# Patient Record
Sex: FEMALE | Race: WHITE | URBAN - METROPOLITAN AREA
[De-identification: names, ages, dates, MRNs, and addresses within clinical notes are randomized per-mention and may not be internally consistent; named-entity substitution may affect disease eponyms.]

---

## 2017-06-12 ENCOUNTER — EMERGENCY (EMERGENCY)
Facility: HOSPITAL | Age: 22
LOS: 0 days | Discharge: HOME | End: 2017-06-13
Attending: EMERGENCY MEDICINE | Admitting: EMERGENCY MEDICINE
Payer: COMMERCIAL

## 2017-06-12 VITALS
RESPIRATION RATE: 19 BRPM | DIASTOLIC BLOOD PRESSURE: 80 MMHG | WEIGHT: 126.1 LBS | HEIGHT: 64 IN | OXYGEN SATURATION: 100 % | HEART RATE: 98 BPM | SYSTOLIC BLOOD PRESSURE: 124 MMHG | TEMPERATURE: 98 F

## 2017-06-12 DIAGNOSIS — R10.9 UNSPECIFIED ABDOMINAL PAIN: ICD-10-CM

## 2017-06-12 DIAGNOSIS — K58.9 IRRITABLE BOWEL SYNDROME WITHOUT DIARRHEA: ICD-10-CM

## 2017-06-12 PROCEDURE — 99285 EMERGENCY DEPT VISIT HI MDM: CPT

## 2017-06-13 VITALS
RESPIRATION RATE: 18 BRPM | TEMPERATURE: 98 F | SYSTOLIC BLOOD PRESSURE: 112 MMHG | HEART RATE: 77 BPM | OXYGEN SATURATION: 100 % | DIASTOLIC BLOOD PRESSURE: 72 MMHG

## 2017-06-13 LAB
ABO RH CONFIRMATION: SIGNIFICANT CHANGE UP
ALBUMIN SERPL ELPH-MCNC: 3.8 G/DL — SIGNIFICANT CHANGE UP (ref 3.3–5)
ALP SERPL-CCNC: 57 U/L — SIGNIFICANT CHANGE UP (ref 40–120)
ALT FLD-CCNC: 20 U/L — SIGNIFICANT CHANGE UP (ref 12–78)
ANION GAP SERPL CALC-SCNC: 6 MMOL/L — SIGNIFICANT CHANGE UP (ref 5–17)
APPEARANCE UR: CLEAR — SIGNIFICANT CHANGE UP
APTT BLD: 27.9 SEC — SIGNIFICANT CHANGE UP (ref 27.5–37.4)
AST SERPL-CCNC: 17 U/L — SIGNIFICANT CHANGE UP (ref 15–37)
BACTERIA # UR AUTO: (no result)
BASOPHILS # BLD AUTO: 0 K/UL — SIGNIFICANT CHANGE UP (ref 0–0.2)
BASOPHILS NFR BLD AUTO: 0.5 % — SIGNIFICANT CHANGE UP (ref 0–2)
BILIRUB SERPL-MCNC: 0.4 MG/DL — SIGNIFICANT CHANGE UP (ref 0.2–1.2)
BILIRUB UR-MCNC: NEGATIVE — SIGNIFICANT CHANGE UP
BLD GP AB SCN SERPL QL: SIGNIFICANT CHANGE UP
BUN SERPL-MCNC: 14 MG/DL — SIGNIFICANT CHANGE UP (ref 7–23)
CALCIUM SERPL-MCNC: 9.1 MG/DL — SIGNIFICANT CHANGE UP (ref 8.5–10.1)
CHLORIDE SERPL-SCNC: 108 MMOL/L — SIGNIFICANT CHANGE UP (ref 96–108)
CO2 SERPL-SCNC: 28 MMOL/L — SIGNIFICANT CHANGE UP (ref 22–31)
COLOR SPEC: YELLOW — SIGNIFICANT CHANGE UP
CREAT SERPL-MCNC: 0.73 MG/DL — SIGNIFICANT CHANGE UP (ref 0.5–1.3)
DIFF PNL FLD: NEGATIVE — SIGNIFICANT CHANGE UP
EOSINOPHIL # BLD AUTO: 0.5 K/UL — SIGNIFICANT CHANGE UP (ref 0–0.5)
EOSINOPHIL NFR BLD AUTO: 6.5 % — HIGH (ref 0–6)
EPI CELLS # UR: (no result)
GLUCOSE SERPL-MCNC: 93 MG/DL — SIGNIFICANT CHANGE UP (ref 70–99)
GLUCOSE UR QL: NEGATIVE MG/DL — SIGNIFICANT CHANGE UP
HCG SERPL-ACNC: <1 MIU/ML — SIGNIFICANT CHANGE UP
HCG UR QL: NEGATIVE — SIGNIFICANT CHANGE UP
HCT VFR BLD CALC: 38 % — SIGNIFICANT CHANGE UP (ref 34.5–45)
HGB BLD-MCNC: 13 G/DL — SIGNIFICANT CHANGE UP (ref 11.5–15.5)
INR BLD: 0.95 RATIO — SIGNIFICANT CHANGE UP (ref 0.88–1.16)
KETONES UR-MCNC: (no result)
LEUKOCYTE ESTERASE UR-ACNC: (no result)
LYMPHOCYTES # BLD AUTO: 2.7 K/UL — SIGNIFICANT CHANGE UP (ref 1–3.3)
LYMPHOCYTES # BLD AUTO: 35 % — SIGNIFICANT CHANGE UP (ref 13–44)
MCHC RBC-ENTMCNC: 30.2 PG — SIGNIFICANT CHANGE UP (ref 27–34)
MCHC RBC-ENTMCNC: 34.3 GM/DL — SIGNIFICANT CHANGE UP (ref 32–36)
MCV RBC AUTO: 88.2 FL — SIGNIFICANT CHANGE UP (ref 80–100)
MONOCYTES # BLD AUTO: 0.6 K/UL — SIGNIFICANT CHANGE UP (ref 0–0.9)
MONOCYTES NFR BLD AUTO: 7.9 % — SIGNIFICANT CHANGE UP (ref 2–14)
NEUTROPHILS # BLD AUTO: 3.9 K/UL — SIGNIFICANT CHANGE UP (ref 1.8–7.4)
NEUTROPHILS NFR BLD AUTO: 50.1 % — SIGNIFICANT CHANGE UP (ref 43–77)
NITRITE UR-MCNC: NEGATIVE — SIGNIFICANT CHANGE UP
PH UR: 6 — SIGNIFICANT CHANGE UP (ref 5–8)
PLATELET # BLD AUTO: 257 K/UL — SIGNIFICANT CHANGE UP (ref 150–400)
POTASSIUM SERPL-MCNC: 4 MMOL/L — SIGNIFICANT CHANGE UP (ref 3.5–5.3)
POTASSIUM SERPL-SCNC: 4 MMOL/L — SIGNIFICANT CHANGE UP (ref 3.5–5.3)
PROT SERPL-MCNC: 7.8 GM/DL — SIGNIFICANT CHANGE UP (ref 6–8.3)
PROT UR-MCNC: 30 MG/DL
PROTHROM AB SERPL-ACNC: 10.2 SEC — SIGNIFICANT CHANGE UP (ref 9.8–12.7)
RBC # BLD: 4.3 M/UL — SIGNIFICANT CHANGE UP (ref 3.8–5.2)
RBC # FLD: 11 % — SIGNIFICANT CHANGE UP (ref 10.3–14.5)
RBC CASTS # UR COMP ASSIST: (no result) /HPF (ref 0–4)
SODIUM SERPL-SCNC: 142 MMOL/L — SIGNIFICANT CHANGE UP (ref 135–145)
SP GR SPEC: 1.02 — SIGNIFICANT CHANGE UP (ref 1.01–1.02)
TYPE + AB SCN PNL BLD: SIGNIFICANT CHANGE UP
UROBILINOGEN FLD QL: NEGATIVE MG/DL — SIGNIFICANT CHANGE UP
WBC # BLD: 7.7 K/UL — SIGNIFICANT CHANGE UP (ref 3.8–10.5)
WBC # FLD AUTO: 7.7 K/UL — SIGNIFICANT CHANGE UP (ref 3.8–10.5)
WBC UR QL: (no result)

## 2017-06-13 PROCEDURE — 74177 CT ABD & PELVIS W/CONTRAST: CPT | Mod: 26

## 2017-06-13 RX ORDER — FAMOTIDINE 10 MG/ML
20 INJECTION INTRAVENOUS ONCE
Qty: 0 | Refills: 0 | Status: COMPLETED | OUTPATIENT
Start: 2017-06-13 | End: 2017-06-13

## 2017-06-13 RX ORDER — NITROFURANTOIN MACROCRYSTAL 50 MG
100 CAPSULE ORAL ONCE
Qty: 0 | Refills: 0 | Status: COMPLETED | OUTPATIENT
Start: 2017-06-13 | End: 2017-06-13

## 2017-06-13 RX ORDER — MORPHINE SULFATE 50 MG/1
4 CAPSULE, EXTENDED RELEASE ORAL ONCE
Qty: 0 | Refills: 0 | Status: DISCONTINUED | OUTPATIENT
Start: 2017-06-13 | End: 2017-06-13

## 2017-06-13 RX ORDER — NITROFURANTOIN MACROCRYSTAL 50 MG
1 CAPSULE ORAL
Qty: 14 | Refills: 0
Start: 2017-06-13 | End: 2017-06-20

## 2017-06-13 RX ORDER — SODIUM CHLORIDE 9 MG/ML
1000 INJECTION INTRAMUSCULAR; INTRAVENOUS; SUBCUTANEOUS ONCE
Qty: 0 | Refills: 0 | Status: COMPLETED | OUTPATIENT
Start: 2017-06-13 | End: 2017-06-13

## 2017-06-13 RX ORDER — ONDANSETRON 8 MG/1
4 TABLET, FILM COATED ORAL ONCE
Qty: 0 | Refills: 0 | Status: COMPLETED | OUTPATIENT
Start: 2017-06-13 | End: 2017-06-13

## 2017-06-13 RX ORDER — SIMETHICONE 80 MG/1
160 TABLET, CHEWABLE ORAL ONCE
Qty: 0 | Refills: 0 | Status: COMPLETED | OUTPATIENT
Start: 2017-06-13 | End: 2017-06-13

## 2017-06-13 RX ORDER — SODIUM CHLORIDE 9 MG/ML
3 INJECTION INTRAMUSCULAR; INTRAVENOUS; SUBCUTANEOUS ONCE
Qty: 0 | Refills: 0 | Status: COMPLETED | OUTPATIENT
Start: 2017-06-13 | End: 2017-06-13

## 2017-06-13 RX ADMIN — MORPHINE SULFATE 4 MILLIGRAM(S): 50 CAPSULE, EXTENDED RELEASE ORAL at 02:58

## 2017-06-13 RX ADMIN — MORPHINE SULFATE 4 MILLIGRAM(S): 50 CAPSULE, EXTENDED RELEASE ORAL at 01:25

## 2017-06-13 RX ADMIN — SODIUM CHLORIDE 3 MILLILITER(S): 9 INJECTION INTRAMUSCULAR; INTRAVENOUS; SUBCUTANEOUS at 00:32

## 2017-06-13 RX ADMIN — Medication 100 MILLIGRAM(S): at 08:02

## 2017-06-13 RX ADMIN — SIMETHICONE 160 MILLIGRAM(S): 80 TABLET, CHEWABLE ORAL at 08:02

## 2017-06-13 RX ADMIN — ONDANSETRON 4 MILLIGRAM(S): 8 TABLET, FILM COATED ORAL at 01:10

## 2017-06-13 RX ADMIN — SODIUM CHLORIDE 1000 MILLILITER(S): 9 INJECTION INTRAMUSCULAR; INTRAVENOUS; SUBCUTANEOUS at 00:31

## 2017-06-13 RX ADMIN — FAMOTIDINE 20 MILLIGRAM(S): 10 INJECTION INTRAVENOUS at 01:12

## 2017-06-13 RX ADMIN — Medication 20 MILLIGRAM(S): at 08:02

## 2017-06-13 NOTE — ED PROVIDER NOTE - OBJECTIVE STATEMENT
23 y/o female with PMhx of IBS presents to the ED c/o 3 days of worsening RLQ and suprapubic pain. Currently pt has no other complaints at this time and denies chest pain, SOB, n/v/d and fever.

## 2017-06-13 NOTE — ED PROVIDER NOTE - NS ED MD SCRIBE ATTENDING SCRIBE SECTIONS
RESULTS/HISTORY OF PRESENT ILLNESS/PAST MEDICAL/SURGICAL/SOCIAL HISTORY/PROGRESS NOTE/REVIEW OF SYSTEMS/DISPOSITION/PHYSICAL EXAM

## 2017-06-13 NOTE — ED ADULT NURSE REASSESSMENT NOTE - NS ED NURSE REASSESS COMMENT FT1
Pt A&O x4, received pt from ROLANDO Stringer resting comfortably in bed with rails up, pt states pain relief with medication. ambulatory to restroom with assist. Pending CT. safety maintained, will monitor.

## 2017-06-13 NOTE — ED PROVIDER NOTE - MEDICAL DECISION MAKING DETAILS
Pt with suprapubic and RLQ abdominal pain plan blood work, pain control and antiemetic. Will re-asses.

## 2018-11-03 ENCOUNTER — TRANSCRIPTION ENCOUNTER (OUTPATIENT)
Age: 23
End: 2018-11-03

## 2020-05-13 ENCOUNTER — EMERGENCY (EMERGENCY)
Facility: HOSPITAL | Age: 25
LOS: 0 days | Discharge: HOME | End: 2020-05-13
Attending: STUDENT IN AN ORGANIZED HEALTH CARE EDUCATION/TRAINING PROGRAM
Payer: COMMERCIAL

## 2020-05-13 VITALS
DIASTOLIC BLOOD PRESSURE: 118 MMHG | HEART RATE: 133 BPM | RESPIRATION RATE: 18 BRPM | OXYGEN SATURATION: 99 % | TEMPERATURE: 99 F | WEIGHT: 119.93 LBS | SYSTOLIC BLOOD PRESSURE: 171 MMHG

## 2020-05-13 VITALS
HEART RATE: 95 BPM | TEMPERATURE: 98 F | SYSTOLIC BLOOD PRESSURE: 124 MMHG | RESPIRATION RATE: 16 BRPM | OXYGEN SATURATION: 100 % | DIASTOLIC BLOOD PRESSURE: 84 MMHG

## 2020-05-13 DIAGNOSIS — F41.9 ANXIETY DISORDER, UNSPECIFIED: ICD-10-CM

## 2020-05-13 DIAGNOSIS — R56.9 UNSPECIFIED CONVULSIONS: ICD-10-CM

## 2020-05-13 DIAGNOSIS — F32.9 MAJOR DEPRESSIVE DISORDER, SINGLE EPISODE, UNSPECIFIED: ICD-10-CM

## 2020-05-13 DIAGNOSIS — R00.0 TACHYCARDIA, UNSPECIFIED: ICD-10-CM

## 2020-05-13 DIAGNOSIS — K58.9 IRRITABLE BOWEL SYNDROME WITHOUT DIARRHEA: ICD-10-CM

## 2020-05-13 LAB
ALBUMIN SERPL ELPH-MCNC: 3.8 G/DL — SIGNIFICANT CHANGE UP (ref 3.3–5)
ALP SERPL-CCNC: 70 U/L — SIGNIFICANT CHANGE UP (ref 40–120)
ALT FLD-CCNC: 24 U/L — SIGNIFICANT CHANGE UP (ref 12–78)
ANION GAP SERPL CALC-SCNC: 9 MMOL/L — SIGNIFICANT CHANGE UP (ref 5–17)
APAP SERPL-MCNC: < 2 UG/ML (ref 10–30)
APPEARANCE UR: CLEAR — SIGNIFICANT CHANGE UP
AST SERPL-CCNC: 12 U/L — LOW (ref 15–37)
BASOPHILS # BLD AUTO: 0.03 K/UL — SIGNIFICANT CHANGE UP (ref 0–0.2)
BASOPHILS NFR BLD AUTO: 0.4 % — SIGNIFICANT CHANGE UP (ref 0–2)
BILIRUB SERPL-MCNC: 0.6 MG/DL — SIGNIFICANT CHANGE UP (ref 0.2–1.2)
BILIRUB UR-MCNC: NEGATIVE — SIGNIFICANT CHANGE UP
BUN SERPL-MCNC: 12 MG/DL — SIGNIFICANT CHANGE UP (ref 7–23)
CALCIUM SERPL-MCNC: 9 MG/DL — SIGNIFICANT CHANGE UP (ref 8.5–10.1)
CHLORIDE SERPL-SCNC: 105 MMOL/L — SIGNIFICANT CHANGE UP (ref 96–108)
CO2 SERPL-SCNC: 26 MMOL/L — SIGNIFICANT CHANGE UP (ref 22–31)
COLOR SPEC: YELLOW — SIGNIFICANT CHANGE UP
CREAT SERPL-MCNC: 0.83 MG/DL — SIGNIFICANT CHANGE UP (ref 0.5–1.3)
DIFF PNL FLD: NEGATIVE — SIGNIFICANT CHANGE UP
EOSINOPHIL # BLD AUTO: 0.13 K/UL — SIGNIFICANT CHANGE UP (ref 0–0.5)
EOSINOPHIL NFR BLD AUTO: 1.6 % — SIGNIFICANT CHANGE UP (ref 0–6)
ETHANOL SERPL-MCNC: <10 MG/DL — SIGNIFICANT CHANGE UP (ref 0–10)
GLUCOSE SERPL-MCNC: 90 MG/DL — SIGNIFICANT CHANGE UP (ref 70–99)
GLUCOSE UR QL: NEGATIVE MG/DL — SIGNIFICANT CHANGE UP
HCT VFR BLD CALC: 39.7 % — SIGNIFICANT CHANGE UP (ref 34.5–45)
HGB BLD-MCNC: 13.2 G/DL — SIGNIFICANT CHANGE UP (ref 11.5–15.5)
IMM GRANULOCYTES NFR BLD AUTO: 0.2 % — SIGNIFICANT CHANGE UP (ref 0–1.5)
KETONES UR-MCNC: ABNORMAL
LEUKOCYTE ESTERASE UR-ACNC: ABNORMAL
LYMPHOCYTES # BLD AUTO: 3 K/UL — SIGNIFICANT CHANGE UP (ref 1–3.3)
LYMPHOCYTES # BLD AUTO: 37.3 % — SIGNIFICANT CHANGE UP (ref 13–44)
MCHC RBC-ENTMCNC: 30.1 PG — SIGNIFICANT CHANGE UP (ref 27–34)
MCHC RBC-ENTMCNC: 33.2 GM/DL — SIGNIFICANT CHANGE UP (ref 32–36)
MCV RBC AUTO: 90.6 FL — SIGNIFICANT CHANGE UP (ref 80–100)
MONOCYTES # BLD AUTO: 0.53 K/UL — SIGNIFICANT CHANGE UP (ref 0–0.9)
MONOCYTES NFR BLD AUTO: 6.6 % — SIGNIFICANT CHANGE UP (ref 2–14)
NEUTROPHILS # BLD AUTO: 4.33 K/UL — SIGNIFICANT CHANGE UP (ref 1.8–7.4)
NEUTROPHILS NFR BLD AUTO: 53.9 % — SIGNIFICANT CHANGE UP (ref 43–77)
NITRITE UR-MCNC: NEGATIVE — SIGNIFICANT CHANGE UP
PH UR: 5 — SIGNIFICANT CHANGE UP (ref 5–8)
PLATELET # BLD AUTO: 295 K/UL — SIGNIFICANT CHANGE UP (ref 150–400)
POTASSIUM SERPL-MCNC: 3.6 MMOL/L — SIGNIFICANT CHANGE UP (ref 3.5–5.3)
POTASSIUM SERPL-SCNC: 3.6 MMOL/L — SIGNIFICANT CHANGE UP (ref 3.5–5.3)
PROT SERPL-MCNC: 8.1 GM/DL — SIGNIFICANT CHANGE UP (ref 6–8.3)
PROT UR-MCNC: 30 MG/DL
RBC # BLD: 4.38 M/UL — SIGNIFICANT CHANGE UP (ref 3.8–5.2)
RBC # FLD: 12 % — SIGNIFICANT CHANGE UP (ref 10.3–14.5)
SALICYLATES SERPL-MCNC: <1.7 MG/DL — LOW (ref 2.8–20)
SODIUM SERPL-SCNC: 140 MMOL/L — SIGNIFICANT CHANGE UP (ref 135–145)
SP GR SPEC: 1.02 — SIGNIFICANT CHANGE UP (ref 1.01–1.02)
UROBILINOGEN FLD QL: NEGATIVE MG/DL — SIGNIFICANT CHANGE UP
WBC # BLD: 8.04 K/UL — SIGNIFICANT CHANGE UP (ref 3.8–10.5)
WBC # FLD AUTO: 8.04 K/UL — SIGNIFICANT CHANGE UP (ref 3.8–10.5)

## 2020-05-13 PROCEDURE — 80053 COMPREHEN METABOLIC PANEL: CPT

## 2020-05-13 PROCEDURE — 99284 EMERGENCY DEPT VISIT MOD MDM: CPT | Mod: 25

## 2020-05-13 PROCEDURE — 84702 CHORIONIC GONADOTROPIN TEST: CPT

## 2020-05-13 PROCEDURE — 93005 ELECTROCARDIOGRAM TRACING: CPT

## 2020-05-13 PROCEDURE — 81025 URINE PREGNANCY TEST: CPT

## 2020-05-13 PROCEDURE — 82962 GLUCOSE BLOOD TEST: CPT

## 2020-05-13 PROCEDURE — 84443 ASSAY THYROID STIM HORMONE: CPT

## 2020-05-13 PROCEDURE — 93010 ELECTROCARDIOGRAM REPORT: CPT

## 2020-05-13 PROCEDURE — 85025 COMPLETE CBC W/AUTO DIFF WBC: CPT

## 2020-05-13 PROCEDURE — 80307 DRUG TEST PRSMV CHEM ANLYZR: CPT

## 2020-05-13 PROCEDURE — 81001 URINALYSIS AUTO W/SCOPE: CPT

## 2020-05-13 PROCEDURE — 70450 CT HEAD/BRAIN W/O DYE: CPT

## 2020-05-13 PROCEDURE — 87086 URINE CULTURE/COLONY COUNT: CPT

## 2020-05-13 PROCEDURE — 96360 HYDRATION IV INFUSION INIT: CPT

## 2020-05-13 PROCEDURE — 70450 CT HEAD/BRAIN W/O DYE: CPT | Mod: 26

## 2020-05-13 PROCEDURE — 36415 COLL VENOUS BLD VENIPUNCTURE: CPT

## 2020-05-13 PROCEDURE — 99284 EMERGENCY DEPT VISIT MOD MDM: CPT

## 2020-05-13 RX ORDER — SODIUM CHLORIDE 9 MG/ML
1000 INJECTION INTRAMUSCULAR; INTRAVENOUS; SUBCUTANEOUS ONCE
Refills: 0 | Status: COMPLETED | OUTPATIENT
Start: 2020-05-13 | End: 2020-05-13

## 2020-05-13 RX ADMIN — SODIUM CHLORIDE 1000 MILLILITER(S): 9 INJECTION INTRAMUSCULAR; INTRAVENOUS; SUBCUTANEOUS at 10:30

## 2020-05-13 RX ADMIN — SODIUM CHLORIDE 1000 MILLILITER(S): 9 INJECTION INTRAMUSCULAR; INTRAVENOUS; SUBCUTANEOUS at 09:20

## 2020-05-13 NOTE — ED PROVIDER NOTE - OBJECTIVE STATEMENT
26 y/o female with a PMHx of anxiety, depression, IBS presents to the ED BIBEMS from home for evaluation. Pt's  boyfriend woke up this morning and noticed pt shaking. Upon EMS arrival, pt was confused, not shaking and not really talking. Upon ED arrival, pt talking and was able to recall event. Pt reports 5 to 10 years ago she had similar symptoms and was diagnosed with a TIA. Denies tongue biting, bladder/bowel incontinence, head trauma. On Zoloft, Lamictal and Gabapentin. No other complaints at this time.

## 2020-05-13 NOTE — ED PROVIDER NOTE - NSFOLLOWUPINSTRUCTIONS_ED_ALL_ED_FT
Seizure, Adult  When you have a seizure:    Parts of your body may move.  How aware or awake (conscious) you are may change.  You may shake (convulse).    Some people have symptoms right before a seizure happens. These symptoms may include:    Fear.  Worry (anxiety).  Feeling like you are going to throw up (nausea).  Feeling like the room is spinning (vertigo).  Feeling like you saw or heard something before (hola vu).  Odd tastes or smells.  Changes in vision, such as seeing flashing lights or spots.    ImageSeizures usually last from 30 seconds to 2 minutes. Usually, they are not harmful unless they last a long time.    Follow these instructions at home:  Medicines     Take over-the-counter and prescription medicines only as told by your doctor.  Avoid anything that may keep your medicine from working, such as alcohol.  Activity     Do not do any activities that would be dangerous if you had another seizure, like driving or swimming. Wait until your doctor approves.  If you live in the [place] ask your local DMV (department of [place]) when you can drive.  Rest.  Teaching others     Teach friends and family what to do when you have a seizure. They should:    Lay you on the ground.  Protect your head and body.  Loosen any tight clothing around your neck.  Turn you on your side.  Stay with you until you are better.  Not hold you down.  Not put anything in your mouth.  Know whether or not you need emergency care.    General instructions     Contact your doctor each time you have a seizure.  Avoid anything that gives you seizures.  Keep a seizure diary. Write down:    What you think caused each seizure.  What you remember about each seizure.    Keep all follow-up visits as told by your doctor. This is important.  Contact a doctor if:  You have another seizure.  You have seizures more often.  There is any change in what happens during your seizures.  You continue to have seizures with treatment.  You have symptoms of being sick or having an infection.  Get help right away if:  You have a seizure:    That lasts longer than 5 minutes.  That is different than seizures you had before.  That makes it harder to breathe.  After you hurt your head.    After a seizure, you cannot speak or use a part of your body.  After a seizure, you are confused or have a bad headache.  You have two or more seizures in a row.  You are having seizures more often.  You do not wake up right after a seizure.  You get hurt during a seizure.  In an emergency:     These symptoms may be an emergency. Do not wait to see if the symptoms will go away. Get medical help right away. Call your local emergency services (911 in the [place] Do not drive yourself to the hospital.   This information is not intended to replace advice given to you by your health care provider. Make sure you discuss any questions you have with your health care provider.

## 2020-05-13 NOTE — ED PROVIDER NOTE - CARE PROVIDER_API CALL
Claudia Barnes  NEUROLOGY  [address] [place] [address], [address] [place] [address]  Phone: [phone]  Fax: [phone]  Follow Up Time:

## 2020-05-13 NOTE — ED PROVIDER NOTE - PROGRESS NOTE DETAILS
Charisma Day for attending Dr. Parra: Repeat HR 92, /93. Pt feeling better. Instructed to follow up with outpatient neurology in 1-2 days. Charisma Day for attending Dr. Parra: Repeat HR 92, /93. Pt feeling better. Instructed to follow up with outpatient neurology in 1-2 days. No driving until seen/cleared by neurology

## 2020-05-13 NOTE — ED ADULT TRIAGE NOTE - ACCOMPANIED BY
CLINICAL DATA:



Check for accessory placental lobe



TECHNICAL DATA:





Ultrasound, pelvic (nonobstetric), real-time with image documentation; transabdominal and transvagina
l imaging with Doppler was performed.



Doppler imaging demonstrates normal arterial and venous flow.



FINDINGS:



Within the uterus there is a broad area of heterogeneous echogenicity measuring 9.5 cm in length 3.2 
cm in AP dimensions and 4.2 cm in width. This fails to demonstrate significant vascularity.



There is no significant quantity of free fluid dependently within the pelvis.



IMPRESSION:



Abnormal appearance of the uterus with a ill-defined soft tissue area within the endometrial canal, d
ifferential diagnosis would include retained products of conception. In most cases of retained breast
 conception there is increased vascularity but the fact that there is not increased vascularity in th
is case does not completely exclude the diagnosis. Recommend clinical correlation. Repeat ultrasound 
may be of benefit for further evaluation



WOMEN OF REPRODUCTIVE AGE:



1. Cysts 3 cm: Normal physiologic findings; at the discretion of the interpreting physician whether o
r not to describe them in the imaging report; do not need follow-up.



2. Cysts >3 and 5 cm: Should be described in the imaging report with a statement that they are almost
 certainly benign; do not need follow-up.



3. Cysts >5 and 7 cm: Should be described in the imaging report with a statement that they are almost
 certainly benign; yearly follow-up with US recommended.



4. Cysts >7 cm: Since these may be difficult to assess completely with US, further imaging with magne
tic resonance (MR) or surgical evaluation should be considered.





 POSTMENOPAUSAL WOMEN:



1. Cysts 1 cm: Are clinically inconsequential; at the discretion of the interpreting physician whethe
r or not to describe them in the imaging report; do not need follow-up.



2. Cysts >1 and 7 cm: Should be described in the imaging report with statement that they are almost c
ertainly benign; yearly follow-up, at least initially, with US recommended. Some practices may opt to
 increase the lower size threshold for follow-up from 1 cm to as high as 3 cm. One may opt to continu
e follow-up annually or to decrease the frequency of follow-up once stability or decrease in size has
 been confirmed. Cysts in the larger end of this range should still generally be followed on a regula
r basis.



3. Cysts >7 cm: Since these may be difficult to assess completely with US, further imaging with MR or
 surgical evaluation should be considered..



Signer Name: Jamaal Garcia MD 

Signed: 8/17/2019 9:00 PM

 Workstation Name: [id number]
CLINICAL DATA:



Repeat examination for possible retained products of conception



TECHNICAL DATA:





Ultrasound, pelvic (nonobstetric), real-time with image documentation; transabdominal and transvagina
l imaging with Doppler was performed.



Doppler imaging demonstrates normal arterial and venous flow.



FINDINGS:



Within the uterus there is a broad area of heterogeneous echogenicity not dissimilar from the exam 24
 hours earlier. However there does appear to be increased vascularity.



There is no significant quantity of free fluid dependently within the pelvis.



IMPRESSION:



Abnormal appearance of the uterus with a ill-defined soft tissue area within the endometrial canal, d
ifferential diagnosis would include retained products of conception. Endometrial blood clot could giv
e a similar appearance. Recommend clinical correlation



WOMEN OF REPRODUCTIVE AGE:



1. Cysts 3 cm: Normal physiologic findings; at the discretion of the interpreting physician whether o
r not to describe them in the imaging report; do not need follow-up.



2. Cysts >3 and 5 cm: Should be described in the imaging report with a statement that they are almost
 certainly benign; do not need follow-up.



3. Cysts >5 and 7 cm: Should be described in the imaging report with a statement that they are almost
 certainly benign; yearly follow-up with US recommended.



4. Cysts >7 cm: Since these may be difficult to assess completely with US, further imaging with magne
tic resonance (MR) or surgical evaluation should be considered.





 POSTMENOPAUSAL WOMEN:



1. Cysts 1 cm: Are clinically inconsequential; at the discretion of the interpreting physician whethe
r or not to describe them in the imaging report; do not need follow-up.



2. Cysts >1 and 7 cm: Should be described in the imaging report with statement that they are almost c
ertainly benign; yearly follow-up, at least initially, with US recommended. Some practices may opt to
 increase the lower size threshold for follow-up from 1 cm to as high as 3 cm. One may opt to continu
e follow-up annually or to decrease the frequency of follow-up once stability or decrease in size has
 been confirmed. Cysts in the larger end of this range should still generally be followed on a regula
r basis.



3. Cysts >7 cm: Since these may be difficult to assess completely with US, further imaging with MR or
 surgical evaluation should be considered



Signer Name: Jamaal Garcia MD 

Signed: 8/18/2019 6:49 PM

 Workstation Name: [id number]
EMT/paramedic

## 2020-05-13 NOTE — ED PROVIDER NOTE - PATIENT PORTAL LINK FT
You can access the FollowMyHealth Patient Portal offered by [facility] by registering at the following website: [email or web address]. By joining [employer]’s FollowMyHealth portal, you will also be able to view your health information using other applications (apps) compatible with our system.

## 2020-05-13 NOTE — ED ADULT TRIAGE NOTE - CHIEF COMPLAINT QUOTE
Pt. to the ED BIBA from Home. As per EMS, boyfriend called ambulance because patient woke up shaking, pale and possible having a seizure at 8am while laying in bed- Pt. awake and alert at this time. Pt. appears anxious- Hx. of TIA and Bipolar- Denies SI/HI- Last seen normal 2am - LAMS 0- Code Stroke not indicated as per Dr. Parra

## 2020-05-14 PROBLEM — F41.9 ANXIETY DISORDER, UNSPECIFIED: Chronic | Status: ACTIVE | Noted: 2020-05-13

## 2020-05-14 PROBLEM — F32.9 MAJOR DEPRESSIVE DISORDER, SINGLE EPISODE, UNSPECIFIED: Chronic | Status: ACTIVE | Noted: 2020-05-13

## 2020-05-14 LAB
CULTURE RESULTS: SIGNIFICANT CHANGE UP
SPECIMEN SOURCE: SIGNIFICANT CHANGE UP

## 2020-05-19 ENCOUNTER — APPOINTMENT (OUTPATIENT)
Dept: NEUROLOGY | Facility: CLINIC | Age: 25
End: 2020-05-19
Payer: COMMERCIAL

## 2020-05-19 ENCOUNTER — TRANSCRIPTION ENCOUNTER (OUTPATIENT)
Age: 25
End: 2020-05-19

## 2020-05-19 DIAGNOSIS — R55 SYNCOPE AND COLLAPSE: ICD-10-CM

## 2020-05-19 DIAGNOSIS — F32.9 ANXIETY DISORDER, UNSPECIFIED: ICD-10-CM

## 2020-05-19 DIAGNOSIS — F41.9 ANXIETY DISORDER, UNSPECIFIED: ICD-10-CM

## 2020-05-19 DIAGNOSIS — Z87.19 PERSONAL HISTORY OF OTHER DISEASES OF THE DIGESTIVE SYSTEM: ICD-10-CM

## 2020-05-19 DIAGNOSIS — Z78.9 OTHER SPECIFIED HEALTH STATUS: ICD-10-CM

## 2020-05-19 PROCEDURE — 99205 OFFICE O/P NEW HI 60 MIN: CPT | Mod: 95

## 2020-05-19 RX ORDER — NORETHINDRONE ACETATE AND ETHINYL ESTRADIOL AND FERROUS FUMARATE 1MG-20(24)
1-20 KIT ORAL
Qty: 84 | Refills: 0 | Status: ACTIVE | COMMUNITY
Start: 2020-04-24

## 2020-05-19 RX ORDER — SERTRALINE HYDROCHLORIDE 50 MG/1
50 TABLET, FILM COATED ORAL
Qty: 30 | Refills: 0 | Status: ACTIVE | COMMUNITY
Start: 2020-05-08

## 2020-05-19 RX ORDER — LAMOTRIGINE 200 MG/1
200 TABLET ORAL
Qty: 30 | Refills: 0 | Status: ACTIVE | COMMUNITY
Start: 2019-12-27

## 2020-05-19 RX ORDER — CLONAZEPAM 0.5 MG/1
0.5 TABLET ORAL
Qty: 30 | Refills: 0 | Status: ACTIVE | COMMUNITY
Start: 2020-05-12

## 2020-05-19 RX ORDER — GABAPENTIN 300 MG/1
300 CAPSULE ORAL
Qty: 30 | Refills: 0 | Status: ACTIVE | COMMUNITY
Start: 2019-12-04

## 2020-05-19 NOTE — DISCUSSION/SUMMARY
[FreeTextEntry1] : 25 year old RH female with PMH of Anxiety/depression, possible syncope / ? seizure (pain-induced) \par 7 years ago, experienced a possible complex partial seizure on 5/13/20, witnessed by marcus, he reports, pts right leg was involuntary moving/-like kicking, she seemed to be dazed like having a bad dream, not responding appropriately, soon after, had flinging movements of right arm, this entire episode lasted about 2 minutes, her mental sensorium cleared fully in ED, no urinary incontinence.\par \par # New onset possible complex partial seizure; history of possible seizure versus syncope 7 years ago\par \par - I have recommended increasing lamotrigine by 25 mg per week x 2 weeks, to maximum of 250 mg daily.\par - Obtain MRI of the brain\par - 24 hr EEG monitoring, after that further recommendations\par - I have advised the patient not to drive, per [place] law, not to drive for 1 year, unless cleared by a neurologist\par - Seizure precautions

## 2020-05-19 NOTE — HISTORY OF PRESENT ILLNESS
[Home] : at home, [unfilled] , at the time of the visit. [Medical Office: ([facility])___] : at the medical office located in  [Patient] : the patient [FreeTextEntry1] : Verbal consent obtained for Telehealth visit obtained, during the visit patient's fiancé is by her side.\par \par 25 year old RH female with PMH of Anxiety/depression, reports that on 5/13/20 she had a possible seizure, she was seen in [facility] ED, she does not recall the details as she was in and out of consciousness, she recalls being fully aware/awake in ED.\par As per patient's fiancé, he reports, at around 8 PM on that morning, as he rolled over in bed, he noted patient right leg was involuntary moving/-like kicking, he tried to wake her up, she seemed to be dazed like having a bad dream, she was not responding appropriately, soon after, he noted she had flinging movements of right arm, this entire episode lasted about 2 minutes, she continued to be disoriented, EMS was called, upon arrival in ED, patient's mental sensorium cleared, she did not have any urinary incontinence, but reports no headache and heaviness. In ED, CT scan of the head that was unremarkable, routine labs, EKG were done as well and she was discharged home with advice to follow up with neurology.\par \par Upon obtaining further history, patient recalls about 7 years ago she had severe abdominal/stomach pains, she apparently collapsed at home and was in and out of consciousness, she does not recall much detail to this event, she followed up with a neurologist Dr. Spangler.\par \par Patient has been following up with a psychiatrist for years, she is on lamotrigine 200 mgs, gabapentin 300 mg daily and clonazepam; 3 weeks ago Zoloft 50 mg was added as patient was under lot of stress, her stepfather had committed suicide.\par \par Patient's fiancé reports that patient continues to have occasional twitching movements of right arm and leg, has been present since past 3 weeks. In addition, he reports that she has been sleep talking and on one occasion she pretended to be watching TV, when she was not aware of it.

## 2020-05-19 NOTE — REASON FOR VISIT
[Post ER] : a post ER visit [Other: _____] : [unfilled] [FreeTextEntry1] : Referred by ED physician for evaluation of possible seizure

## 2020-05-19 NOTE — REVIEW OF SYSTEMS
[As Noted in HPI] : as noted in HPI [Seizures] : convulsions [Anxiety] : anxiety [Depression] : depression [Sleep Disturbances] : sleep disturbances [Negative] : Heme/Lymph [de-identified] : jittery / twitching sensation

## 2020-05-19 NOTE — PHYSICAL EXAM
[General Appearance - Alert] : alert [General Appearance - Well-Appearing] : healthy appearing [General Appearance - In No Acute Distress] : in no acute distress [Impaired Insight] : insight and judgment were intact [Mood] : the mood was normal [Person] : oriented to person [Place] : oriented to place [Registration Intact] : recent registration memory intact [Time] : oriented to time [Concentration Intact] : normal concentrating ability [Span Intact] : the attention span was normal [Naming Objects] : no difficulty naming common objects [Repeating Phrases] : no difficulty repeating a phrase [Comprehension] : comprehension intact [Fluency] : fluency intact [Current Events] : adequate knowledge of current events [Cranial Nerves Optic (II)] : visual acuity intact bilaterally,  visual fields full to confrontation, pupils equal round and reactive to light [Cranial Nerves Oculomotor (III)] : extraocular motion intact [Cranial Nerves Vestibulocochlear (VIII)] : hearing was intact bilaterally [Cranial Nerves Facial (VII)] : face symmetrical [Cranial Nerves Accessory (XI - Cranial And Spinal)] : head turning and shoulder shrug symmetric [Cranial Nerves Hypoglossal (XII)] : there was no tongue deviation with protrusion [FreeTextEntry6] : Virtual limited neuro exam grossly nonfocal, no finger nose finger ataxia.

## 2020-05-27 ENCOUNTER — APPOINTMENT (OUTPATIENT)
Dept: NEUROLOGY | Facility: CLINIC | Age: 25
End: 2020-05-27
Payer: COMMERCIAL

## 2020-05-27 PROCEDURE — 95816 EEG AWAKE AND DROWSY: CPT

## 2020-05-28 ENCOUNTER — APPOINTMENT (OUTPATIENT)
Dept: NEUROLOGY | Facility: CLINIC | Age: 25
End: 2020-05-28
Payer: COMMERCIAL

## 2020-05-28 PROCEDURE — 95700 EEG CONT REC W/VID EEG TECH: CPT

## 2020-05-28 PROCEDURE — 95708 EEG WO VID EA 12-26HR UNMNTR: CPT

## 2020-05-28 PROCEDURE — 95719 EEG PHYS/QHP EA INCR W/O VID: CPT

## 2020-06-02 ENCOUNTER — APPOINTMENT (OUTPATIENT)
Dept: MRI IMAGING | Facility: CLINIC | Age: 25
End: 2020-06-02
Payer: COMMERCIAL

## 2020-06-02 ENCOUNTER — OUTPATIENT (OUTPATIENT)
Dept: OUTPATIENT SERVICES | Facility: HOSPITAL | Age: 25
LOS: 1 days | End: 2020-06-02
Payer: COMMERCIAL

## 2020-06-02 DIAGNOSIS — Z00.8 ENCOUNTER FOR OTHER GENERAL EXAMINATION: ICD-10-CM

## 2020-06-02 DIAGNOSIS — R56.9 UNSPECIFIED CONVULSIONS: ICD-10-CM

## 2020-06-02 PROCEDURE — 70551 MRI BRAIN STEM W/O DYE: CPT

## 2020-06-02 PROCEDURE — 70551 MRI BRAIN STEM W/O DYE: CPT | Mod: 26

## 2020-06-16 ENCOUNTER — RX RENEWAL (OUTPATIENT)
Age: 25
End: 2020-06-16

## 2020-06-16 RX ORDER — LAMOTRIGINE 25 MG/1
25 TABLET ORAL
Qty: 60 | Refills: 0 | Status: ACTIVE | COMMUNITY
Start: 2020-05-19 | End: 1900-01-01

## 2020-06-29 ENCOUNTER — APPOINTMENT (OUTPATIENT)
Dept: NEUROLOGY | Facility: CLINIC | Age: 25
End: 2020-06-29
Payer: COMMERCIAL

## 2020-06-29 PROCEDURE — 99214 OFFICE O/P EST MOD 30 MIN: CPT | Mod: 95

## 2020-06-29 NOTE — REASON FOR VISIT
[FreeTextEntry1] : for seizure / discuss EEG / MRI results [Follow-Up: _____] : a [unfilled] follow-up visit

## 2020-06-29 NOTE — HISTORY OF PRESENT ILLNESS
[Medical Office: ([facility])___] : at the medical office located in  [Home] : at home, [unfilled] , at the time of the visit. [Verbal consent obtained from patient] : the patient, [unfilled] [FreeTextEntry1] : Pt is for Telehealth followup visit, initial consult on 5/19/20. She reports she has not had any more episodes of LOC, change in mental status, jazzy, syncope or shaking episodes; she does admit that occasionally she has slight jerky / twitching movements in the mornings, she also reports that her Fiance her that she moves/talks her sleep.\par \par Patient was advised to increase lamotrigine by 25 mg weekly, in addition to her prior dose of lamotrigine 200 mg daily, currently she is on total 250 mg daily.\par \par MRI of the brain revealed no mass lesion no MTS.\par 24-hour ambulatory EEG monitoring revealed no seizures/events or epileptiform activity

## 2020-06-29 NOTE — DISCUSSION/SUMMARY
[FreeTextEntry1] : 25 year old RH female with PMH of Anxiety/depression, possible syncope / ? seizure (pain-induced) \par 7 years ago, experienced a possible complex partial seizure on 5/13/20, witnessed by marcus, he reports, pts right leg was involuntary moving/-like kicking, she seemed to be dazed like having a bad dream, not responding appropriately, soon after, had flinging movements of right arm, this entire episode lasted about 2 minutes, her mental sensorium cleared fully in ED, no urinary incontinence.\par \par # New onset possible complex partial seizure vs myoclonic seizures; history of possible seizure versus syncope 7 years ago, 24 hr EEG monitoring and MRI brain no significant abnormality.\par \par - I have recommended continuing lamotrigine 250 mg daily for now.\par - Consult with Dr. Rodriguez, epileptologist  in 2-3 weeks, Re: ongoing treatment in consideration of childbearing age\par - I have advised the patient not to drive, per [place] law, not to drive for 1 year, unless cleared by a neurologist\par

## 2020-06-29 NOTE — REVIEW OF SYSTEMS
[As Noted in HPI] : as noted in HPI [Anxiety] : anxiety [Sleep Disturbances] : sleep disturbances [Depression] : depression [Negative] : Heme/Lymph [de-identified] : jittery / twitching sensation

## 2020-06-29 NOTE — DATA REVIEWED
[de-identified] : 66/3/20: No MR evidence of mesial temporal sclerosis, no acute intracranial hemorrhage or infarct or mass [de-identified] : 5/27/20 - 5/28/20: 24-hour ambulatory EEG monitoring revealed no seizures/events or epileptiform activity.

## 2020-06-29 NOTE — PHYSICAL EXAM
[General Appearance - In No Acute Distress] : in no acute distress [General Appearance - Well-Appearing] : healthy appearing [General Appearance - Alert] : alert [Impaired Insight] : insight and judgment were intact [Mood] : the mood was normal [Person] : oriented to person [Time] : oriented to time [Place] : oriented to place [Registration Intact] : recent registration memory intact [Concentration Intact] : normal concentrating ability [Naming Objects] : no difficulty naming common objects [Span Intact] : the attention span was normal [Fluency] : fluency intact [Comprehension] : comprehension intact [Repeating Phrases] : no difficulty repeating a phrase [Cranial Nerves Optic (II)] : visual acuity intact bilaterally,  visual fields full to confrontation, pupils equal round and reactive to light [Cranial Nerves Oculomotor (III)] : extraocular motion intact [Cranial Nerves Accessory (XI - Cranial And Spinal)] : head turning and shoulder shrug symmetric [Cranial Nerves Vestibulocochlear (VIII)] : hearing was intact bilaterally [Cranial Nerves Facial (VII)] : face symmetrical [Cranial Nerves Hypoglossal (XII)] : there was no tongue deviation with protrusion [FreeTextEntry6] : Virtual limited neuro exam grossly nonfocal

## 2020-07-07 NOTE — ED ADULT TRIAGE NOTE - PAIN RATING/NUMBER SCALE (0-10): REST
Patient needs an appointment. If possible we can push this up to after hours tomorrow. Otherwise can fill it on the eighth.   Historically when I have filled prescriptions early, increased incidence of no-shows for appointments
Received fax from pharmacy requesting refill on pts medication(s). Pt was last seen in office on 12/31/2019  and has a follow up scheduled for 7/8/2020. Will send request to  Dr. Charlet Koyanagi  for authorization. Requested Prescriptions     Pending Prescriptions Disp Refills    methylphenidate (CONCERTA) 36 MG extended release tablet 60 tablet 0     Sig: Take 2 tablets by mouth every morning for 30 days.
This is Luda's son. She will not no show you, or alteast shouldn't. If so, you can speak to her personally.
0

## 2020-07-21 ENCOUNTER — APPOINTMENT (OUTPATIENT)
Dept: NEUROLOGY | Facility: CLINIC | Age: 25
End: 2020-07-21
Payer: COMMERCIAL

## 2020-07-21 VITALS
HEART RATE: 85 BPM | BODY MASS INDEX: 21 KG/M2 | SYSTOLIC BLOOD PRESSURE: 120 MMHG | TEMPERATURE: 97.8 F | DIASTOLIC BLOOD PRESSURE: 70 MMHG | HEIGHT: 64 IN | WEIGHT: 123 LBS

## 2020-07-21 DIAGNOSIS — M54.2 CERVICALGIA: ICD-10-CM

## 2020-07-21 DIAGNOSIS — R56.9 UNSPECIFIED CONVULSIONS: ICD-10-CM

## 2020-07-21 PROCEDURE — 99215 OFFICE O/P EST HI 40 MIN: CPT

## 2020-07-21 NOTE — CONSULT LETTER
[Consult Letter:] : I had the pleasure of evaluating your patient, [unfilled]. [Dear  ___] : Dear  [unfilled], [FreeTextEntry2] : Stephanie Leonard [Consult Closing:] : Thank you very much for allowing me to participate in the care of this patient.  If you have any questions, please do not hesitate to contact me. [Please see my note below.] : Please see my note below. [FreeTextEntry3] : Sincerely,\par \par \par Rolanda Rodriguez MD\par Diplomate, American Academy of Psychiatry and Neurology\par Board Certified in the Subspecialty of Clinical Neurophysiology\par Board Certified in the Subspecialty of Sleep Medicine\par Board Certified in the Subspecialty of Epilepsy\par

## 2020-07-21 NOTE — DATA REVIEWED
[de-identified] : MRi brain 6/3/20: No evidence of MTS, no acute intracranial hemorrhage or infarct or mass. [de-identified] : 24 hour EEG 5/27/20-5/28/20: normal

## 2020-07-21 NOTE — DISCUSSION/SUMMARY
[FreeTextEntry1] : Ms. Hawkins is a 25 year old woman who had an event in May suspicious for a focal seizure.\par She has since had a normal MRI brain and normal 24 hour EEG.\par \par Her examination is significant for brisk reflexes, particularly on the right.\par \par Seizure\par -It is unclear if she had a true focal onset seizure or if there was another etiology such as non-epileptic seizures (she is under a lot of stress).\par -Agree with increasing Lamotrigine for now.\par -I  will check the Lamictal level and if low will increase the dose. Now that she is also taking this for seizure we will need to divide doses or switch to Lamictal XR. I advised her to take 50 mg in the AM and 200 mg at night for now.\par -Advised to avoid driving at this time.\par -Will add folate 1 mg/day although she is not planning on conceiving in the near future. \par \par Neck pain/possible Myelopathy\par -Will get MRI cervical spine for chronic neck pain and increased right sided reflexes w/ positive Singh's bilaterally\par \par follow-up in about one month, sooner if needed.
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
## 2020-08-02 ENCOUNTER — EMERGENCY (EMERGENCY)
Facility: HOSPITAL | Age: 25
LOS: 0 days | Discharge: HOME | End: 2020-08-02
Attending: EMERGENCY MEDICINE
Payer: COMMERCIAL

## 2020-08-02 VITALS
DIASTOLIC BLOOD PRESSURE: 70 MMHG | OXYGEN SATURATION: 100 % | HEART RATE: 80 BPM | TEMPERATURE: 99 F | SYSTOLIC BLOOD PRESSURE: 104 MMHG | RESPIRATION RATE: 18 BRPM

## 2020-08-02 VITALS
TEMPERATURE: 99 F | HEART RATE: 114 BPM | RESPIRATION RATE: 18 BRPM | SYSTOLIC BLOOD PRESSURE: 137 MMHG | OXYGEN SATURATION: 100 % | DIASTOLIC BLOOD PRESSURE: 80 MMHG | WEIGHT: 110.01 LBS | HEIGHT: 66 IN

## 2020-08-02 DIAGNOSIS — F32.9 MAJOR DEPRESSIVE DISORDER, SINGLE EPISODE, UNSPECIFIED: ICD-10-CM

## 2020-08-02 DIAGNOSIS — G40.909 EPILEPSY, UNSPECIFIED, NOT INTRACTABLE, WITHOUT STATUS EPILEPTICUS: ICD-10-CM

## 2020-08-02 DIAGNOSIS — F41.9 ANXIETY DISORDER, UNSPECIFIED: ICD-10-CM

## 2020-08-02 DIAGNOSIS — K58.9 IRRITABLE BOWEL SYNDROME WITHOUT DIARRHEA: ICD-10-CM

## 2020-08-02 LAB
ALBUMIN SERPL ELPH-MCNC: 4 G/DL — SIGNIFICANT CHANGE UP (ref 3.3–5)
ALP SERPL-CCNC: 57 U/L — SIGNIFICANT CHANGE UP (ref 40–120)
ALT FLD-CCNC: 21 U/L — SIGNIFICANT CHANGE UP (ref 12–78)
ANION GAP SERPL CALC-SCNC: 6 MMOL/L — SIGNIFICANT CHANGE UP (ref 5–17)
APPEARANCE UR: CLEAR — SIGNIFICANT CHANGE UP
AST SERPL-CCNC: 8 U/L — LOW (ref 15–37)
BASOPHILS # BLD AUTO: 0.02 K/UL — SIGNIFICANT CHANGE UP (ref 0–0.2)
BASOPHILS NFR BLD AUTO: 0.3 % — SIGNIFICANT CHANGE UP (ref 0–2)
BILIRUB SERPL-MCNC: 0.6 MG/DL — SIGNIFICANT CHANGE UP (ref 0.2–1.2)
BILIRUB UR-MCNC: NEGATIVE — SIGNIFICANT CHANGE UP
BUN SERPL-MCNC: 14 MG/DL — SIGNIFICANT CHANGE UP (ref 7–23)
CALCIUM SERPL-MCNC: 9.4 MG/DL — SIGNIFICANT CHANGE UP (ref 8.5–10.1)
CHLORIDE SERPL-SCNC: 107 MMOL/L — SIGNIFICANT CHANGE UP (ref 96–108)
CO2 SERPL-SCNC: 26 MMOL/L — SIGNIFICANT CHANGE UP (ref 22–31)
COLOR SPEC: YELLOW — SIGNIFICANT CHANGE UP
CREAT SERPL-MCNC: 0.9 MG/DL — SIGNIFICANT CHANGE UP (ref 0.5–1.3)
DIFF PNL FLD: ABNORMAL
EOSINOPHIL # BLD AUTO: 0.35 K/UL — SIGNIFICANT CHANGE UP (ref 0–0.5)
EOSINOPHIL NFR BLD AUTO: 4.9 % — SIGNIFICANT CHANGE UP (ref 0–6)
GLUCOSE SERPL-MCNC: 99 MG/DL — SIGNIFICANT CHANGE UP (ref 70–99)
GLUCOSE UR QL: NEGATIVE MG/DL — SIGNIFICANT CHANGE UP
HCT VFR BLD CALC: 41.1 % — SIGNIFICANT CHANGE UP (ref 34.5–45)
HGB BLD-MCNC: 13.9 G/DL — SIGNIFICANT CHANGE UP (ref 11.5–15.5)
IMM GRANULOCYTES NFR BLD AUTO: 0.1 % — SIGNIFICANT CHANGE UP (ref 0–1.5)
KETONES UR-MCNC: ABNORMAL
LEUKOCYTE ESTERASE UR-ACNC: ABNORMAL
LYMPHOCYTES # BLD AUTO: 1.97 K/UL — SIGNIFICANT CHANGE UP (ref 1–3.3)
LYMPHOCYTES # BLD AUTO: 27.7 % — SIGNIFICANT CHANGE UP (ref 13–44)
MCHC RBC-ENTMCNC: 30.3 PG — SIGNIFICANT CHANGE UP (ref 27–34)
MCHC RBC-ENTMCNC: 33.8 GM/DL — SIGNIFICANT CHANGE UP (ref 32–36)
MCV RBC AUTO: 89.7 FL — SIGNIFICANT CHANGE UP (ref 80–100)
MONOCYTES # BLD AUTO: 0.47 K/UL — SIGNIFICANT CHANGE UP (ref 0–0.9)
MONOCYTES NFR BLD AUTO: 6.6 % — SIGNIFICANT CHANGE UP (ref 2–14)
NEUTROPHILS # BLD AUTO: 4.3 K/UL — SIGNIFICANT CHANGE UP (ref 1.8–7.4)
NEUTROPHILS NFR BLD AUTO: 60.4 % — SIGNIFICANT CHANGE UP (ref 43–77)
NITRITE UR-MCNC: NEGATIVE — SIGNIFICANT CHANGE UP
PH UR: 5 — SIGNIFICANT CHANGE UP (ref 5–8)
PLATELET # BLD AUTO: 278 K/UL — SIGNIFICANT CHANGE UP (ref 150–400)
POTASSIUM SERPL-MCNC: 4.1 MMOL/L — SIGNIFICANT CHANGE UP (ref 3.5–5.3)
POTASSIUM SERPL-SCNC: 4.1 MMOL/L — SIGNIFICANT CHANGE UP (ref 3.5–5.3)
PROT SERPL-MCNC: 8.3 GM/DL — SIGNIFICANT CHANGE UP (ref 6–8.3)
PROT UR-MCNC: 30 MG/DL
RBC # BLD: 4.58 M/UL — SIGNIFICANT CHANGE UP (ref 3.8–5.2)
RBC # FLD: 11.9 % — SIGNIFICANT CHANGE UP (ref 10.3–14.5)
SODIUM SERPL-SCNC: 139 MMOL/L — SIGNIFICANT CHANGE UP (ref 135–145)
SP GR SPEC: 1.02 — SIGNIFICANT CHANGE UP (ref 1.01–1.02)
UROBILINOGEN FLD QL: 1 MG/DL
WBC # BLD: 7.12 K/UL — SIGNIFICANT CHANGE UP (ref 3.8–10.5)
WBC # FLD AUTO: 7.12 K/UL — SIGNIFICANT CHANGE UP (ref 3.8–10.5)

## 2020-08-02 PROCEDURE — 85025 COMPLETE CBC W/AUTO DIFF WBC: CPT

## 2020-08-02 PROCEDURE — 80175 DRUG SCREEN QUAN LAMOTRIGINE: CPT

## 2020-08-02 PROCEDURE — 80053 COMPREHEN METABOLIC PANEL: CPT

## 2020-08-02 PROCEDURE — 81001 URINALYSIS AUTO W/SCOPE: CPT

## 2020-08-02 PROCEDURE — 36415 COLL VENOUS BLD VENIPUNCTURE: CPT

## 2020-08-02 PROCEDURE — 99283 EMERGENCY DEPT VISIT LOW MDM: CPT

## 2020-08-02 RX ORDER — LAMOTRIGINE 25 MG/1
100 TABLET, ORALLY DISINTEGRATING ORAL ONCE
Refills: 0 | Status: COMPLETED | OUTPATIENT
Start: 2020-08-02 | End: 2020-08-02

## 2020-08-02 RX ORDER — LAMOTRIGINE 25 MG/1
3 TABLET, ORALLY DISINTEGRATING ORAL
Qty: 90 | Refills: 0
Start: 2020-08-02 | End: 2020-08-31

## 2020-08-02 RX ORDER — LAMOTRIGINE 25 MG/1
50 TABLET, ORALLY DISINTEGRATING ORAL ONCE
Refills: 0 | Status: DISCONTINUED | OUTPATIENT
Start: 2020-08-02 | End: 2020-08-02

## 2020-08-02 RX ORDER — LAMOTRIGINE 25 MG/1
1 TABLET, ORALLY DISINTEGRATING ORAL
Qty: 60 | Refills: 0
Start: 2020-08-02 | End: 2020-08-31

## 2020-08-02 RX ADMIN — LAMOTRIGINE 100 MILLIGRAM(S): 25 TABLET, ORALLY DISINTEGRATING ORAL at 13:46

## 2020-08-02 NOTE — ED ADULT TRIAGE NOTE - CHIEF COMPLAINT QUOTE
pt bibems for witnessed seizure at home, hx of seizures, pt is not post-ictal, pt is alert, oriented. pt bibems for witnessed seizure at home, hx of seizures, pt is not post-ictal, pt is alert, oriented, currently taking lamictal.

## 2020-08-02 NOTE — ED PROVIDER NOTE - CLINICAL SUMMARY MEDICAL DECISION MAKING FREE TEXT BOX
Pt with hx of seizure, with seizure this morning. Will check basic labs, ua, preg, touch base with neurology. Anticipate discharge.

## 2020-08-02 NOTE — ED ADULT NURSE NOTE - NSIMPLEMENTINTERV_GEN_ALL_ED
Implemented All Universal Safety Interventions:  [place] to call system. Call bell, personal items and telephone within reach. Instruct patient to call for assistance. Room bathroom lighting operational. Non-slip footwear when patient is off stretcher. Physically safe environment: no spills, clutter or unnecessary equipment. Stretcher in lowest position, wheels locked, appropriate side rails in place.

## 2020-08-02 NOTE — ED PROVIDER NOTE - PROGRESS NOTE DETAILS
Charisma WADE for ED attending Dr. Stewart: Dr. Amos wants to increase 100mg of Lamictal in morning and 200mg at night and f/u with Dr. Tyson.

## 2020-08-02 NOTE — ED PROVIDER NOTE - OBJECTIVE STATEMENT
24 y/o F presents to ED s/p witnessed seizure. Pt states her last seizure was in May, follows up with neurology since seizure. Pt states she was told by her fiance that she was unconscious for first 2 minutes and had shaking of the extremities succeeding that. Reports headache currently. States she had increased jerking of extremities over the past few weeks. On Lamictal 250 mg. Reports recent stress. Denies head trauma. Neurologist: Dr. Tyson and Dr. Rodriguez.

## 2020-08-02 NOTE — ED ADULT NURSE NOTE - CHIEF COMPLAINT QUOTE
pt bibems for witnessed seizure at home, hx of seizures, pt is not post-ictal, pt is alert, oriented, currently taking lamictal.

## 2020-08-02 NOTE — ED PROVIDER NOTE - NEUROLOGICAL, MLM
Alert and oriented, no focal deficits, no motor or sensory deficits. A&Ox3, PERRLA, EOMI, no nystagmus, CN2-12 grossly intact, no pronator drift, normal finger to nose and rapid alternating finger movements, normal sensation and 5/5 strength in all extremities, normal gait.

## 2020-08-02 NOTE — ED PROVIDER NOTE - PATIENT PORTAL LINK FT
You can access the FollowMyHealth Patient Portal offered by [facility] by registering at the following website: [email or web address]. By joining [facility]’s FollowMyHealth portal, you will also be able to view your health information using other applications (apps) compatible with our system.

## 2020-08-05 ENCOUNTER — TRANSCRIPTION ENCOUNTER (OUTPATIENT)
Age: 25
End: 2020-08-05

## 2020-08-05 LAB
LAMOTRIGINE SERPL-MCNC: 3.1 MCG/ML — LOW (ref 4–18)
LAMOTRIGINE SERPL-MCNC: 3.9 MCG/ML

## 2020-08-06 NOTE — ED POST DISCHARGE NOTE - RESULT SUMMARY
patient had 3 more seizures the day after being here, went to [facility].  Spoke with Dr. Rodriguez today, medications adjusted, has follow up, return precautions reviewed.  aware that her lamictal levels were low during her visit here -Brisa Mays PA-C

## 2020-08-17 ENCOUNTER — APPOINTMENT (OUTPATIENT)
Dept: NEUROLOGY | Facility: CLINIC | Age: 25
End: 2020-08-17
Payer: COMMERCIAL

## 2020-08-17 VITALS
HEART RATE: 84 BPM | BODY MASS INDEX: 21 KG/M2 | SYSTOLIC BLOOD PRESSURE: 100 MMHG | DIASTOLIC BLOOD PRESSURE: 62 MMHG | TEMPERATURE: 97.9 F | HEIGHT: 64 IN | WEIGHT: 123 LBS

## 2020-08-17 DIAGNOSIS — R56.9 UNSPECIFIED CONVULSIONS: ICD-10-CM

## 2020-08-17 PROCEDURE — 99213 OFFICE O/P EST LOW 20 MIN: CPT

## 2020-08-17 NOTE — PHYSICAL EXAM
[FreeTextEntry1] : Examination:\par Constitutional: normal, no apparent distress\par Eyes: normal conjunctiva b/l, no ptosis, visual fields full\par Respiratory: no respiratory distress, normal effort, normal auscultation\par Cardiovascular: normal rate, rhythm, no murmurs\par Neck: supple, no masses\par Vascular: carotids normal\par Skin: normal color, no rashes\par Psych: normal mood, affect\par \par Neurological:\par Memory: normal memory, oriented to person, place, time\par Language intact/no aphasia\par Cranial Nerves: II-XII intact, Pupils equally round and reactive to light, ocular muscles/movements intact, no ptosis, no facial weakness, tongue protrudes normally in the midline, \par Motor: normal tone, no pronator drift, full strength in all four extremities in the proximal and distal muscle groups\par Coordination: Fine motor movements intact, rapid alternating movements intact, finger to nose intact bilaterally\par Sensory: intact to light touch\par DTRs: symmetric, 2+ in b/l biceps, brachioradialis, 2+ left patellar, 3+ on right\par Gait: narrow based, steady\par
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
## 2020-08-17 NOTE — DATA REVIEWED
[de-identified] : MRi brain 6/3/20: No evidence of MTS, no acute intracranial hemorrhage or infarct or mass. [de-identified] : EEG 5/27/20: normal\par 24 hour EEG 5/27/20-5/28/20: normal\par  [de-identified] : Lamictal level 8/5/20: 3.9 (at dose of 200 mg/day)

## 2020-08-17 NOTE — CONSULT LETTER
[Dear  ___] : Dear  [unfilled], [Consult Letter:] : I had the pleasure of evaluating your patient, [unfilled]. [Please see my note below.] : Please see my note below. [Consult Closing:] : Thank you very much for allowing me to participate in the care of this patient.  If you have any questions, please do not hesitate to contact me. [FreeTextEntry2] : Stephanie Leonard [FreeTextEntry3] : Sincerely,\par \par \par Rolanda Rodriguez MD\par Diplomate, American Academy of Psychiatry and Neurology\par Board Certified in the Subspecialty of Clinical Neurophysiology\par Board Certified in the Subspecialty of Sleep Medicine\par Board Certified in the Subspecialty of Epilepsy\par

## 2020-08-17 NOTE — DISCUSSION/SUMMARY
[FreeTextEntry1] : Ms. 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 Advised to utilize relaxation exercises during this period of time.\par -Continue to follow up with psychiatry and psychology\par \par Neck pain/possible Myelopathy\par -MRI cervical spine not yet performed\par \par \par follow-up in about one month, sooner if needed. \par

## 2020-09-01 ENCOUNTER — APPOINTMENT (OUTPATIENT)
Dept: MRI IMAGING | Facility: CLINIC | Age: 25
End: 2020-09-01

## 2020-09-11 ENCOUNTER — APPOINTMENT (OUTPATIENT)
Dept: DISASTER EMERGENCY | Facility: CLINIC | Age: 25
End: 2020-09-11

## 2020-09-11 DIAGNOSIS — Z01.818 ENCOUNTER FOR OTHER PREPROCEDURAL EXAMINATION: ICD-10-CM

## 2020-09-12 LAB — SARS-COV-2 N GENE NPH QL NAA+PROBE: NOT DETECTED

## 2020-09-14 ENCOUNTER — INPATIENT (INPATIENT)
Facility: HOSPITAL | Age: 25
LOS: 1 days | Discharge: HOME | DRG: 101 | End: 2020-09-16
Attending: STUDENT IN AN ORGANIZED HEALTH CARE EDUCATION/TRAINING PROGRAM | Admitting: PSYCHIATRY & NEUROLOGY
Payer: COMMERCIAL

## 2020-09-14 VITALS
SYSTOLIC BLOOD PRESSURE: 118 MMHG | DIASTOLIC BLOOD PRESSURE: 79 MMHG | HEART RATE: 89 BPM | RESPIRATION RATE: 12 BRPM | TEMPERATURE: 98 F

## 2020-09-14 DIAGNOSIS — F44.5 CONVERSION DISORDER WITH SEIZURES OR CONVULSIONS: ICD-10-CM

## 2020-09-14 LAB
ALBUMIN SERPL ELPH-MCNC: 3.4 G/DL — SIGNIFICANT CHANGE UP (ref 3.3–5)
ALP SERPL-CCNC: 52 U/L — SIGNIFICANT CHANGE UP (ref 40–120)
ALT FLD-CCNC: 15 U/L — SIGNIFICANT CHANGE UP (ref 12–78)
ANION GAP SERPL CALC-SCNC: 2 MMOL/L — LOW (ref 5–17)
AST SERPL-CCNC: 12 U/L — LOW (ref 15–37)
BILIRUB SERPL-MCNC: 0.7 MG/DL — SIGNIFICANT CHANGE UP (ref 0.2–1.2)
BUN SERPL-MCNC: 10 MG/DL — SIGNIFICANT CHANGE UP (ref 7–23)
CALCIUM SERPL-MCNC: 8.9 MG/DL — SIGNIFICANT CHANGE UP (ref 8.5–10.1)
CHLORIDE SERPL-SCNC: 109 MMOL/L — HIGH (ref 96–108)
CO2 SERPL-SCNC: 29 MMOL/L — SIGNIFICANT CHANGE UP (ref 22–31)
CREAT SERPL-MCNC: 0.71 MG/DL — SIGNIFICANT CHANGE UP (ref 0.5–1.3)
GLUCOSE SERPL-MCNC: 84 MG/DL — SIGNIFICANT CHANGE UP (ref 70–99)
HCT VFR BLD CALC: 35.5 % — SIGNIFICANT CHANGE UP (ref 34.5–45)
HGB BLD-MCNC: 11.6 G/DL — SIGNIFICANT CHANGE UP (ref 11.5–15.5)
MCHC RBC-ENTMCNC: 30.4 PG — SIGNIFICANT CHANGE UP (ref 27–34)
MCHC RBC-ENTMCNC: 32.7 GM/DL — SIGNIFICANT CHANGE UP (ref 32–36)
MCV RBC AUTO: 92.9 FL — SIGNIFICANT CHANGE UP (ref 80–100)
PLATELET # BLD AUTO: 241 K/UL — SIGNIFICANT CHANGE UP (ref 150–400)
POTASSIUM SERPL-MCNC: 4 MMOL/L — SIGNIFICANT CHANGE UP (ref 3.5–5.3)
POTASSIUM SERPL-SCNC: 4 MMOL/L — SIGNIFICANT CHANGE UP (ref 3.5–5.3)
PROT SERPL-MCNC: 7.4 GM/DL — SIGNIFICANT CHANGE UP (ref 6–8.3)
RBC # BLD: 3.82 M/UL — SIGNIFICANT CHANGE UP (ref 3.8–5.2)
RBC # FLD: 12 % — SIGNIFICANT CHANGE UP (ref 10.3–14.5)
SODIUM SERPL-SCNC: 140 MMOL/L — SIGNIFICANT CHANGE UP (ref 135–145)
WBC # BLD: 7.31 K/UL — SIGNIFICANT CHANGE UP (ref 3.8–10.5)
WBC # FLD AUTO: 7.31 K/UL — SIGNIFICANT CHANGE UP (ref 3.8–10.5)

## 2020-09-14 PROCEDURE — 95700 EEG CONT REC W/VID EEG TECH: CPT

## 2020-09-14 PROCEDURE — 82607 VITAMIN B-12: CPT

## 2020-09-14 PROCEDURE — 95816 EEG AWAKE AND DROWSY: CPT

## 2020-09-14 PROCEDURE — 86769 SARS-COV-2 COVID-19 ANTIBODY: CPT

## 2020-09-14 PROCEDURE — 81001 URINALYSIS AUTO W/SCOPE: CPT

## 2020-09-14 PROCEDURE — 99223 1ST HOSP IP/OBS HIGH 75: CPT

## 2020-09-14 PROCEDURE — 36415 COLL VENOUS BLD VENIPUNCTURE: CPT

## 2020-09-14 PROCEDURE — 95714 VEEG EA 12-26 HR UNMNTR: CPT

## 2020-09-14 PROCEDURE — 85027 COMPLETE CBC AUTOMATED: CPT

## 2020-09-14 PROCEDURE — 80048 BASIC METABOLIC PNL TOTAL CA: CPT

## 2020-09-14 PROCEDURE — 82728 ASSAY OF FERRITIN: CPT

## 2020-09-14 PROCEDURE — 80053 COMPREHEN METABOLIC PANEL: CPT

## 2020-09-14 PROCEDURE — 95816 EEG AWAKE AND DROWSY: CPT | Mod: 26

## 2020-09-14 PROCEDURE — 93005 ELECTROCARDIOGRAM TRACING: CPT

## 2020-09-14 PROCEDURE — 93010 ELECTROCARDIOGRAM REPORT: CPT

## 2020-09-14 RX ORDER — LAMOTRIGINE 25 MG/1
200 TABLET, ORALLY DISINTEGRATING ORAL AT BEDTIME
Refills: 0 | Status: DISCONTINUED | OUTPATIENT
Start: 2020-09-14 | End: 2020-09-16

## 2020-09-14 RX ORDER — GABAPENTIN 400 MG/1
300 CAPSULE ORAL AT BEDTIME
Refills: 0 | Status: DISCONTINUED | OUTPATIENT
Start: 2020-09-14 | End: 2020-09-16

## 2020-09-14 RX ORDER — SERTRALINE 25 MG/1
25 TABLET, FILM COATED ORAL AT BEDTIME
Refills: 0 | Status: DISCONTINUED | OUTPATIENT
Start: 2020-09-14 | End: 2020-09-16

## 2020-09-14 RX ORDER — ACETAMINOPHEN 500 MG
650 TABLET ORAL EVERY 6 HOURS
Refills: 0 | Status: DISCONTINUED | OUTPATIENT
Start: 2020-09-14 | End: 2020-09-16

## 2020-09-14 RX ADMIN — GABAPENTIN 300 MILLIGRAM(S): 400 CAPSULE ORAL at 22:02

## 2020-09-14 RX ADMIN — SERTRALINE 25 MILLIGRAM(S): 25 TABLET, FILM COATED ORAL at 22:03

## 2020-09-14 RX ADMIN — LAMOTRIGINE 200 MILLIGRAM(S): 25 TABLET, ORALLY DISINTEGRATING ORAL at 22:02

## 2020-09-14 NOTE — H&P ADULT - ASSESSMENT
26 y/o female with PMH of anxiety, depression, IBS, p/w for video EEG to evaluate for epilepsy vs non-epileptic seizures.    # Seizures   - Video EEG for 48-72 hours depending on findings  - Neurology consult - Dr. Delvalle - appreciated     # Anxiety/Depression   -Continue Lamictal 200 mg/day  -Continue Zoloft 25 mg/day  -Continue Gabapentin 300 mg/day    DVT prophylaxis: ambulation    Code status: full code    Dispo: 48-72 hrs video EEG

## 2020-09-14 NOTE — PATIENT PROFILE ADULT - NSPROPTRIGHTCAREGIVER_GEN_A_NUR
RN phoned patient.  Reviewed below  Patient in agreement  No other concerns  Encounter closed     declines

## 2020-09-14 NOTE — H&P ADULT - NSHPPHYSICALEXAM_GEN_ALL_CORE
PHYSICAL EXAM:  GENERAL: NAD, well-groomed, well-developed  HEAD:  Atraumatic, Normocephalic  EYES: EOMI, PERRLA, conjunctiva and sclera clear  ENMT: Moist mucous membranes, Good dentition  NECK: Supple, No JVD  NERVOUS SYSTEM:  A/O x3, Good concentration; CN 2-12 intact, No focal deficits  CHEST/LUNG: Clear to auscultation bilaterally; No rales, rhonchi, wheezing, or rubs  HEART: Regular rate and rhythm; S1/S2, No murmurs, rubs, or gallops  ABDOMEN: Soft, Nontender, Nondistended; Bowel sounds present  VASCULAR: Normal pulses, Normal capillary refill  EXTREMITIES:  2+ Peripheral Pulses, No clubbing, cyanosis, or edema  SKIN: Warm, Intact  PSYCH: Normal mood and affect
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
## 2020-09-14 NOTE — DIETITIAN INITIAL EVALUATION ADULT. - PERTINENT LABORATORY DATA
09-14 Na140 mmol/L Glu 84 mg/dL K+ 4.0 mmol/L Cr  0.71 mg/dL BUN 10 mg/dL Phos n/a   Alb 3.4 g/dL PAB n/a
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
## 2020-09-14 NOTE — DIETITIAN INITIAL EVALUATION ADULT. - MALNUTRITION
Meets criteria for moderate protein-calorie malnutrition in the context of social and environmental circumstance.

## 2020-09-14 NOTE — H&P ADULT - NSHPREVIEWOFSYSTEMS_GEN_ALL_CORE
REVIEW OF SYSTEMS:  CONSTITUTIONAL: No fever, weight loss, or fatigue  EYES: No eye pain, visual disturbances, or discharge  ENMT:  No difficulty hearing, tinnitus, vertigo; No sinus or throat pain  NECK: No neck pain or neck stiffness  BREASTS: No pain, masses, or nipple discharge  RESPIRATORY: No cough, wheezing, chills or hemoptysis; No shortness of breath  CARDIOVASCULAR: No chest pain, palpitations, dizziness, or leg swelling  GASTROINTESTINAL: No abdominal pain, No nausea, vomiting, or hematemesis; No diarrhea or constipation  GENITOURINARY: No dysuria, frequency, hematuria, or incontinence  NEUROLOGICAL: No headaches, memory loss, loss of strength, + numbness, + tremors  SKIN: No itching, burning, rashes, or lesions   LYMPH NODES: No enlarged glands  ENDOCRINE: No heat or cold intolerance; No hair loss  MUSCULOSKELETAL: No joint pain or swelling; No muscle, back, or extremity pain  PSYCHIATRIC: No depression, anxiety, mood swings, or difficulty sleeping  HEME/LYMPH: No easy bruising or bleeding  ALLERY AND IMMUNOLOGIC: No hives or eczema    All other ROS reviewed and negative except as otherwise stated
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
## 2020-09-14 NOTE — DIETITIAN INITIAL EVALUATION ADULT. - PERTINENT MEDS FT
MEDICATIONS  (STANDING):  gabapentin 300 milliGRAM(s) Oral at bedtime  lamoTRIgine 200 milliGRAM(s) Oral at bedtime  sertraline 25 milliGRAM(s) Oral at bedtime    MEDICATIONS  (PRN):  acetaminophen   Tablet .. 650 milliGRAM(s) Oral every 6 hours PRN Temp greater or equal to 38C (100.4F), Mild Pain (1 - 3)

## 2020-09-14 NOTE — PHARMACOTHERAPY INTERVENTION NOTE - COMMENTS
Confirmed home medications with patient and Dr. First Med History. Updated patient allergies and preferred outpatient pharmacy and documented in Outpatient Medication Review.
Addressed patient's medication-related questions.

## 2020-09-14 NOTE — CONSULT NOTE ADULT - ASSESSMENT
Ms. Hawkins is a 25 year old woman who is here for video EEG to evaluate for epilepsy vs non-epileptic seizures.  -Video EEG for 48-72 hours depending on findings  -Continue Lamictal 200 mg/day  -Continue Zoloft 25 mg/day  -Continue Gabapentin 300 mg/day    Will follow

## 2020-09-15 LAB
ALBUMIN SERPL ELPH-MCNC: 3.3 G/DL — SIGNIFICANT CHANGE UP (ref 3.3–5)
ALP SERPL-CCNC: 51 U/L — SIGNIFICANT CHANGE UP (ref 40–120)
ALT FLD-CCNC: 14 U/L — SIGNIFICANT CHANGE UP (ref 12–78)
ANION GAP SERPL CALC-SCNC: 4 MMOL/L — LOW (ref 5–17)
APPEARANCE UR: ABNORMAL
AST SERPL-CCNC: 10 U/L — LOW (ref 15–37)
BACTERIA # UR AUTO: ABNORMAL
BILIRUB SERPL-MCNC: 1 MG/DL — SIGNIFICANT CHANGE UP (ref 0.2–1.2)
BILIRUB UR-MCNC: NEGATIVE — SIGNIFICANT CHANGE UP
BUN SERPL-MCNC: 11 MG/DL — SIGNIFICANT CHANGE UP (ref 7–23)
CALCIUM SERPL-MCNC: 9 MG/DL — SIGNIFICANT CHANGE UP (ref 8.5–10.1)
CHLORIDE SERPL-SCNC: 107 MMOL/L — SIGNIFICANT CHANGE UP (ref 96–108)
CO2 SERPL-SCNC: 28 MMOL/L — SIGNIFICANT CHANGE UP (ref 22–31)
COLOR SPEC: YELLOW — SIGNIFICANT CHANGE UP
COMMENT - URINE: SIGNIFICANT CHANGE UP
CREAT SERPL-MCNC: 0.78 MG/DL — SIGNIFICANT CHANGE UP (ref 0.5–1.3)
DIFF PNL FLD: ABNORMAL
EPI CELLS # UR: ABNORMAL
GLUCOSE SERPL-MCNC: 82 MG/DL — SIGNIFICANT CHANGE UP (ref 70–99)
GLUCOSE UR QL: NEGATIVE MG/DL — SIGNIFICANT CHANGE UP
HCT VFR BLD CALC: 36.6 % — SIGNIFICANT CHANGE UP (ref 34.5–45)
HGB BLD-MCNC: 12.1 G/DL — SIGNIFICANT CHANGE UP (ref 11.5–15.5)
KETONES UR-MCNC: NEGATIVE — SIGNIFICANT CHANGE UP
LEUKOCYTE ESTERASE UR-ACNC: NEGATIVE — SIGNIFICANT CHANGE UP
MCHC RBC-ENTMCNC: 30.3 PG — SIGNIFICANT CHANGE UP (ref 27–34)
MCHC RBC-ENTMCNC: 33.1 GM/DL — SIGNIFICANT CHANGE UP (ref 32–36)
MCV RBC AUTO: 91.7 FL — SIGNIFICANT CHANGE UP (ref 80–100)
NITRITE UR-MCNC: NEGATIVE — SIGNIFICANT CHANGE UP
PH UR: 6 — SIGNIFICANT CHANGE UP (ref 5–8)
PLATELET # BLD AUTO: 248 K/UL — SIGNIFICANT CHANGE UP (ref 150–400)
POTASSIUM SERPL-MCNC: 4.1 MMOL/L — SIGNIFICANT CHANGE UP (ref 3.5–5.3)
POTASSIUM SERPL-SCNC: 4.1 MMOL/L — SIGNIFICANT CHANGE UP (ref 3.5–5.3)
PROT SERPL-MCNC: 7.3 GM/DL — SIGNIFICANT CHANGE UP (ref 6–8.3)
PROT UR-MCNC: 15 MG/DL
RBC # BLD: 3.99 M/UL — SIGNIFICANT CHANGE UP (ref 3.8–5.2)
RBC # FLD: 12.1 % — SIGNIFICANT CHANGE UP (ref 10.3–14.5)
RBC CASTS # UR COMP ASSIST: SIGNIFICANT CHANGE UP /HPF (ref 0–4)
SARS-COV-2 IGG SERPL QL IA: NEGATIVE — SIGNIFICANT CHANGE UP
SARS-COV-2 IGM SERPL IA-ACNC: 0.25 INDEX — SIGNIFICANT CHANGE UP
SODIUM SERPL-SCNC: 139 MMOL/L — SIGNIFICANT CHANGE UP (ref 135–145)
SP GR SPEC: 1.02 — SIGNIFICANT CHANGE UP (ref 1.01–1.02)
UROBILINOGEN FLD QL: NEGATIVE MG/DL — SIGNIFICANT CHANGE UP
WBC # BLD: 6.13 K/UL — SIGNIFICANT CHANGE UP (ref 3.8–10.5)
WBC # FLD AUTO: 6.13 K/UL — SIGNIFICANT CHANGE UP (ref 3.8–10.5)
WBC UR QL: NEGATIVE — SIGNIFICANT CHANGE UP

## 2020-09-15 PROCEDURE — 95720 EEG PHY/QHP EA INCR W/VEEG: CPT

## 2020-09-15 PROCEDURE — 99232 SBSQ HOSP IP/OBS MODERATE 35: CPT

## 2020-09-15 RX ADMIN — GABAPENTIN 300 MILLIGRAM(S): 400 CAPSULE ORAL at 21:27

## 2020-09-15 RX ADMIN — LAMOTRIGINE 200 MILLIGRAM(S): 25 TABLET, ORALLY DISINTEGRATING ORAL at 21:28

## 2020-09-15 RX ADMIN — SERTRALINE 25 MILLIGRAM(S): 25 TABLET, FILM COATED ORAL at 21:28

## 2020-09-15 NOTE — PROGRESS NOTE ADULT - SUBJECTIVE AND OBJECTIVE BOX
Interval History:  9/15/20: Reports having had difficulty sleeping last night.  Reported an episode of feeling shaky at about 12 AM.    MEDICATIONS  (STANDING):  gabapentin 300 milliGRAM(s) Oral at bedtime  lamoTRIgine 200 milliGRAM(s) Oral at bedtime  sertraline 25 milliGRAM(s) Oral at bedtime    MEDICATIONS  (PRN):  acetaminophen   Tablet .. 650 milliGRAM(s) Oral every 6 hours PRN Temp greater or equal to 38C (100.4F), Mild Pain (1 - 3)      Allergies    No Known Allergies    Intolerances        PHYSICAL EXAM:  Vital Signs Last 24 Hrs  T(F): 98.4 (09-15-20 @ 09:14)  HR: 75 (09-15-20 @ 09:14)  BP: 99/63 (09-15-20 @ 09:14)  RR: 15 (09-15-20 @ 09:14)    GENERAL: NAD, well-groomed, well-developed  HEAD:  Atraumatic, Normocephalic  Neuro:  Awake, alert, no aphasia  CN: PERRL, EOMI, no nystagmus, no facial weakness, tongue protrudes in the midline  motor: normal tone, no pronator drift, full strength in all four extremities  sensory: intact to light touch  coordination: finger to nose intact bilaterally      LABS:                        12.1   6.13  )-----------( 248      ( 15 Sep 2020 07:00 )             36.6     09-15    139  |  107  |  11  ----------------------------<  82  4.1   |  28  |  0.78    Ca    9.0      15 Sep 2020 07:00    TPro  7.3  /  Alb  3.3  /  TBili  1.0  /  DBili  x   /  AST  10<L>  /  ALT  14  /  AlkPhos  51  09-15          RADIOLOGY & ADDITIONAL STUDIES:  Routine EEG 9/14: normal

## 2020-09-15 NOTE — PROGRESS NOTE ADULT - ASSESSMENT
Ms. Hawkins is a 25 year old woman who is here for video EEG to evaluate for epilepsy vs non-epileptic seizures.  -Video EEG for 48-72 hours depending on findings  -So far Video EEG is normal  -Continue Lamictal 200 mg/day  -Continue Zoloft 25 mg/day (last dose scheduled for tomorrow as her psychiatrist is tapering)  -Continue Gabapentin 300 mg/day  -Will check B12 and Ferritin as patient's  has noticed frequent movements of legs in sleep.  Will follow

## 2020-09-15 NOTE — PROGRESS NOTE ADULT - ASSESSMENT
24 y/o female with PMH of anxiety, depression, IBS, p/w for video EEG to evaluate for epilepsy vs non-epileptic seizures.    # Seizures   - Video EEG for 48-72 hours depending on findings  - Neurology consult - Dr. Delvalle - appreciated     # Anxiety/Depression   -Continue Lamictal 200 mg/day  -Continue Zoloft 25 mg/day  -Continue Gabapentin 300 mg/day

## 2020-09-15 NOTE — PROGRESS NOTE ADULT - SUBJECTIVE AND OBJECTIVE BOX
chief complaint: seizures    Subjective and objective     24 y/o female with PMH of anxiety, depression, IBS, p/w seizures. Pt. first started having episodes suspicious for seizures in May 2020 when her  was woken up by kicking of her right leg.  She was admitted at [facility]. She was already on Lamotrigine for mood stabilization.  Lamotrigine dose was increased slightly.  She continued to have events suspicious for seizures and was eventually hospitalized at [facility] where an event was captured on video EEG and felt to be non-epileptic.  She has a history of significant psychological trauma. Previous EEGs have been unremarkable as has a non-contrast MRI brain.  The patient and her  want to confirm the diagnosis. She says that her last seizure was about four days ago. She had a day with three seizures in one day   after her dose of Zoloft was increased. The dose has since been decreased. Most of the events occur at night.    REVIEW OF SYSTEMS:  All other review of systems is negative unless indicated above    Vital Signs Last 24 Hrs  T(C): 36.9 (15 Sep 2020 09:14), Max: 36.9 (15 Sep 2020 05:21)  T(F): 98.4 (15 Sep 2020 09:14), Max: 98.5 (15 Sep 2020 05:21)  HR: 75 (15 Sep 2020 09:14) (71 - 83)  BP: 99/63 (15 Sep 2020 09:14) (99/63 - 110/71)  BP(mean): --  RR: 15 (15 Sep 2020 09:14) (15 - 16)  SpO2: 99% (15 Sep 2020 09:14) (99% - 99%)    PHYSICAL EXAM:    Constitutional: NAD, awake and alert, well-developed  HEENT: PERR, EOMI, Normal Hearing, MMM  Neck: Soft and supple, No LAD, No JVD  Respiratory: Breath sounds are clear bilaterally, No wheezing, rales or rhonchi  Cardiovascular: S1 and S2, regular rate and rhythm, no Murmurs, gallops or rubs  Gastrointestinal: Bowel Sounds present, soft, nontender, nondistended, no guarding, no rebound  Extremities: No peripheral edema  Vascular: 2+ peripheral pulses  Neurological: A/O x 3, no focal deficits  Musculoskeletal: 5/5 strength b/l upper and lower extremities  Skin: No rashes    Medications:  MEDICATIONS  (STANDING):  gabapentin 300 milliGRAM(s) Oral at bedtime  lamoTRIgine 200 milliGRAM(s) Oral at bedtime  sertraline 25 milliGRAM(s) Oral at bedtime      Labs: All Labs Reviewed:                        12.1   6.13  )-----------( 248      ( 15 Sep 2020 07:00 )             36.6     09-15    139  |  107  |  11  ----------------------------<  82  4.1   |  28  |  0.78    Ca    9.0      15 Sep 2020 07:00    TPro  7.3  /  Alb  3.3  /  TBili  1.0  /  DBili  x   /  AST  10<L>  /  ALT  14  /  AlkPhos  51  09-15    RADIOLOGY/EKG:    EXAM:  CT BRAIN                            PROCEDURE DATE:  05/13/2020          INTERPRETATION:  CLINICAL STATEMENT: Seizure    TECHNIQUE: CT of the head was performed without IV contrast.    COMPARISON: CT head 5/27/2013    FINDINGS:  There is no acute intracranial hemorrhage, parenchymal mass, mass effect or midline shift. There is no acute territorial infarct. There is no hydrocephalus.    The cranium is intact. The visualized paranasal sinuses are well-aerated.    IMPRESSION:  No acute intracranial hemorrhage or acute territorial infarct.  If symptoms persist, follow-up MRI exam recommended.    DVT PPX: ambulation     Advance Directive: full code     Disposition: cont. EEG monitoring

## 2020-09-16 ENCOUNTER — TRANSCRIPTION ENCOUNTER (OUTPATIENT)
Age: 25
End: 2020-09-16

## 2020-09-16 VITALS
TEMPERATURE: 98 F | RESPIRATION RATE: 16 BRPM | DIASTOLIC BLOOD PRESSURE: 59 MMHG | OXYGEN SATURATION: 100 % | SYSTOLIC BLOOD PRESSURE: 102 MMHG | HEART RATE: 69 BPM

## 2020-09-16 LAB
ANION GAP SERPL CALC-SCNC: 3 MMOL/L — LOW (ref 5–17)
BUN SERPL-MCNC: 9 MG/DL — SIGNIFICANT CHANGE UP (ref 7–23)
CALCIUM SERPL-MCNC: 9 MG/DL — SIGNIFICANT CHANGE UP (ref 8.5–10.1)
CHLORIDE SERPL-SCNC: 110 MMOL/L — HIGH (ref 96–108)
CO2 SERPL-SCNC: 27 MMOL/L — SIGNIFICANT CHANGE UP (ref 22–31)
CREAT SERPL-MCNC: 0.79 MG/DL — SIGNIFICANT CHANGE UP (ref 0.5–1.3)
FERRITIN SERPL-MCNC: 78 NG/ML — SIGNIFICANT CHANGE UP (ref 15–150)
GLUCOSE SERPL-MCNC: 90 MG/DL — SIGNIFICANT CHANGE UP (ref 70–99)
HCT VFR BLD CALC: 38.2 % — SIGNIFICANT CHANGE UP (ref 34.5–45)
HGB BLD-MCNC: 12.5 G/DL — SIGNIFICANT CHANGE UP (ref 11.5–15.5)
MCHC RBC-ENTMCNC: 30.1 PG — SIGNIFICANT CHANGE UP (ref 27–34)
MCHC RBC-ENTMCNC: 32.7 GM/DL — SIGNIFICANT CHANGE UP (ref 32–36)
MCV RBC AUTO: 92 FL — SIGNIFICANT CHANGE UP (ref 80–100)
PLATELET # BLD AUTO: 242 K/UL — SIGNIFICANT CHANGE UP (ref 150–400)
POTASSIUM SERPL-MCNC: 4.3 MMOL/L — SIGNIFICANT CHANGE UP (ref 3.5–5.3)
POTASSIUM SERPL-SCNC: 4.3 MMOL/L — SIGNIFICANT CHANGE UP (ref 3.5–5.3)
RBC # BLD: 4.15 M/UL — SIGNIFICANT CHANGE UP (ref 3.8–5.2)
RBC # FLD: 12.1 % — SIGNIFICANT CHANGE UP (ref 10.3–14.5)
SODIUM SERPL-SCNC: 140 MMOL/L — SIGNIFICANT CHANGE UP (ref 135–145)
VIT B12 SERPL-MCNC: 216 PG/ML — LOW (ref 232–1245)
WBC # BLD: 7.49 K/UL — SIGNIFICANT CHANGE UP (ref 3.8–10.5)
WBC # FLD AUTO: 7.49 K/UL — SIGNIFICANT CHANGE UP (ref 3.8–10.5)

## 2020-09-16 PROCEDURE — 95720 EEG PHY/QHP EA INCR W/VEEG: CPT

## 2020-09-16 PROCEDURE — 99232 SBSQ HOSP IP/OBS MODERATE 35: CPT

## 2020-09-16 PROCEDURE — 99239 HOSP IP/OBS DSCHRG MGMT >30: CPT

## 2020-09-16 NOTE — DISCHARGE NOTE PROVIDER - NSDCMRMEDTOKEN_GEN_ALL_CORE_FT
clonazePAM 0.5 mg oral tablet: 1 tab(s) orally once a day (at bedtime), As Needed  gabapentin 300 mg oral capsule: 1 cap(s) orally once a day  lamoTRIgine 200 mg oral tablet: 1 tab(s) orally once a day (at bedtime)  Melodetta 24 Fe oral tablet, chewable: 1 tab(s) orally once a day  Zoloft 25 mg oral tablet: 1 tab(s) orally once a day

## 2020-09-16 NOTE — PROGRESS NOTE ADULT - SUBJECTIVE AND OBJECTIVE BOX
Interval History:  20: Had an episode of shaking of right arm overnight.    MEDICATIONS  (STANDING):  gabapentin 300 milliGRAM(s) Oral at bedtime  lamoTRIgine 200 milliGRAM(s) Oral at bedtime  sertraline 25 milliGRAM(s) Oral at bedtime    MEDICATIONS  (PRN):  acetaminophen   Tablet .. 650 milliGRAM(s) Oral every 6 hours PRN Temp greater or equal to 38C (100.4F), Mild Pain (1 - 3)      Allergies    No Known Allergies    Intolerances        PHYSICAL EXAM:  Vital Signs Last 24 Hrs  T(F): 98.2 (20 @ 08:06)  HR: 69 (20 @ 08:06)  BP: 102/59 (20 @ 08:06)  RR: 16 (20 @ 08:06)    GENERAL: NAD, well-groomed, well-developed  HEAD:  Atraumatic, Normocephalic  Neuro:  Awake, alert, no aphasia  CN: PERRL, EOMI, no nystagmus, no facial weakness, tongue protrudes in the midline  motor: normal tone, no pronator drift, full strength in all four extremities  sensory: intact to light touch  coordination: finger to nose intact bilaterally  DTRs: symmetric, plantar responses flexor bilaterally    LABS:                        12.5   7.49  )-----------( 242      ( 16 Sep 2020 07:29 )             38.2     09-    140  |  110<H>  |  9   ----------------------------<  90  4.3   |  27  |  0.79    Ca    9.0      16 Sep 2020 07:29    TPro  7.3  /  Alb  3.3  /  TBili  1.0  /  DBili  x   /  AST  10<L>  /  ALT  14  /  AlkPhos  51  09-15      Urinalysis Basic - ( 15 Sep 2020 18:40 )    Color: Yellow / Appearance: Slightly Turbid / S.020 / pH: x  Gluc: x / Ketone: Negative  / Bili: Negative / Urobili: Negative mg/dL   Blood: x / Protein: 15 mg/dL / Nitrite: Negative   Leuk Esterase: Negative / RBC: 0-2 /HPF / WBC Negative   Sq Epi: x / Non Sq Epi: Many / Bacteria: Moderate        RADIOLOGY & ADDITIONAL STUDIES:  EEG: normal

## 2020-09-16 NOTE — DISCHARGE NOTE PROVIDER - NSDCCPCAREPLAN_GEN_ALL_CORE_FT
PRINCIPAL DISCHARGE DIAGNOSIS  Diagnosis: Seizures  Assessment and Plan of Treatment: video EEG normal  continue home meds  follow up outapatient with neurology - Dr. Rodriguez - follow up results of B12 and ferritin levels outpatient with neurology

## 2020-09-16 NOTE — PROGRESS NOTE ADULT - ASSESSMENT
Ms. Hawkins is a 25 year old woman who is here for video EEG to evaluate for epilepsy vs non-epileptic seizures.  -Two events have been captured during stay which did not have any epileptiform correlate on EEG.   -EEG has been normal so far.   -Continue Lamictal 200 mg/day  -Zoloft being discontinued today  -Continue Gabapentin 300 mg/day  -Will check B12 and Ferritin as patient's  has noticed frequent movements of legs in sleep - labs drawn today and will f/u results as outpatient  -She will be d/c'd today and I will follow up with her as an outpatient.

## 2020-09-16 NOTE — DISCHARGE NOTE PROVIDER - NSDCCPTREATMENT_GEN_ALL_CORE_FT
PRINCIPAL PROCEDURE  Procedure: EEG awake and asleep  Findings and Treatment:   EEG Summary/Classification:  This is a normal video EEG in the awakeand asleep states.  The reported clinical event does not appear to be epileptic in nature.

## 2020-09-16 NOTE — DISCHARGE NOTE PROVIDER - HOSPITAL COURSE
25 year old woman admitted for video EEG to evaluate for epilepsy vs non-epileptic seizures.  Video EEG is normal.  Pt. is stable for discharge, will continue Lamictal 200 mg/day, Zoloft 25 mg/day   and Gabapentin 300 mg/day. B12 and ferritin level were tested, will follow up results   outpatient with neurology - Dr. Rodriguez.  Pt. is stable to be discharged, will follow up outpatient with Dr. Rodriguez in a week.    PHYSICAL EXAM:  Vital Signs Last 24 Hrs  T(C): 36.8 (16 Sep 2020 08:06), Max: 36.8 (16 Sep 2020 08:06)  T(F): 98.2 (16 Sep 2020 08:06), Max: 98.2 (16 Sep 2020 08:06)  HR: 69 (16 Sep 2020 08:06) (69 - 81)  BP: 102/59 (16 Sep 2020 08:06) (102/59 - 105/55)  BP(mean): --  RR: 16 (16 Sep 2020 08:06) (16 - 16)  SpO2: 100% (16 Sep 2020 08:06) (98% - 100%)  Constitutional: NAD, awake and alert, well-developed  HEENT: PERR, EOMI, Normal Hearing, MMM  Neck: Soft and supple, No LAD, No JVD  Respiratory: Breath sounds are clear bilaterally, No wheezing, rales or rhonchi  Cardiovascular: S1 and S2, regular rate and rhythm, no Murmurs, gallops or rubs  Gastrointestinal: Bowel Sounds present, soft, nontender, nondistended, no guarding, no rebound  Extremities: No peripheral edema  Vascular: 2+ peripheral pulses  Neurological: A/O x 3, no focal deficits  Musculoskeletal: 5/5 strength b/l upper and lower extremities  Skin: No rashes     25 year old woman admitted for video EEG to evaluate for epilepsy vs non-epileptic seizures.  Video EEG is normal.  Pt. is stable for discharge, will continue Lamictal 200 mg/day, Zoloft 25 mg/day   and Gabapentin 300 mg/day. B12 and ferritin level were tested, will follow up results   outpatient with neurology - Dr. Rodriguez.  Pt. is stable to be discharged, will follow up outpatient with Dr. Rodriguez in a week.    9/16: Seen and eval. Hemodynamicallys table. Denies any HA, CP. care discussed with Dr. Rodriguez - d/c planning.     PHYSICAL EXAM:  Vital Signs Last 24 Hrs  T(C): 36.8 (16 Sep 2020 08:06), Max: 36.8 (16 Sep 2020 08:06)  T(F): 98.2 (16 Sep 2020 08:06), Max: 98.2 (16 Sep 2020 08:06)  HR: 69 (16 Sep 2020 08:06) (69 - 81)  BP: 102/59 (16 Sep 2020 08:06) (102/59 - 105/55)  BP(mean): --  RR: 16 (16 Sep 2020 08:06) (16 - 16)  SpO2: 100% (16 Sep 2020 08:06) (98% - 100%)  Constitutional: NAD, awake and alert, well-developed  HEENT: PERR, EOMI, Normal Hearing, MMM  Neck: Soft and supple, No LAD, No JVD  Respiratory: Breath sounds are clear bilaterally, No wheezing, rales or rhonchi  Cardiovascular: S1 and S2, regular rate and rhythm, no Murmurs, gallops or rubs  Gastrointestinal: Bowel Sounds present, soft, nontender, nondistended, no guarding, no rebound  Extremities: No peripheral edema  Vascular: 2+ peripheral pulses  Neurological: A/O x 3, no focal deficits  Musculoskeletal: 5/5 strength b/l upper and lower extremities  Skin: No rashes

## 2020-09-16 NOTE — DISCHARGE NOTE NURSING/CASE MANAGEMENT/SOCIAL WORK - PATIENT PORTAL LINK FT
You can access the FollowMyHealth Patient Portal offered by [facility] by registering at the following website: [email or web address]. By joining [facility]’s FollowMyHealth portal, you will also be able to view your health information using other applications (apps) compatible with our system.

## 2020-09-16 NOTE — DISCHARGE NOTE PROVIDER - CARE PROVIDER_API CALL
Rolanda Rodriguez  CLINICAL NEUROPHYSIOLOGY  [address] [facility], [place] [address]  Phone: [phone]  Fax: [phone]  Follow Up Time:

## 2020-09-17 NOTE — CDI QUERY NOTE - NSCDIOTHERTXTBX_GEN_ALL_CORE_HH
The patient received a nutrition assessment by a Registered Dietician on 9/14/2020.    The documentation of this assessment states: Meets criteria for moderate protein-calorie malnutrition in the context of social and environmental circumstance.    Please clarify if you agree or disagree with the RD assessment of moderate protein calorie malnutrition     a) moderate protein-calorie malnutrition , poa  b) No clinical evidence of malnutrition  c) Other (please specify)  d) Clinically unable to be determined    Please document your response in your discharge summary

## 2020-09-18 DIAGNOSIS — F32.9 MAJOR DEPRESSIVE DISORDER, SINGLE EPISODE, UNSPECIFIED: ICD-10-CM

## 2020-09-18 DIAGNOSIS — R56.9 UNSPECIFIED CONVULSIONS: ICD-10-CM

## 2020-09-18 DIAGNOSIS — F41.9 ANXIETY DISORDER, UNSPECIFIED: ICD-10-CM

## 2020-09-18 DIAGNOSIS — K58.9 IRRITABLE BOWEL SYNDROME WITHOUT DIARRHEA: ICD-10-CM

## 2021-01-29 ENCOUNTER — RX RENEWAL (OUTPATIENT)
Age: 26
End: 2021-01-29

## 2021-01-29 RX ORDER — FOLIC ACID 1 MG/1
1 TABLET ORAL DAILY
Qty: 30 | Refills: 5 | Status: ACTIVE | COMMUNITY
Start: 2020-07-21 | End: 1900-01-01

## 2021-11-12 NOTE — DATA REVIEWED
The writer spoke with the patient and scheduled the patient for an ultrasound after her mammogram on 05/03/2022.   [de-identified] : 5/13/20: CT head unremarkable

## 2022-03-04 ENCOUNTER — NON-APPOINTMENT (OUTPATIENT)
Age: 27
End: 2022-03-04

## 2022-12-19 ENCOUNTER — EMERGENCY (EMERGENCY)
Facility: HOSPITAL | Age: 27
LOS: 0 days | Discharge: HOME | End: 2022-12-19
Attending: EMERGENCY MEDICINE
Payer: COMMERCIAL

## 2022-12-19 ENCOUNTER — INPATIENT (INPATIENT)
Facility: HOSPITAL | Age: 27
LOS: 0 days | Discharge: HOME | DRG: 694 | End: 2022-12-20
Attending: INTERNAL MEDICINE | Admitting: INTERNAL MEDICINE
Payer: COMMERCIAL

## 2022-12-19 VITALS
RESPIRATION RATE: 18 BRPM | OXYGEN SATURATION: 100 % | DIASTOLIC BLOOD PRESSURE: 85 MMHG | HEART RATE: 83 BPM | SYSTOLIC BLOOD PRESSURE: 129 MMHG

## 2022-12-19 VITALS
RESPIRATION RATE: 18 BRPM | HEIGHT: 64 IN | TEMPERATURE: 99 F | OXYGEN SATURATION: 98 % | SYSTOLIC BLOOD PRESSURE: 142 MMHG | WEIGHT: 134.92 LBS | HEART RATE: 98 BPM | DIASTOLIC BLOOD PRESSURE: 92 MMHG

## 2022-12-19 VITALS — WEIGHT: 134.92 LBS | HEIGHT: 64 IN

## 2022-12-19 DIAGNOSIS — R07.9 CHEST PAIN, UNSPECIFIED: ICD-10-CM

## 2022-12-19 DIAGNOSIS — R06.2 WHEEZING: ICD-10-CM

## 2022-12-19 DIAGNOSIS — R05.9 COUGH, UNSPECIFIED: ICD-10-CM

## 2022-12-19 DIAGNOSIS — N20.0 CALCULUS OF KIDNEY: ICD-10-CM

## 2022-12-19 DIAGNOSIS — F41.9 ANXIETY DISORDER, UNSPECIFIED: ICD-10-CM

## 2022-12-19 DIAGNOSIS — K58.9 IRRITABLE BOWEL SYNDROME WITHOUT DIARRHEA: ICD-10-CM

## 2022-12-19 DIAGNOSIS — F32.A DEPRESSION, UNSPECIFIED: ICD-10-CM

## 2022-12-19 LAB
ALBUMIN SERPL ELPH-MCNC: 3.9 G/DL — SIGNIFICANT CHANGE UP (ref 3.3–5)
ALP SERPL-CCNC: 95 U/L — SIGNIFICANT CHANGE UP (ref 40–120)
ALT FLD-CCNC: 22 U/L — SIGNIFICANT CHANGE UP (ref 12–78)
ANION GAP SERPL CALC-SCNC: 8 MMOL/L — SIGNIFICANT CHANGE UP (ref 5–17)
APPEARANCE UR: CLEAR — SIGNIFICANT CHANGE UP
AST SERPL-CCNC: 13 U/L — LOW (ref 15–37)
BASOPHILS # BLD AUTO: 0.01 K/UL — SIGNIFICANT CHANGE UP (ref 0–0.2)
BASOPHILS NFR BLD AUTO: 0.1 % — SIGNIFICANT CHANGE UP (ref 0–2)
BILIRUB SERPL-MCNC: 0.5 MG/DL — SIGNIFICANT CHANGE UP (ref 0.2–1.2)
BILIRUB UR-MCNC: NEGATIVE — SIGNIFICANT CHANGE UP
BUN SERPL-MCNC: 11 MG/DL — SIGNIFICANT CHANGE UP (ref 7–23)
CALCIUM SERPL-MCNC: 9.6 MG/DL — SIGNIFICANT CHANGE UP (ref 8.5–10.1)
CHLORIDE SERPL-SCNC: 107 MMOL/L — SIGNIFICANT CHANGE UP (ref 96–108)
CO2 SERPL-SCNC: 25 MMOL/L — SIGNIFICANT CHANGE UP (ref 22–31)
COLOR SPEC: YELLOW — SIGNIFICANT CHANGE UP
CREAT SERPL-MCNC: 0.82 MG/DL — SIGNIFICANT CHANGE UP (ref 0.5–1.3)
DIFF PNL FLD: ABNORMAL
EGFR: 99 ML/MIN/1.73M2 — SIGNIFICANT CHANGE UP
EOSINOPHIL # BLD AUTO: 0 K/UL — SIGNIFICANT CHANGE UP (ref 0–0.5)
EOSINOPHIL NFR BLD AUTO: 0 % — SIGNIFICANT CHANGE UP (ref 0–6)
GLUCOSE SERPL-MCNC: 175 MG/DL — HIGH (ref 70–99)
GLUCOSE UR QL: NEGATIVE — SIGNIFICANT CHANGE UP
HCG SERPL-ACNC: <1 MIU/ML — SIGNIFICANT CHANGE UP
HCT VFR BLD CALC: 37.9 % — SIGNIFICANT CHANGE UP (ref 34.5–45)
HGB BLD-MCNC: 13.3 G/DL — SIGNIFICANT CHANGE UP (ref 11.5–15.5)
IMM GRANULOCYTES NFR BLD AUTO: 0.2 % — SIGNIFICANT CHANGE UP (ref 0–0.9)
KETONES UR-MCNC: NEGATIVE — SIGNIFICANT CHANGE UP
LEUKOCYTE ESTERASE UR-ACNC: ABNORMAL
LIDOCAIN IGE QN: 49 U/L — LOW (ref 73–393)
LYMPHOCYTES # BLD AUTO: 1 K/UL — SIGNIFICANT CHANGE UP (ref 1–3.3)
LYMPHOCYTES # BLD AUTO: 10.3 % — LOW (ref 13–44)
MCHC RBC-ENTMCNC: 31.2 PG — SIGNIFICANT CHANGE UP (ref 27–34)
MCHC RBC-ENTMCNC: 35.1 GM/DL — SIGNIFICANT CHANGE UP (ref 32–36)
MCV RBC AUTO: 89 FL — SIGNIFICANT CHANGE UP (ref 80–100)
MONOCYTES # BLD AUTO: 0.08 K/UL — SIGNIFICANT CHANGE UP (ref 0–0.9)
MONOCYTES NFR BLD AUTO: 0.8 % — LOW (ref 2–14)
NEUTROPHILS # BLD AUTO: 8.58 K/UL — HIGH (ref 1.8–7.4)
NEUTROPHILS NFR BLD AUTO: 88.6 % — HIGH (ref 43–77)
NITRITE UR-MCNC: NEGATIVE — SIGNIFICANT CHANGE UP
PH UR: 5 — SIGNIFICANT CHANGE UP (ref 5–8)
PLATELET # BLD AUTO: 325 K/UL — SIGNIFICANT CHANGE UP (ref 150–400)
POTASSIUM SERPL-MCNC: 4.3 MMOL/L — SIGNIFICANT CHANGE UP (ref 3.5–5.3)
POTASSIUM SERPL-SCNC: 4.3 MMOL/L — SIGNIFICANT CHANGE UP (ref 3.5–5.3)
PROT SERPL-MCNC: 8.5 GM/DL — HIGH (ref 6–8.3)
PROT UR-MCNC: NEGATIVE — SIGNIFICANT CHANGE UP
RBC # BLD: 4.26 M/UL — SIGNIFICANT CHANGE UP (ref 3.8–5.2)
RBC # FLD: 12.1 % — SIGNIFICANT CHANGE UP (ref 10.3–14.5)
SODIUM SERPL-SCNC: 140 MMOL/L — SIGNIFICANT CHANGE UP (ref 135–145)
SP GR SPEC: 1 — LOW (ref 1.01–1.02)
UROBILINOGEN FLD QL: NEGATIVE — SIGNIFICANT CHANGE UP
WBC # BLD: 9.69 K/UL — SIGNIFICANT CHANGE UP (ref 3.8–10.5)
WBC # FLD AUTO: 9.69 K/UL — SIGNIFICANT CHANGE UP (ref 3.8–10.5)

## 2022-12-19 PROCEDURE — G1004: CPT

## 2022-12-19 PROCEDURE — 76856 US EXAM PELVIC COMPLETE: CPT | Mod: 26

## 2022-12-19 PROCEDURE — 87040 BLOOD CULTURE FOR BACTERIA: CPT

## 2022-12-19 PROCEDURE — 83036 HEMOGLOBIN GLYCOSYLATED A1C: CPT

## 2022-12-19 PROCEDURE — 36415 COLL VENOUS BLD VENIPUNCTURE: CPT

## 2022-12-19 PROCEDURE — 94640 AIRWAY INHALATION TREATMENT: CPT

## 2022-12-19 PROCEDURE — 71046 X-RAY EXAM CHEST 2 VIEWS: CPT | Mod: 26

## 2022-12-19 PROCEDURE — 93010 ELECTROCARDIOGRAM REPORT: CPT

## 2022-12-19 PROCEDURE — 99284 EMERGENCY DEPT VISIT MOD MDM: CPT

## 2022-12-19 PROCEDURE — 99285 EMERGENCY DEPT VISIT HI MDM: CPT

## 2022-12-19 PROCEDURE — 82365 CALCULUS SPECTROSCOPY: CPT

## 2022-12-19 PROCEDURE — 88300 SURGICAL PATH GROSS: CPT

## 2022-12-19 PROCEDURE — 85025 COMPLETE CBC W/AUTO DIFF WBC: CPT

## 2022-12-19 PROCEDURE — 74177 CT ABD & PELVIS W/CONTRAST: CPT | Mod: 26,ME

## 2022-12-19 PROCEDURE — 80048 BASIC METABOLIC PNL TOTAL CA: CPT

## 2022-12-19 RX ORDER — HYDROMORPHONE HYDROCHLORIDE 2 MG/ML
0.5 INJECTION INTRAMUSCULAR; INTRAVENOUS; SUBCUTANEOUS ONCE
Refills: 0 | Status: DISCONTINUED | OUTPATIENT
Start: 2022-12-19 | End: 2022-12-19

## 2022-12-19 RX ORDER — ONDANSETRON 8 MG/1
4 TABLET, FILM COATED ORAL ONCE
Refills: 0 | Status: COMPLETED | OUTPATIENT
Start: 2022-12-19 | End: 2022-12-19

## 2022-12-19 RX ORDER — TAMSULOSIN HYDROCHLORIDE 0.4 MG/1
1 CAPSULE ORAL
Qty: 10 | Refills: 0
Start: 2022-12-19 | End: 2022-12-28

## 2022-12-19 RX ORDER — SERTRALINE 25 MG/1
1 TABLET, FILM COATED ORAL
Qty: 0 | Refills: 0 | DISCHARGE

## 2022-12-19 RX ORDER — CLONAZEPAM 1 MG
1 TABLET ORAL
Qty: 0 | Refills: 0 | DISCHARGE

## 2022-12-19 RX ORDER — GABAPENTIN 400 MG/1
1 CAPSULE ORAL
Qty: 0 | Refills: 0 | DISCHARGE

## 2022-12-19 RX ORDER — DEXAMETHASONE 0.5 MG/5ML
10 ELIXIR ORAL ONCE
Refills: 0 | Status: COMPLETED | OUTPATIENT
Start: 2022-12-19 | End: 2022-12-19

## 2022-12-19 RX ORDER — OXYCODONE HYDROCHLORIDE 5 MG/1
1 TABLET ORAL
Qty: 6 | Refills: 0
Start: 2022-12-19

## 2022-12-19 RX ORDER — ONDANSETRON 8 MG/1
4 TABLET, FILM COATED ORAL ONCE
Refills: 0 | Status: DISCONTINUED | OUTPATIENT
Start: 2022-12-19 | End: 2022-12-20

## 2022-12-19 RX ORDER — ALBUTEROL 90 UG/1
2 AEROSOL, METERED ORAL
Qty: 1 | Refills: 0
Start: 2022-12-19

## 2022-12-19 RX ORDER — DEXAMETHASONE 0.5 MG/5ML
1 ELIXIR ORAL
Qty: 1 | Refills: 0
Start: 2022-12-19 | End: 2022-12-19

## 2022-12-19 RX ORDER — SODIUM CHLORIDE 9 MG/ML
1000 INJECTION INTRAMUSCULAR; INTRAVENOUS; SUBCUTANEOUS ONCE
Refills: 0 | Status: COMPLETED | OUTPATIENT
Start: 2022-12-19 | End: 2022-12-19

## 2022-12-19 RX ORDER — LAMOTRIGINE 25 MG/1
1 TABLET, ORALLY DISINTEGRATING ORAL
Qty: 0 | Refills: 0 | DISCHARGE

## 2022-12-19 RX ORDER — MORPHINE SULFATE 50 MG/1
4 CAPSULE, EXTENDED RELEASE ORAL ONCE
Refills: 0 | Status: DISCONTINUED | OUTPATIENT
Start: 2022-12-19 | End: 2022-12-19

## 2022-12-19 RX ORDER — KETOROLAC TROMETHAMINE 30 MG/ML
30 SYRINGE (ML) INJECTION ONCE
Refills: 0 | Status: DISCONTINUED | OUTPATIENT
Start: 2022-12-19 | End: 2022-12-19

## 2022-12-19 RX ORDER — ONDANSETRON 8 MG/1
1 TABLET, FILM COATED ORAL
Qty: 9 | Refills: 0
Start: 2022-12-19

## 2022-12-19 RX ORDER — ACETAMINOPHEN 500 MG
1000 TABLET ORAL ONCE
Refills: 0 | Status: COMPLETED | OUTPATIENT
Start: 2022-12-19 | End: 2022-12-19

## 2022-12-19 RX ORDER — ALBUTEROL 90 UG/1
2 AEROSOL, METERED ORAL ONCE
Refills: 0 | Status: COMPLETED | OUTPATIENT
Start: 2022-12-19 | End: 2022-12-19

## 2022-12-19 RX ORDER — CEFTRIAXONE 500 MG/1
1000 INJECTION, POWDER, FOR SOLUTION INTRAMUSCULAR; INTRAVENOUS ONCE
Refills: 0 | Status: COMPLETED | OUTPATIENT
Start: 2022-12-19 | End: 2022-12-19

## 2022-12-19 RX ORDER — NORETHINDRONE AND ETHINYL ESTRADIOL 0.4-0.035
1 KIT ORAL
Qty: 0 | Refills: 0 | DISCHARGE

## 2022-12-19 RX ORDER — TAMSULOSIN HYDROCHLORIDE 0.4 MG/1
0.4 CAPSULE ORAL AT BEDTIME
Refills: 0 | Status: DISCONTINUED | OUTPATIENT
Start: 2022-12-19 | End: 2022-12-20

## 2022-12-19 RX ORDER — CEFTRIAXONE 500 MG/1
1000 INJECTION, POWDER, FOR SOLUTION INTRAMUSCULAR; INTRAVENOUS ONCE
Refills: 0 | Status: DISCONTINUED | OUTPATIENT
Start: 2022-12-19 | End: 2022-12-19

## 2022-12-19 RX ADMIN — Medication 10 MILLIGRAM(S): at 09:54

## 2022-12-19 RX ADMIN — HYDROMORPHONE HYDROCHLORIDE 0.5 MILLIGRAM(S): 2 INJECTION INTRAMUSCULAR; INTRAVENOUS; SUBCUTANEOUS at 17:50

## 2022-12-19 RX ADMIN — HYDROMORPHONE HYDROCHLORIDE 0.5 MILLIGRAM(S): 2 INJECTION INTRAMUSCULAR; INTRAVENOUS; SUBCUTANEOUS at 18:05

## 2022-12-19 RX ADMIN — Medication 30 MILLIGRAM(S): at 19:44

## 2022-12-19 RX ADMIN — HYDROMORPHONE HYDROCHLORIDE 0.5 MILLIGRAM(S): 2 INJECTION INTRAMUSCULAR; INTRAVENOUS; SUBCUTANEOUS at 22:58

## 2022-12-19 RX ADMIN — SODIUM CHLORIDE 1000 MILLILITER(S): 9 INJECTION INTRAMUSCULAR; INTRAVENOUS; SUBCUTANEOUS at 20:04

## 2022-12-19 RX ADMIN — SODIUM CHLORIDE 1000 MILLILITER(S): 9 INJECTION INTRAMUSCULAR; INTRAVENOUS; SUBCUTANEOUS at 17:20

## 2022-12-19 RX ADMIN — ONDANSETRON 4 MILLIGRAM(S): 8 TABLET, FILM COATED ORAL at 20:04

## 2022-12-19 RX ADMIN — TAMSULOSIN HYDROCHLORIDE 0.4 MILLIGRAM(S): 0.4 CAPSULE ORAL at 21:24

## 2022-12-19 RX ADMIN — ALBUTEROL 2 PUFF(S): 90 AEROSOL, METERED ORAL at 09:53

## 2022-12-19 RX ADMIN — Medication 400 MILLIGRAM(S): at 21:25

## 2022-12-19 RX ADMIN — CEFTRIAXONE 1000 MILLIGRAM(S): 500 INJECTION, POWDER, FOR SOLUTION INTRAMUSCULAR; INTRAVENOUS at 21:34

## 2022-12-19 RX ADMIN — ONDANSETRON 4 MILLIGRAM(S): 8 TABLET, FILM COATED ORAL at 17:07

## 2022-12-19 RX ADMIN — MORPHINE SULFATE 4 MILLIGRAM(S): 50 CAPSULE, EXTENDED RELEASE ORAL at 17:07

## 2022-12-19 NOTE — ED PROVIDER NOTE - PATIENT PORTAL LINK FT
You can access the FollowMyHealth Patient Portal offered by [facility] by registering at the following website: [email or web address]. By joining [facility]’s FollowMyHealth portal, you will also be able to view your health information using other applications (apps) compatible with our system.

## 2022-12-19 NOTE — ED STATDOCS - PHYSICAL EXAMINATION
Constitutional: Awake, Alert, non-toxic. No acute distress. Well appearing, well nourished.   HEAD: Normocephalic, atraumatic.   EYES: PERRL, EOM intact, conjunctiva and sclera are clear bilaterally.  ENT: External ears normal. No rhinorrhea, no tracheal deviation   NECK: Supple, non-tender  CARDIOVASCULAR: regular rate and rhythm.  RESPIRATORY: Normal respiratory effort; breath sounds CTAB, no wheezes, rhonchi, or rales. Speaking in full sentences. No accessory muscle use.   ABDOMEN: Soft; RLQ and suprapubic TTP, neg rovsing's sign, non-distended. No rebound or guarding.   EXTREMITIES:  no lower extremity edema, no deformities  SKIN: Warm, dry  NEURO: A&O x3. Sensory and motor functions are grossly intact. Speech is normal. No facial droop.  PSYCH: Appearance and judgement seem appropriate for gender and age.

## 2022-12-19 NOTE — PHARMACOTHERAPY INTERVENTION NOTE - COMMENTS
Medication reconciliation completed.  Reviewed Medication list and confirmed med allergies with patient; confirmed with Dr. First MedHx.  Pt was in [facility] earlier today and D/C'd with meds, but states that she did not start the medications that she was sent home with earlier today.

## 2022-12-19 NOTE — ED PROVIDER NOTE - OBJECTIVE STATEMENT
28 y/o female PMHx of anxiety, IBS, and depression presents to the ED c/o cough, chest pain and wheezing x1 week, worsening overnight. Pt denies fevers. Pt reports she took Nyquil with no relief. Pt covid negative on home test. Pt reports she has been vaccinated against covid but has not received her flu shot. 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

## 2022-12-19 NOTE — ED STATDOCS - PROGRESS NOTE DETAILS
Patient now willing to go to imaging after three attempts to take her.  Explained to patient importance of prompt imaging in the setting of her severe pain. Pain controlled somewhat, reports she still is in pain but more drowsy and is tolerating it.  Imaging pending -Brisa Mays PA-C Patient with +2mm stone at the [facility].  Still reporting some pain, improved s/p toradol.  Urine pending, will likely d/c with urology follow up -Brisa Mays PA-C signed Brittany Millard PA-C   Pt still with pain and does not feel comfortable to DC home with PO meds. Will admit for inpt treatment. Pt agrees with admission and plan of care. Case discussed with and admission accepted by hospitalist Dr. Page.

## 2022-12-19 NOTE — ED ADULT NURSE NOTE - OBJECTIVE STATEMENT
pt arrived c/o dry cough and nausea for the past week. pt endorses chest discomfort when coughing. no distress noted. 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

## 2022-12-19 NOTE — ED ADULT TRIAGE NOTE - CHIEF COMPLAINT QUOTE
Pt presents to the ED c/o cough, chest pain and wheezing x1 week, worsening overnight. Denies fevers or PMH. Pt covid negative on home test. SpO2 100% and  in triage. Pt sent for EKG.

## 2022-12-19 NOTE — ED ADULT NURSE NOTE - OBJECTIVE STATEMENT
Pt comes to Ed c/o RLQ abdominal pain radiating to back. Pt also c/o nausea. Pt was in ED earlier this AM r/t cough - this is a different chief complaint. Pt has hx IBS, seizures, and gall bladder removed. Pt has KNDA.

## 2022-12-19 NOTE — ED PEDIATRIC NURSE REASSESSMENT NOTE - NS ED NURSE REASSESS COMMENT FT2
pt give 5 dexamethasone tablets as per MD. pt unable to swallow last two pills as they broke in half. pills discarded and pt given two new whole pills.

## 2022-12-19 NOTE — ED STATDOCS - OBJECTIVE STATEMENT
Patient with a past medical history of seizures, prior cholecystectomy is presenting with right lower quadrant pain.  Started about 1 hour prior to arrival.  States the pain is in the right lower quadrant and radiates towards her right lower back.  Has been constant.  Associate with nausea but no vomiting.  No changes with bowel movements or burning with urination.  States she has had a kidney stone before but this feels different.  She denies any vaginal bleeding.  States that she is not pregnant today.  Denies any vaginal discharge. Patient with a past medical history of seizures, prior cholecystectomy is presenting with right lower quadrant pain.  Started about 1 hour prior to arrival.  States the pain is in the right lower quadrant and radiates towards her right lower back.  Has been constant.  Associate with nausea but no vomiting.  No changes with bowel movements or burning with urination.  States she has had a kidney stone before but this feels different.  She denies any vaginal bleeding.  States that she is not pregnant today.  Denies any vaginal discharge. This is different than why she came to the ED earlier today.

## 2022-12-19 NOTE — ED STATDOCS - NS ED ATTENDING STATEMENT MOD
This was a shared visit with the SANDRITA. I reviewed and verified the documentation and independently performed the documented:

## 2022-12-19 NOTE — ED STATDOCS - CARE PROVIDER_API CALL
Fran Gordon)  Urology  [address], [address] [place] [address]  Phone: [phone]  Fax: [phone]  Follow Up Time:

## 2022-12-19 NOTE — ED STATDOCS - CLINICAL SUMMARY MEDICAL DECISION MAKING FREE TEXT BOX
With acute onset right lower quadrant abdominal pain, concern for ovarian torsion versus ruptured ovarian cyst versus appendicitis.  Less likely urinary pathology given no dysuria, polyuria or hematuria.  With no upper abdominal pain, less likely biliary pathology or pancreatitis, as well as patient already having her gallbladder out.  We will plan for lab work, imaging, pain control.  Patient states that she has had difficulty with transvaginal ultrasound before however states that with pain control she would be willing to try today.

## 2022-12-19 NOTE — ED STATDOCS - CARE PLAN
1 Principal Discharge DX:	Kidney stone   Principal Discharge DX:	Kidney stone  Secondary Diagnosis:	Flank pain

## 2022-12-19 NOTE — ED PROVIDER NOTE - CLINICAL SUMMARY MEDICAL DECISION MAKING FREE TEXT BOX
26 y/o female presents to the ED with cough. Plan: chest X-ray, flu/covid swab, albuterol and dexamethasone.

## 2022-12-19 NOTE — ED PROVIDER NOTE - NSFOLLOWUPINSTRUCTIONS_ED_ALL_ED_FT
Please call and follow up with your doctor in 1-3 days.    Return to the Emergency Department for worsening or persistent symptoms, and/or ANY NEW OR CONCERNING SYMPTOMS. If you have issues obtaining follow up, please call: [phone] or [phone]  to obtain a doctor or specialist who takes your insurance in your area.    Acute Cough    WHAT YOU NEED TO KNOW:    An acute cough can last up to 3 weeks. Common causes of an acute cough include a cold, allergies, or a lung infection.     DISCHARGE INSTRUCTIONS:    Return to the emergency department if:     You have trouble breathing or feel short of breath.      You cough up blood, or you see blood in your mucus.       You faint or feel weak or dizzy.       You have chest pain when you cough or take a deep breath.       You have new wheezing.     Contact your healthcare provider if:     You have a fever.       Your cough lasts longer than 4 weeks.       Your symptoms do not improve with treatment.       You have questions or concerns about your condition or care.     Medicines:     Medicines may be needed to stop the cough, decrease swelling in your airways, or help open your airways. Medicine may also be given to help you cough up mucus. Ask your healthcare provider what over-the-counter medicines you can take. If you have an infection caused by bacteria, you may need antibiotics.       Take your medicine as directed. Contact your healthcare provider if you think your medicine is not helping or if you have side effects. Tell him or her if you are allergic to any medicine. Keep a list of the medicines, vitamins, and herbs you take. Include the amounts, and when and why you take them. Bring the list or the pill bottles to follow-up visits. Carry your medicine list with you in case of an emergency.    Manage your symptoms:     Do not smoke and stay away from others who smoke. Nicotine and other chemicals in cigarettes and cigars can cause lung damage and make your cough worse. Ask your healthcare provider for information if you currently smoke and need help to quit. E-cigarettes or smokeless tobacco still contain nicotine. Talk to your healthcare provider before you use these products.       Drink extra liquids as directed. Liquids will help thin and loosen mucus so you can cough it up. Liquids will also help prevent dehydration. Examples of good liquids to drink include water, fruit juice, and broth. Do not drink liquids that contain caffeine. Caffeine can increase your risk for dehydration. Ask your healthcare provider how much liquid to drink each day.       Rest as directed. Do not do activities that make your cough worse, such as exercise.       Use a humidifier or vaporizer. Use a cool mist humidifier or a vaporizer to increase air moisture in your home. This may make it easier for you to breathe and help decrease your cough.       Eat 2 to 5 mL of honey 2 times each day. Honey can help thin mucus and decrease your cough.       Use cough drops or lozenges. These can help decrease throat irritation and your cough.     Follow up with your healthcare provider as directed: Write down your questions so you remember to ask them during your visits.

## 2022-12-19 NOTE — ED ADULT TRIAGE NOTE - CHIEF COMPLAINT QUOTE
Pt presents to the ED c/o severe RLQ pain x45 minutes. Denies n/v. No medication PTA. Pt seen in ED earlier today for a cough.

## 2022-12-20 ENCOUNTER — TRANSCRIPTION ENCOUNTER (OUTPATIENT)
Age: 27
End: 2022-12-20

## 2022-12-20 VITALS
RESPIRATION RATE: 18 BRPM | HEART RATE: 90 BPM | OXYGEN SATURATION: 98 % | DIASTOLIC BLOOD PRESSURE: 68 MMHG | TEMPERATURE: 99 F | SYSTOLIC BLOOD PRESSURE: 114 MMHG

## 2022-12-20 DIAGNOSIS — Z90.49 ACQUIRED ABSENCE OF OTHER SPECIFIED PARTS OF DIGESTIVE TRACT: Chronic | ICD-10-CM

## 2022-12-20 DIAGNOSIS — Z98.890 OTHER SPECIFIED POSTPROCEDURAL STATES: Chronic | ICD-10-CM

## 2022-12-20 LAB
A1C WITH ESTIMATED AVERAGE GLUCOSE RESULT: 5.3 % — SIGNIFICANT CHANGE UP (ref 4–5.6)
ANION GAP SERPL CALC-SCNC: 7 MMOL/L — SIGNIFICANT CHANGE UP (ref 5–17)
BASOPHILS # BLD AUTO: 0.01 K/UL — SIGNIFICANT CHANGE UP (ref 0–0.2)
BASOPHILS NFR BLD AUTO: 0.1 % — SIGNIFICANT CHANGE UP (ref 0–2)
BUN SERPL-MCNC: 10 MG/DL — SIGNIFICANT CHANGE UP (ref 7–23)
CALCIUM SERPL-MCNC: 8.4 MG/DL — LOW (ref 8.5–10.1)
CHLORIDE SERPL-SCNC: 107 MMOL/L — SIGNIFICANT CHANGE UP (ref 96–108)
CO2 SERPL-SCNC: 26 MMOL/L — SIGNIFICANT CHANGE UP (ref 22–31)
CREAT SERPL-MCNC: 0.76 MG/DL — SIGNIFICANT CHANGE UP (ref 0.5–1.3)
EGFR: 109 ML/MIN/1.73M2 — SIGNIFICANT CHANGE UP
EOSINOPHIL # BLD AUTO: 0 K/UL — SIGNIFICANT CHANGE UP (ref 0–0.5)
EOSINOPHIL NFR BLD AUTO: 0 % — SIGNIFICANT CHANGE UP (ref 0–6)
ESTIMATED AVERAGE GLUCOSE: 105 MG/DL — SIGNIFICANT CHANGE UP (ref 68–114)
FLUAV AG NPH QL: SIGNIFICANT CHANGE UP
FLUBV AG NPH QL: SIGNIFICANT CHANGE UP
GLUCOSE SERPL-MCNC: 102 MG/DL — HIGH (ref 70–99)
HCT VFR BLD CALC: 31.8 % — LOW (ref 34.5–45)
HGB BLD-MCNC: 11 G/DL — LOW (ref 11.5–15.5)
IMM GRANULOCYTES NFR BLD AUTO: 0.6 % — SIGNIFICANT CHANGE UP (ref 0–0.9)
LYMPHOCYTES # BLD AUTO: 1.59 K/UL — SIGNIFICANT CHANGE UP (ref 1–3.3)
LYMPHOCYTES # BLD AUTO: 15.2 % — SIGNIFICANT CHANGE UP (ref 13–44)
MCHC RBC-ENTMCNC: 30.8 PG — SIGNIFICANT CHANGE UP (ref 27–34)
MCHC RBC-ENTMCNC: 34.6 GM/DL — SIGNIFICANT CHANGE UP (ref 32–36)
MCV RBC AUTO: 89.1 FL — SIGNIFICANT CHANGE UP (ref 80–100)
MONOCYTES # BLD AUTO: 1.07 K/UL — HIGH (ref 0–0.9)
MONOCYTES NFR BLD AUTO: 10.2 % — SIGNIFICANT CHANGE UP (ref 2–14)
NEUTROPHILS # BLD AUTO: 7.74 K/UL — HIGH (ref 1.8–7.4)
NEUTROPHILS NFR BLD AUTO: 73.9 % — SIGNIFICANT CHANGE UP (ref 43–77)
PLATELET # BLD AUTO: 264 K/UL — SIGNIFICANT CHANGE UP (ref 150–400)
POTASSIUM SERPL-MCNC: 3.8 MMOL/L — SIGNIFICANT CHANGE UP (ref 3.5–5.3)
POTASSIUM SERPL-SCNC: 3.8 MMOL/L — SIGNIFICANT CHANGE UP (ref 3.5–5.3)
RBC # BLD: 3.57 M/UL — LOW (ref 3.8–5.2)
RBC # FLD: 12 % — SIGNIFICANT CHANGE UP (ref 10.3–14.5)
RSV RNA NPH QL NAA+NON-PROBE: SIGNIFICANT CHANGE UP
SARS-COV-2 RNA SPEC QL NAA+PROBE: SIGNIFICANT CHANGE UP
SODIUM SERPL-SCNC: 140 MMOL/L — SIGNIFICANT CHANGE UP (ref 135–145)
WBC # BLD: 10.47 K/UL — SIGNIFICANT CHANGE UP (ref 3.8–10.5)
WBC # FLD AUTO: 10.47 K/UL — SIGNIFICANT CHANGE UP (ref 3.8–10.5)

## 2022-12-20 PROCEDURE — 99236 HOSP IP/OBS SAME DATE HI 85: CPT

## 2022-12-20 PROCEDURE — 12345: CPT | Mod: NC

## 2022-12-20 PROCEDURE — 88300 SURGICAL PATH GROSS: CPT | Mod: 26

## 2022-12-20 RX ORDER — SODIUM CHLORIDE 9 MG/ML
1000 INJECTION INTRAMUSCULAR; INTRAVENOUS; SUBCUTANEOUS
Refills: 0 | Status: DISCONTINUED | OUTPATIENT
Start: 2022-12-20 | End: 2022-12-20

## 2022-12-20 RX ORDER — ERGOCALCIFEROL 1.25 MG/1
50000 CAPSULE ORAL
Refills: 0 | Status: DISCONTINUED | OUTPATIENT
Start: 2022-12-20 | End: 2022-12-20

## 2022-12-20 RX ORDER — SENNA PLUS 8.6 MG/1
2 TABLET ORAL AT BEDTIME
Refills: 0 | Status: DISCONTINUED | OUTPATIENT
Start: 2022-12-20 | End: 2022-12-20

## 2022-12-20 RX ORDER — MORPHINE SULFATE 50 MG/1
2 CAPSULE, EXTENDED RELEASE ORAL EVERY 4 HOURS
Refills: 0 | Status: DISCONTINUED | OUTPATIENT
Start: 2022-12-20 | End: 2022-12-20

## 2022-12-20 RX ORDER — INFLUENZA VIRUS VACCINE 15; 15; 15; 15 UG/.5ML; UG/.5ML; UG/.5ML; UG/.5ML
0.5 SUSPENSION INTRAMUSCULAR ONCE
Refills: 0 | Status: COMPLETED | OUTPATIENT
Start: 2022-12-20 | End: 2022-12-20

## 2022-12-20 RX ORDER — ACETAMINOPHEN 500 MG
650 TABLET ORAL EVERY 6 HOURS
Refills: 0 | Status: DISCONTINUED | OUTPATIENT
Start: 2022-12-20 | End: 2022-12-20

## 2022-12-20 RX ORDER — FOLIC ACID 0.8 MG
1 TABLET ORAL DAILY
Refills: 0 | Status: DISCONTINUED | OUTPATIENT
Start: 2022-12-20 | End: 2022-12-20

## 2022-12-20 RX ORDER — ONDANSETRON 8 MG/1
4 TABLET, FILM COATED ORAL EVERY 8 HOURS
Refills: 0 | Status: DISCONTINUED | OUTPATIENT
Start: 2022-12-20 | End: 2022-12-20

## 2022-12-20 RX ORDER — LANOLIN ALCOHOL/MO/W.PET/CERES
3 CREAM (GRAM) TOPICAL AT BEDTIME
Refills: 0 | Status: DISCONTINUED | OUTPATIENT
Start: 2022-12-20 | End: 2022-12-20

## 2022-12-20 RX ORDER — ALBUTEROL 90 UG/1
2 AEROSOL, METERED ORAL EVERY 6 HOURS
Refills: 0 | Status: DISCONTINUED | OUTPATIENT
Start: 2022-12-20 | End: 2022-12-20

## 2022-12-20 RX ORDER — NALOXONE HYDROCHLORIDE 4 MG/.1ML
0.4 SPRAY NASAL ONCE
Refills: 0 | Status: DISCONTINUED | OUTPATIENT
Start: 2022-12-20 | End: 2022-12-20

## 2022-12-20 RX ORDER — ACETAMINOPHEN 500 MG
2 TABLET ORAL
Qty: 0 | Refills: 0 | DISCHARGE
Start: 2022-12-20

## 2022-12-20 RX ORDER — LIDOCAINE 4 G/100G
1 CREAM TOPICAL DAILY
Refills: 0 | Status: DISCONTINUED | OUTPATIENT
Start: 2022-12-20 | End: 2022-12-20

## 2022-12-20 RX ORDER — MORPHINE SULFATE 50 MG/1
4 CAPSULE, EXTENDED RELEASE ORAL EVERY 4 HOURS
Refills: 0 | Status: DISCONTINUED | OUTPATIENT
Start: 2022-12-20 | End: 2022-12-20

## 2022-12-20 RX ORDER — CEFUROXIME AXETIL 250 MG
1 TABLET ORAL
Qty: 14 | Refills: 0
Start: 2022-12-20 | End: 2022-12-26

## 2022-12-20 RX ORDER — SENNA PLUS 8.6 MG/1
2 TABLET ORAL
Qty: 0 | Refills: 0 | DISCHARGE
Start: 2022-12-20

## 2022-12-20 RX ORDER — POLYETHYLENE GLYCOL 3350 17 G/17G
17 POWDER, FOR SOLUTION ORAL DAILY
Refills: 0 | Status: DISCONTINUED | OUTPATIENT
Start: 2022-12-20 | End: 2022-12-20

## 2022-12-20 RX ORDER — HYDROMORPHONE HYDROCHLORIDE 2 MG/ML
0.25 INJECTION INTRAMUSCULAR; INTRAVENOUS; SUBCUTANEOUS EVERY 4 HOURS
Refills: 0 | Status: DISCONTINUED | OUTPATIENT
Start: 2022-12-20 | End: 2022-12-20

## 2022-12-20 RX ORDER — HYDROMORPHONE HYDROCHLORIDE 2 MG/ML
0.5 INJECTION INTRAMUSCULAR; INTRAVENOUS; SUBCUTANEOUS EVERY 4 HOURS
Refills: 0 | Status: DISCONTINUED | OUTPATIENT
Start: 2022-12-20 | End: 2022-12-20

## 2022-12-20 RX ORDER — CEFTRIAXONE 500 MG/1
2000 INJECTION, POWDER, FOR SOLUTION INTRAMUSCULAR; INTRAVENOUS EVERY 24 HOURS
Refills: 0 | Status: DISCONTINUED | OUTPATIENT
Start: 2022-12-20 | End: 2022-12-20

## 2022-12-20 RX ORDER — HYDROMORPHONE HYDROCHLORIDE 2 MG/ML
0.5 INJECTION INTRAMUSCULAR; INTRAVENOUS; SUBCUTANEOUS ONCE
Refills: 0 | Status: DISCONTINUED | OUTPATIENT
Start: 2022-12-20 | End: 2022-12-20

## 2022-12-20 RX ADMIN — Medication 1 MILLIGRAM(S): at 09:25

## 2022-12-20 RX ADMIN — ONDANSETRON 4 MILLIGRAM(S): 8 TABLET, FILM COATED ORAL at 05:17

## 2022-12-20 RX ADMIN — CEFTRIAXONE 2000 MILLIGRAM(S): 500 INJECTION, POWDER, FOR SOLUTION INTRAMUSCULAR; INTRAVENOUS at 09:25

## 2022-12-20 RX ADMIN — Medication 1200 MILLIGRAM(S): at 09:24

## 2022-12-20 RX ADMIN — Medication 1 TABLET(S): at 09:24

## 2022-12-20 RX ADMIN — LIDOCAINE 1 PATCH: 4 CREAM TOPICAL at 09:25

## 2022-12-20 RX ADMIN — HYDROMORPHONE HYDROCHLORIDE 0.5 MILLIGRAM(S): 2 INJECTION INTRAMUSCULAR; INTRAVENOUS; SUBCUTANEOUS at 05:17

## 2022-12-20 RX ADMIN — HYDROMORPHONE HYDROCHLORIDE 0.5 MILLIGRAM(S): 2 INJECTION INTRAMUSCULAR; INTRAVENOUS; SUBCUTANEOUS at 05:45

## 2022-12-20 RX ADMIN — ALBUTEROL 2 PUFF(S): 90 AEROSOL, METERED ORAL at 16:02

## 2022-12-20 RX ADMIN — SODIUM CHLORIDE 125 MILLILITER(S): 9 INJECTION INTRAMUSCULAR; INTRAVENOUS; SUBCUTANEOUS at 05:19

## 2022-12-20 NOTE — DISCHARGE NOTE PROVIDER - NSDCMRMEDTOKEN_GEN_ALL_CORE_FT
acetaminophen 325 mg oral tablet: 2 tab(s) orally every 6 hours, As needed, Temp greater or equal to 38C (100.4F), Mild Pain (1 - 3)  benzonatate 100 mg oral capsule: 1 cap(s) orally 3 times a day, As needed, Cough  cefuroxime 500 mg oral tablet: 1 tab(s) orally 2 times a day   dexamethasone 6 mg oral tablet: 1 tab(s) orally once a day on 22  Flomax 0.4 mg oral capsule: 1 cap(s) orally once a day (at bedtime)   folic acid 1 mg oral tablet: 1 tab(s) orally once a day  guaiFENesin 1200 mg oral tablet, extended release: 1 tab(s) orally every 12 hours  ondansetron 4 mg oral tablet, disintegratin tab(s) orally 3 times a day as needed for nausea  oxyCODONE 5 mg oral tablet: 1 tab(s) orally 3 times a day as needed for severe pain MDD:4  Prenatal Multivitamins: 2 tab(s) orally once a day  ProAir HFA 90 mcg/inh inhalation aerosol: 2 puff(s) inhaled every 4 hours, As Needed for wheeze/shortness of breath  senna leaf extract oral tablet: 2 tab(s) orally once a day (at bedtime), As Needed  Vitamin D2 1.25 mg (50,000 intl units) oral capsule: 1 cap(s) orally once a week on

## 2022-12-20 NOTE — PATIENT PROFILE ADULT - PRO INTERPRETER NEED 2
Pt to triage in wheelchair with mask in place. Pt's daughter states she noticed the patient was confused and delayed in her responses yesterday around noon. Pt complains of nausea, vomiting and fever onset Friday. Pt also reports pain when she urinates and abdominal pain. Reports partial Covid vaccination. Dr. Jamia Quach in to evaluate.
English

## 2022-12-20 NOTE — DISCHARGE NOTE PROVIDER - ATTENDING DISCHARGE PHYSICAL EXAMINATION:
PHYSICAL EXAM:  T(F): 98.3 (20 Dec 2022 11:57), Max: 98.9 (20 Dec 2022 01:35)  HR: 72 (20 Dec 2022 11:57) (72 - 96)  BP: 111/71 (20 Dec 2022 11:57) (99/55 - 126/72)  BP(mean): 65 (20 Dec 2022 08:24) (65 - 84)  RR: 18 (20 Dec 2022 11:57) (16 - 18)  SpO2: 100% (20 Dec 2022 11:57) (98% - 100%)room air  GENERAL: NAD, able to lie flat in bed  HEAD:  Atraumatic, Normocephalic  EYES: EOMI, PERRLA, normal sclera  ENT: Moist mucous membranes  NECK: Supple, No JVD, no nuchal rigidity  CHEST/LUNG: Clear to auscultation bilaterally; No rales, rhonchi, wheezing, or rubs. Unlabored respirations  HEART: Regular rate and rhythm; No murmurs, rubs, or gallops  ABDOMEN: Bowel sounds present; Soft, Nontender, Nondistended. No hepatomegaly  EXTREMITIES:  no pitting bilaterally  NERVOUS SYSTEM:  Alert & Oriented X3, speech clear. No focal motor or sensory deficits  MSK: FROM all 4 extremities, full and equal strength  SKIN: No rashes or lesions

## 2022-12-20 NOTE — DISCHARGE NOTE PROVIDER - PROVIDER TOKENS
PROVIDER:[TOKEN:[[id number]],FOLLOWUP:[1 week]],PROVIDER:[TOKEN:[[id number]],FOLLOWUP:[2 weeks]]

## 2022-12-20 NOTE — DISCHARGE NOTE NURSING/CASE MANAGEMENT/SOCIAL WORK - NSDCPEFALRISK_GEN_ALL_CORE
For information on Fall & Injury Prevention, visit: [email or web address] OR  [email or web address] OR  https://www.cdc.gov/steadi/patient.html

## 2022-12-20 NOTE — PROGRESS NOTE ADULT - ASSESSMENT
27F presented with RLQ abd pain     Intractable RLQ Pain 2/2 to 2mm Right UVJ Calculus w/ Mild Hydronephrosis   - CT a/p: A 2 mm right UVJ calculus with mild hydronephrosis. There are subcentimeter right intrarenal calculi noted.  - s/p Ceftriaxone in the ER; Start 2G Ceftriaxone daily   - Strain urine   - Pain control & Antiemetics  - s/p 2L of NS in the ER, continue 125 ml/hr   - Continue Flomax   - NPO, advance diet as tolerated   - Urology consult - Dr. Elias     Bibasilar ground-glass opacities, likely viral bronchitis   - Tessalon, albuterol, Mucinex   - s/p steroids     Hyperglycemia  - Check HbA1c     Prolonged QTc  - Repeat EKG in am     Hx of Seizures, anxiety, depression, IBS, s/p cholecystectomy  - Continue home medications     VTE Prophylaxis: ICDs    Disposition: Continue IVF and IV antibiotics, f/u Urology recs.  27F presented with RLQ abd pain     Intractable RLQ Pain 2/2 to 2mm Right UVJ Calculus w/ Mild Hydronephrosis   - CT a/p: A 2 mm right UVJ calculus with mild hydronephrosis. There are subcentimeter right intrarenal calculi noted.  - s/p Ceftriaxone in the ER; Start 2G Ceftriaxone daily   - Strain urine; send stone for analysis   - Pain control & Antiemetics  - s/p 2L of NS in the ER, continue 75 ml/hr   - Continue Flomax   - Diet advanced to regular  - Urology consult - Dr. Elias, pt to follow up outpatient     Bibasilar ground-glass opacities, likely viral bronchitis   - Tessalon, albuterol, Mucinex   - s/p steroids     Hyperglycemia  - Check HbA1c     Prolonged QTc  - Repeat EKG in am     Hx of Seizures, anxiety, depression, IBS, s/p cholecystectomy  - Continue home medications     VTE Prophylaxis: ICDs    Disposition: Continue IVF and IV antibiotics, f/u Urology recs. DC home later today.

## 2022-12-20 NOTE — H&P ADULT - ASSESSMENT
28 y/o F presented with RLQ abd pain     1. Intractable RLQ pain secondary to 2 mm right UVJ calculus with mild hydronephrosis   - Admit to med/surg   - c/w Flomax   - s/p Ceftriaxone in the ER; pt afebrile, no WBC, no fevers -> monitor off additional abx (ordered procalcitonin)   - Strain urine   - Pain control: Tylenol, Toradol, Diladuid   - Zofran for nausea   - s/p 2L of NS in the ER, c/w 125 ml/hr overnight   - NPO, advance diet as tolerated   - Urology consult - Dr. Elias     2. Bibasilar groundglass opacities likely viral bronchitis   - Tessalon, albuterol, mucinex   - s/p steroids     3. Hyperglycemia  - Glucose 175, monitor   - Ordered HbA1c     4. History of seizures, anxiety, depression, IBS, s/p cholecystectomy  - c/w home medications; verified with pt at the bedside     DVT ppx: SCDs, encourage ambulation

## 2022-12-20 NOTE — H&P ADULT - NSHPREVIEWOFSYSTEMS_GEN_ALL_CORE
Constitutional: negative for fatigue, negative for fever, negative for chills, negative for decreased appetite.  Skin: negative for rashes, negative for open wounds, negative for jaundice.   Eyes: negative for blurry vision, negative for double vision.   Ears, nose, throat: negative for ear pain, negative for nasal congestion, negative for sore throat, negative for lymph node swelling.   Cardiovascular: negative for chest pain, negative for palpitations, negative for lower extremity swelling.   Respiratory: negative for shortness of breath, negative for wheezing, negative for cough.   Gastrointestinal: positive for abdominal pain, positive for nausea, negative for vomiting, negative for diarrhea, negative for constipation, negative for blood in the stool, negative for black tarry stools.   Genitourinary: negative for burning on urination, negative for urinary urgency or frequency, negative for blood in the urine.   Endocrine: negative for cold intolerance, negative for heat intolerance, negative for increased thirst.   Hematologic: negative for easy bruising or bleeding.   Musculoskeletal: negative for muscle/joint pain, negative for decreased range of motion.   Neurological: negative for dizziness, negative for headaches, negative for loss of consciousness, negative for motor weakness, negative for sensory deficits.   Psychiatric: negative for depression, negative for anxiety.

## 2022-12-20 NOTE — PATIENT PROFILE ADULT - FALL HARM RISK - UNIVERSAL INTERVENTIONS
Bed in lowest position, wheels locked, appropriate side rails in place/Call bell, personal items and telephone in reach/Instruct patient to call for assistance before getting out of bed or chair/Non-slip footwear when patient is out of bed/[place] to call system/Physically safe environment - no spills, clutter or unnecessary equipment/Purposeful Proactive Rounding/Room/bathroom lighting operational, light cord in reach

## 2022-12-20 NOTE — H&P ADULT - NSHPPHYSICALEXAM_GEN_ALL_CORE
ICU Vital Signs Last 24 Hrs  T(C): 37.2 (20 Dec 2022 01:35), Max: 37.2 (19 Dec 2022 08:41)  T(F): 98.9 (20 Dec 2022 01:35), Max: 98.9 (19 Dec 2022 08:41)  HR: 86 (20 Dec 2022 01:35) (83 - 98)  BP: 110/61 (20 Dec 2022 01:35) (110/61 - 161/94)  BP(mean): 83 (19 Dec 2022 23:23) (83 - 111)  RR: 16 (20 Dec 2022 01:35) (16 - 18)  SpO2: 98% (20 Dec 2022 01:35) (98% - 100%)    O2 Parameters below as of 20 Dec 2022 01:35  Patient On (Oxygen Delivery Method): room air    General: Awake and alert, cooperative with exam. No acute distress.   Skin: Warm, dry, and pink.   Eyes: Pupils equal and reactive to light. Extraocular eye movements intact. No conjunctival injection, discharge, or scleral icterus.   HEENT: Atraumatic, normocephalic. Moist mucus membranes.   Cardiology: Normal S1, S2. No murmurs, rubs, or gallops. Regular rate and rhythm.   Respiratory: Lungs clear to ascultation bilaterally. Good air exchange. No wheezes, rales, or rhonchi. Normal chest expansion.   Gastrointestinal: Positive bowel sounds. Soft, non-distended. No guarding, rigidity, or rebound tenderness. No hepatosplenomegaly. RLQ abd tenderness. No CVA tenderness.   Musculoskeletal: 5/5 motor strength in all extremities. Normal range of motion.   Extremities: No peripheral edema bilaterally. Dorsalis pedis pulses 2+ bilaterally.   Neurological: A+Ox3 (person, place, and time). Cranial nerves 2-12 intact. Normal speech. No facial droop. No focal neurological deficits.   Psychiatric: Normal affect. Normal mood.

## 2022-12-20 NOTE — DISCHARGE NOTE PROVIDER - CARE PROVIDER_API CALL
Ladinsky, Michael A ()  Family Medicine; Geriatric Medicine  [address], [address]  [place] [address]  Phone: [phone]  Fax: [phone]  Follow Up Time: 1 week    Jeremy Elias)  Urology  [address] [place] [address], [address] [place] [address]  Phone: [phone]  Fax: [phone]  Follow Up Time: 2 weeks

## 2022-12-20 NOTE — H&P ADULT - NSICDXPASTMEDICALHX_GEN_ALL_CORE_FT
PAST MEDICAL HISTORY:  Anxiety     Depression     Focal seizures     IBS (irritable bowel syndrome)

## 2022-12-20 NOTE — PROGRESS NOTE ADULT - SUBJECTIVE AND OBJECTIVE BOX
CHIEF COMPLAINT:    HPI: (Per H&P) 27F w/ PMHx of seizures, anxiety, depression, IBS, s/p cholecystectomy presented with RLQ abd pain. Pt was seen in the ER yesterday for 1 week of cough, congestion, sore throat, runny nose. She was given an albuterol inhaler and 1 dose of steroids. Her cough did improve with the steroids. States that all of a sudden yesterday she started having 10/10 RLQ abd pain with radiation to the back. CT abd/pelvis: A 2 mm right UVJ calculus with mild hydronephrosis. There are subcentimeter right intrarenal calculi noted. Bibasilar ground-glass opacities of uncertain etiology. Pt was given Ceftriaxone, 2L of NS, Tylenol, Dilaudid, Zofran, Flomax. She is being admitted to med/surg for further management.     Interval History:    REVIEW OF SYSTEMS: All other review of systems is negative unless indicated above.    PHYSICAL EXAM:  Vital Signs Last 24 Hrs: all reviewed   T(C): 37.2 (20 Dec 2022 01:35), Max: 37.2 (20 Dec 2022 01:35)  T(F): 98.9 (20 Dec 2022 01:35), Max: 98.9 (20 Dec 2022 01:35)  HR: 90 (20 Dec 2022 08:24) (83 - 96)  BP: 99/55 (20 Dec 2022 08:24) (99/55 - 161/94)  BP(mean): 65 (20 Dec 2022 08:24) (65 - 111)  RR: 16 (20 Dec 2022 01:35) (16 - 18)  SpO2: 98% (20 Dec 2022 01:35) (98% - 100%)  Parameters below as of 20 Dec 2022 01:35  Patient On (Oxygen Delivery Method): room air    Constitutional: NAD, awake and alert  HEENT: EOMI  Neck: Soft and supple,  No JVD  Respiratory: Breath sounds are clear bilaterally, No wheezing, rales or rhonchi  Cardiovascular: S1 and S2, regular rate and rhythm, no Murmurs  Gastrointestinal: Bowel Sounds present, soft, nontender, nondistended  Extremities: No peripheral edema  Vascular: 2+ peripheral pulses  Neurological: A/O x 3, no focal deficits  Musculoskeletal: 5/5 strength b/l upper and lower extremities  Skin: No rashes    LABS: All Labs Reviewed:                        11.0   10.47 )-----------( 264      ( 20 Dec 2022 06:30 )             31.8     12-20    140  |  107  |  10  ----------------------------<  102<H>  3.8   |  26  |  0.76    Ca    8.4<L>      20 Dec 2022 06:30    TPro  8.5<H>  /  Alb  3.9  /  TBili  0.5  /  DBili  x   /  AST  13<L>  /  ALT  22  /  AlkPhos  95  12-19  Lipase, Serum (12..22 @ 16:58) 49 U/L   Urinalysis Basic - ( 19 Dec 2022 20:24 )  Color: Yellow / Appearance: Clear / S.005 / pH: x  Gluc: x / Ketone: Negative  / Bili: Negative / Urobili: Negative   Blood: x / Protein: Negative / Nitrite: Negative   Leuk Esterase: Trace / RBC: 3-5 /HPF / WBC 3-5 /HPF   Sq Epi: x / Non Sq Epi: Few / Bacteria: Occasional    MICRO:    Blood and Urine Cultures Pending     RADIOLOGY/EKG: all reviewed       MEDICATIONS: CHIEF COMPLAINT:    HPI: (Per H&P) 27F w/ PMHx of seizures, anxiety, depression, IBS, s/p cholecystectomy presented with RLQ abd pain. Pt was seen in the ER yesterday for 1 week of cough, congestion, sore throat, runny nose. She was given an albuterol inhaler and 1 dose of steroids. Her cough did improve with the steroids. States that all of a sudden yesterday she started having 10/10 RLQ abd pain with radiation to the back. CT abd/pelvis: A 2 mm right UVJ calculus with mild hydronephrosis. There are subcentimeter right intrarenal calculi noted. Bibasilar ground-glass opacities of uncertain etiology. Pt was given Ceftriaxone, 2L of NS, Tylenol, Dilaudid, Zofran, Flomax. She is being admitted to med/surg for further management.     Interval History:    REVIEW OF SYSTEMS: All other review of systems is negative unless indicated above.    PHYSICAL EXAM:  Vital Signs Last 24 Hrs: all reviewed   T(C): 37.2 (20 Dec 2022 01:35), Max: 37.2 (20 Dec 2022 01:35)  T(F): 98.9 (20 Dec 2022 01:35), Max: 98.9 (20 Dec 2022 01:35)  HR: 90 (20 Dec 2022 08:24) (83 - 96)  BP: 99/55 (20 Dec 2022 08:24) (99/55 - 161/94)  BP(mean): 65 (20 Dec 2022 08:24) (65 - 111)  RR: 16 (20 Dec 2022 01:35) (16 - 18)  SpO2: 98% (20 Dec 2022 01:35) (98% - 100%)  Parameters below as of 20 Dec 2022 01:35  Patient On (Oxygen Delivery Method): room air    Constitutional: NAD, awake and alert  HEENT: EOMI  Neck: Soft and supple,  No JVD  Respiratory: Breath sounds are clear bilaterally, No wheezing, rales or rhonchi  Cardiovascular: S1 and S2, regular rate and rhythm, no Murmurs  Gastrointestinal: Bowel Sounds present, soft, nontender, nondistended  Extremities: No peripheral edema  Vascular: 2+ peripheral pulses  Neurological: A/O x 3, no focal deficits  Musculoskeletal: 5/5 strength b/l upper and lower extremities  Skin: No rashes    LABS: All Labs Reviewed:                        11.0   10.47 )-----------( 264      ( 20 Dec 2022 06:30 )             31.8     12-20    140  |  107  |  10  ----------------------------<  102<H>  3.8   |  26  |  0.76    Ca    8.4<L>      20 Dec 2022 06:30    TPro  8.5<H>  /  Alb  3.9  /  TBili  0.5  /  DBili  x   /  AST  13<L>  /  ALT  22  /  AlkPhos  95  12-19  Lipase, Serum (22 @ 16:58) 49 U/L   Urinalysis Basic - ( 19 Dec 2022 20:24 )  Color: Yellow / Appearance: Clear / S.005 / pH: x  Gluc: x / Ketone: Negative  / Bili: Negative / Urobili: Negative   Blood: x / Protein: Negative / Nitrite: Negative   Leuk Esterase: Trace / RBC: 3-5 /HPF / WBC 3-5 /HPF   Sq Epi: x / Non Sq Epi: Few / Bacteria: Occasional    MICRO:    Blood and Urine Cultures Pending     RADIOLOGY/EKG: all reviewed     12 Lead ECG (22 @ 08:45)   Ventricular Rate 98 BPM  Atrial Rate 98 BPM  P-R Interval 152 ms  QRS Duration 84 ms  Q-T Interval 382 ms  QTC Calculation(Bazett) 487 ms  P Axis 65 degrees  R Axis 47 degrees  T Axis 64 degrees  Normal sinus rhythm  Cannot rule out Anterior infarct , age undetermined  Abnormal ECG  When compared with ECG of 14-SEP-2020 12:23, No significant change was found    Xray Chest 2 Views PA/Lat (22 @ 11:08)   FINDINGS: The lungs are clear. The cardiomediastinal silhouette is normal. The visualized osseous structures are unremarkable.  IMPRESSION: Clear lungs.    CT Abdomen and Pelvis w/ IV Cont (22 @ 18:57)   FINDINGS:  LOWER CHEST: Bibasilar groundglass opacities of uncertain etiology.  LIVER: Within normal limits.  BILE DUCTS: Mild biliary dilatation.  GALLBLADDER: Status post cholecystectomy.  SPLEEN: Within normal limits.  PANCREAS: Within normal limits.  ADRENALS: Within normal limits.  KIDNEYS/URETERS: There is a 2 mm right UVJ calculus with mild hydronephrosis. There are subcentimeter right intrarenal calculi noted.  BLADDER: Within normal limits.  REPRODUCTIVE ORGANS: Within normal limits.  BOWEL: No bowel obstruction. The appendix is normal.  PERITONEUM: No ascites.  VESSELS:  Within normal limits.  RETROPERITONEUM/LYMPH NODES: No lymphadenopathy.  ABDOMINAL WALL: Within normal limits.  BONES: Within normal limits.  IMPRESSION: A 2 mm right UVJ calculus with mild hydronephrosis. There are subcentimeter right intrarenal calculi noted.  Bibasilar groundglass opacities of uncertain etiology.    US Pelvis Complete (US Pelvis Complete .) (22 @ 19:13)   FINDINGS:  Uterus: 7.2 cm x 3.3 cm x 5.1 cm. Within normal limits.  Endometrium: 9 mm. Within normal limits.  Right ovary: 3.7 cm x 2.2 cm x 3.5cm. Within normal limits. Normal arterial and venous waveforms.  Left ovary: 2.2 cm x 1.6 cm x 2.0 cm. Within normal limits. Normal arterial and venous waveforms.  Fluid: None.  IMPRESSION:  Normal pelvic sonogram. No evidence of ovarian torsion.    MEDICATIONS:  MEDICATIONS  (STANDING):  cefTRIAXone Injectable. 2000 milliGRAM(s) IV Push every 24 hours  ergocalciferol 02856 Unit(s) Oral <User Schedule>  folic acid 1 milliGRAM(s) Oral daily  guaiFENesin ER 1200 milliGRAM(s) Oral every 12 hours  HYDROmorphone  Injectable 0.5 milliGRAM(s) IV Push once  influenza   Vaccine 0.5 milliLiter(s) IntraMuscular once  lidocaine   4% Patch 1 Patch Transdermal daily  naloxone Injectable 0.4 milliGRAM(s) IV Push once  polyethylene glycol 3350 17 Gram(s) Oral daily  prenatal multivitamin 1 Tablet(s) Oral daily  senna 2 Tablet(s) Oral at bedtime  sodium chloride 0.9%. 1000 milliLiter(s) (125 mL/Hr) IV Continuous <Continuous>  tamsulosin 0.4 milliGRAM(s) Oral at bedtime    MEDICATIONS  (PRN):  acetaminophen     Tablet .. 650 milliGRAM(s) Oral every 6 hours PRN Temp greater or equal to 38C (100.4F), Mild Pain (1 - 3)  albuterol    90 MICROgram(s) HFA Inhaler 2 Puff(s) Inhalation every 6 hours PRN Bronchospasm  aluminum hydroxide/magnesium hydroxide/simethicone Suspension 30 milliLiter(s) Oral every 4 hours PRN Dyspepsia  benzonatate 100 milliGRAM(s) Oral three times a day PRN Cough  bisacodyl 5 milliGRAM(s) Oral daily PRN Constipation  HYDROmorphone  Injectable 0.25 milliGRAM(s) IV Push every 4 hours PRN Moderate Pain (4 - 6)  HYDROmorphone  Injectable 0.5 milliGRAM(s) IV Push every 4 hours PRN Severe Pain (7 - 10)  melatonin 3 milliGRAM(s) Oral at bedtime PRN Insomnia  ondansetron Injectable 4 milliGRAM(s) IV Push every 8 hours PRN Nausea and/or Vomiting  ondansetron Injectable 4 milliGRAM(s) IV Push once PRN Nausea and/or Vomiting   CHIEF COMPLAINT: cough, nasal congestion     HPI: (Per H&P) 27F w/ PMHx of seizures, anxiety, depression, IBS, s/p cholecystectomy presented with RLQ abd pain. Pt was seen in the ER yesterday for 1 week of cough, congestion, sore throat, runny nose. She was given an albuterol inhaler and 1 dose of steroids. Her cough did improve with the steroids. States that all of a sudden yesterday she started having 10/10 RLQ abd pain with radiation to the back. CT abd/pelvis: A 2 mm right UVJ calculus with mild hydronephrosis. There are subcentimeter right intrarenal calculi noted. Bibasilar ground-glass opacities of uncertain etiology. Pt was given Ceftriaxone, 2L of NS, Tylenol, Dilaudid, Zofran, Flomax. She is being admitted to med/surg for further management.     Interval History:  pt seen and examined at bedside, she passed her renal stone overnight, she feels much improved, no longer having RLQ pain, now with some mild pelvic pressure, no dysuria or hematuria. Still with hacking cough, nasal congestion and runny nose. Denies HA, lightheadedness, dizziness, CP, palps, sob, n/v/d, fever or chills.      REVIEW OF SYSTEMS: All other review of systems is negative unless indicated above.    PHYSICAL EXAM:  Vital Signs Last 24 Hrs: all reviewed   T(C): 37.2 (20 Dec 2022 01:35), Max: 37.2 (20 Dec 2022 01:35)  T(F): 98.9 (20 Dec 2022 01:35), Max: 98.9 (20 Dec 2022 01:35)  HR: 90 (20 Dec 2022 08:24) (83 - 96)  BP: 99/55 (20 Dec 2022 08:24) (99/55 - 161/94)  BP(mean): 65 (20 Dec 2022 08:24) (65 - 111)  RR: 16 (20 Dec 2022 01:35) (16 - 18)  SpO2: 98% (20 Dec 2022 01:35) (98% - 100%)  Parameters below as of 20 Dec 2022 01:35  Patient On (Oxygen Delivery Method): room air    Constitutional: NAD, awake and alert  HEENT: EOMI, MMM  Neck: Soft and supple,  No JVD  Respiratory: Breath sounds are clear bilaterally, No wheezing, rales or rhonchi  Cardiovascular: S1 and S2, regular rate and rhythm, no Murmurs  Gastrointestinal: Bowel Sounds present, soft, nontender, nondistended  Extremities: No peripheral edema  Vascular: 2+ peripheral pulses  Neurological: A/O x 3, no focal deficits  Musculoskeletal: 5/5 strength b/l upper and lower extremities  Skin: No rashes    LABS: All Labs Reviewed:                        11.0   10.47 )-----------( 264      ( 20 Dec 2022 06:30 )             31.8     12-20    140  |  107  |  10  ----------------------------<  102<H>  3.8   |  26  |  0.76    Ca    8.4<L>      20 Dec 2022 06:30    TPro  8.5<H>  /  Alb  3.9  /  TBili  0.5  /  DBili  x   /  AST  13<L>  /  ALT  22  /  AlkPhos  95  12-19  Lipase, Serum (22 @ 16:58) 49 U/L   Urinalysis Basic - ( 19 Dec 2022 20:24 )  Color: Yellow / Appearance: Clear / S.005 / pH: x  Gluc: x / Ketone: Negative  / Bili: Negative / Urobili: Negative   Blood: x / Protein: Negative / Nitrite: Negative   Leuk Esterase: Trace / RBC: 3-5 /HPF / WBC 3-5 /HPF   Sq Epi: x / Non Sq Epi: Few / Bacteria: Occasional    MICRO:    Blood and Urine Cultures Pending     RADIOLOGY/EKG: all reviewed     12 Lead ECG (22 @ 08:45)   Ventricular Rate 98 BPM  Atrial Rate 98 BPM  P-R Interval 152 ms  QRS Duration 84 ms  Q-T Interval 382 ms  QTC Calculation(Bazett) 487 ms  P Axis 65 degrees  R Axis 47 degrees  T Axis 64 degrees  Normal sinus rhythm  Cannot rule out Anterior infarct , age undetermined  Abnormal ECG  When compared with ECG of 14-SEP-2020 12:23, No significant change was found    Xray Chest 2 Views PA/Lat (22 @ 11:08)   FINDINGS: The lungs are clear. The cardiomediastinal silhouette is normal. The visualized osseous structures are unremarkable.  IMPRESSION: Clear lungs.    CT Abdomen and Pelvis w/ IV Cont (22 @ 18:57)   FINDINGS:  LOWER CHEST: Bibasilar groundglass opacities of uncertain etiology.  LIVER: Within normal limits.  BILE DUCTS: Mild biliary dilatation.  GALLBLADDER: Status post cholecystectomy.  SPLEEN: Within normal limits.  PANCREAS: Within normal limits.  ADRENALS: Within normal limits.  KIDNEYS/URETERS: There is a 2 mm right UVJ calculus with mild hydronephrosis. There are subcentimeter right intrarenal calculi noted.  BLADDER: Within normal limits.  REPRODUCTIVE ORGANS: Within normal limits.  BOWEL: No bowel obstruction. The appendix is normal.  PERITONEUM: No ascites.  VESSELS:  Within normal limits.  RETROPERITONEUM/LYMPH NODES: No lymphadenopathy.  ABDOMINAL WALL: Within normal limits.  BONES: Within normal limits.  IMPRESSION: A 2 mm right UVJ calculus with mild hydronephrosis. There are subcentimeter right intrarenal calculi noted.  Bibasilar groundglass opacities of uncertain etiology.    US Pelvis Complete (US Pelvis Complete .) (22 @ 19:13)   FINDINGS:  Uterus: 7.2 cm x 3.3 cm x 5.1 cm. Within normal limits.  Endometrium: 9 mm. Within normal limits.  Right ovary: 3.7 cm x 2.2 cm x 3.5cm. Within normal limits. Normal arterial and venous waveforms.  Left ovary: 2.2 cm x 1.6 cm x 2.0 cm. Within normal limits. Normal arterial and venous waveforms.  Fluid: None.  IMPRESSION:  Normal pelvic sonogram. No evidence of ovarian torsion.    MEDICATIONS:  MEDICATIONS  (STANDING):  cefTRIAXone Injectable. 2000 milliGRAM(s) IV Push every 24 hours  ergocalciferol 20822 Unit(s) Oral <User Schedule>  folic acid 1 milliGRAM(s) Oral daily  guaiFENesin ER 1200 milliGRAM(s) Oral every 12 hours  HYDROmorphone  Injectable 0.5 milliGRAM(s) IV Push once  influenza   Vaccine 0.5 milliLiter(s) IntraMuscular once  lidocaine   4% Patch 1 Patch Transdermal daily  naloxone Injectable 0.4 milliGRAM(s) IV Push once  polyethylene glycol 3350 17 Gram(s) Oral daily  prenatal multivitamin 1 Tablet(s) Oral daily  senna 2 Tablet(s) Oral at bedtime  sodium chloride 0.9%. 1000 milliLiter(s) (125 mL/Hr) IV Continuous <Continuous>  tamsulosin 0.4 milliGRAM(s) Oral at bedtime    MEDICATIONS  (PRN):  acetaminophen     Tablet .. 650 milliGRAM(s) Oral every 6 hours PRN Temp greater or equal to 38C (100.4F), Mild Pain (1 - 3)  albuterol    90 MICROgram(s) HFA Inhaler 2 Puff(s) Inhalation every 6 hours PRN Bronchospasm  aluminum hydroxide/magnesium hydroxide/simethicone Suspension 30 milliLiter(s) Oral every 4 hours PRN Dyspepsia  benzonatate 100 milliGRAM(s) Oral three times a day PRN Cough  bisacodyl 5 milliGRAM(s) Oral daily PRN Constipation  HYDROmorphone  Injectable 0.25 milliGRAM(s) IV Push every 4 hours PRN Moderate Pain (4 - 6)  HYDROmorphone  Injectable 0.5 milliGRAM(s) IV Push every 4 hours PRN Severe Pain (7 - 10)  melatonin 3 milliGRAM(s) Oral at bedtime PRN Insomnia  ondansetron Injectable 4 milliGRAM(s) IV Push every 8 hours PRN Nausea and/or Vomiting  ondansetron Injectable 4 milliGRAM(s) IV Push once PRN Nausea and/or Vomiting

## 2022-12-20 NOTE — H&P ADULT - HISTORY OF PRESENT ILLNESS
28 y/o F with PMH seizures, anxiety, depression, IBS, s/p cholecystectomy presented with RLQ abd pain. Pt was seen in the ER yesterday for 1 week of cough, congestion, sore throat, runny nose. She was given an albuterol inhaler and 1 dose of steroids. Her cough did improve with the steroids. States that all of a sudden yesterday she started having 10/10 RLQ abd pain with radiation to the back. Denies fevers, chills, chest pain, SOB, abdominal pain, N/V, diarrhea/constipation. Has been trying to get pregnant, only taking prenatal vitamins at this time.     ER course: VSS. Labs: neutrophils 88.6%, glucose 175. UA: trace leukocyte esterase, small blood, RBC 3-5, occasional bacteria. COVID, influenza A/B, RSV negative. EKG: NSR with HR 98 bpm,  ms, normal intervals, no ST segment changes (personally reviewed).     Imaging:   - CT abd/pelvis: A 2 mm right UVJ calculus with mild hydronephrosis. There are subcentimeter right intrarenal calculi noted. Bibasilar groundglass opacities of uncertain etiology.  - Pelvic US: Normal pelvic sonogram. No evidence of ovarian torsion.  - CXR: no consolidation, no effusion, no pneumothorax (personally reviewed).     Pt was given Ceftriaxone, 2L of NS, tylenol, dilaudid, zofran, flomax. She is being admitted to med/surg for further management.

## 2022-12-20 NOTE — PATIENT PROFILE ADULT - VISION (WITH CORRECTIVE LENSES IF THE PATIENT USUALLY WEARS THEM):
Home wears glasses/Normal vision: sees adequately in most situations; can see medication labels, newsprint

## 2022-12-20 NOTE — CONSULT NOTE ADULT - ASSESSMENT
28 yo female with PMH as above admitted for RLQ pain found to have a 2 mm right UVJ stone. Pt passed the stone and is no longer symptomatic.  Recommend  - Calculus analysis  - Pain control  - Flomax  - F/U urine c/s and treat accordingly  - Follow up outpatient for further management / other renal stones    Case discussed with Dr. Elias

## 2022-12-20 NOTE — DISCHARGE NOTE NURSING/CASE MANAGEMENT/SOCIAL WORK - PATIENT PORTAL LINK FT
You can access the FollowMyHealth Patient Portal offered by [facility] by registering at the following website: [email or web address]. By joining [facility]’s FollowMyHealth portal, you will also be able to view your health information using other applications (apps) compatible with our system.

## 2022-12-20 NOTE — DISCHARGE NOTE PROVIDER - HOSPITAL COURSE
CHIEF COMPLAINT: cough, nasal congestion     HPI: (Per H&P) 27F w/ PMHx of seizures, anxiety, depression, IBS, s/p cholecystectomy presented with RLQ abd pain. Pt was seen in the ER yesterday for 1 week of cough, congestion, sore throat, runny nose. She was given an albuterol inhaler and 1 dose of steroids. Her cough did improve with the steroids. States that all of a sudden yesterday she started having 10/10 RLQ abd pain with radiation to the back. CT abd/pelvis: A 2 mm right UVJ calculus with mild hydronephrosis. There are subcentimeter right intrarenal calculi noted. Bibasilar ground-glass opacities of uncertain etiology. Pt was given Ceftriaxone, 2L of NS, Tylenol, Dilaudid, Zofran, Flomax. She is being admitted to med/surg for further management.     Interval History: 12/20 pt seen and examined at bedside, she passed her renal stone overnight, she feels much improved, no longer having RLQ pain, now with some mild pelvic pressure, no dysuria or hematuria. Still with hacking cough, nasal congestion and runny nose. Denies HA, lightheadedness, dizziness, CP, palps, sob, n/v/d, fever or chills.      REVIEW OF SYSTEMS: All other review of systems is negative unless indicated above.    PHYSICAL EXAM:  Vital Signs Last 24 Hrs: all reviewed   T(C): 37.2 (20 Dec 2022 01:35), Max: 37.2 (20 Dec 2022 01:35)  T(F): 98.9 (20 Dec 2022 01:35), Max: 98.9 (20 Dec 2022 01:35)  HR: 90 (20 Dec 2022 08:24) (83 - 96)  BP: 99/55 (20 Dec 2022 08:24) (99/55 - 161/94)  BP(mean): 65 (20 Dec 2022 08:24) (65 - 111)  RR: 16 (20 Dec 2022 01:35) (16 - 18)  SpO2: 98% (20 Dec 2022 01:35) (98% - 100%)  Parameters below as of 20 Dec 2022 01:35  Patient On (Oxygen Delivery Method): room air    Constitutional: NAD, awake and alert  HEENT: EOMI, MMM  Neck: Soft and supple,  No JVD  Respiratory: Breath sounds are clear bilaterally, No wheezing, rales or rhonchi  Cardiovascular: S1 and S2, regular rate and rhythm, no Murmurs  Gastrointestinal: Bowel Sounds present, soft, nontender, nondistended  Extremities: No peripheral edema  Vascular: 2+ peripheral pulses  Neurological: A/O x 3, no focal deficits  Musculoskeletal: 5/5 strength b/l upper and lower extremities  Skin: No rashes    IMAGING:  CT Abdomen and Pelvis w/ IV Cont (12.19.22 @ 18:57)   FINDINGS:  LOWER CHEST: Bibasilar groundglass opacities of uncertain etiology.  LIVER: Within normal limits.  BILE DUCTS: Mild biliary dilatation.  GALLBLADDER: Status post cholecystectomy.  SPLEEN: Within normal limits.  PANCREAS: Within normal limits.  ADRENALS: Within normal limits.  KIDNEYS/URETERS: There is a 2 mm right UVJ calculus with mild hydronephrosis. There are subcentimeter right intrarenal calculi noted.  BLADDER: Within normal limits.  REPRODUCTIVE ORGANS: Within normal limits.  BOWEL: No bowel obstruction. The appendix is normal.  PERITONEUM: No ascites.  VESSELS:  Within normal limits.  RETROPERITONEUM/LYMPH NODES: No lymphadenopathy.  ABDOMINAL WALL: Within normal limits.  BONES: Within normal limits.  IMPRESSION: A 2 mm right UVJ calculus with mild hydronephrosis. There are subcentimeter right intrarenal calculi noted.  Bibasilar groundglass opacities of uncertain etiology.    HOSPITAL COURSE:   Intractable RLQ Pain 2/2 to 2mm Right UVJ Calculus w/ Mild Hydronephrosis   - CT a/p: A 2 mm right UVJ calculus with mild hydronephrosis. There are subcentimeter right intrarenal calculi noted.  - s/p Ceftriaxone in the ER; Start 2G Ceftriaxone daily; On DC Ceftin 500mg PO BID x 7 days   - Strain urine; send stone for analysis   - Pain control & Antiemetics  - s/p 2L of NS in the ER, continue 75 ml/hr   - Continue Flomax   - Diet advanced to regular  - Urology consult - Dr. Elias, pt to follow up outpatient     Bibasilar ground-glass opacities, likely viral bronchitis   - Tessalon, albuterol, Mucinex   - Cont steroids     Hyperglycemia  - Check HbA1c     Prolonged QTc  - Follow up w PCP in 1 week for repeat EKG     Hx of Seizures, anxiety, depression, IBS, s/p cholecystectomy  - Continue home medications     Disposition: discharge to home in stable condition  Final diagnosis, treatment plan, and follow-up recommendations were discussed and explained to the patient. The patient was given an opportunity to ask questions concerning the diagnosis and treatment plan. The patient acknowledged understanding of the diagnosis, treatment, and follow-up recommendations. The patient was advised to seek urgent care upon discharge if worsening symptoms develop prior to scheduled follow-up. Time spent on discharge included time with the patient, and also coordinating discharge care as outlined below.     CHIEF COMPLAINT: cough, nasal congestion     HPI: (Per H&P) 27F w/ PMHx of seizures, anxiety, depression, IBS, s/p cholecystectomy presented with RLQ abd pain. Pt was seen in the ER yesterday for 1 week of cough, congestion, sore throat, runny nose. She was given an albuterol inhaler and 1 dose of steroids. Her cough did improve with the steroids. States that all of a sudden yesterday she started having 10/10 RLQ abd pain with radiation to the back. CT abd/pelvis: A 2 mm right UVJ calculus with mild hydronephrosis. There are subcentimeter right intrarenal calculi noted. Bibasilar ground-glass opacities of uncertain etiology. Pt was given Ceftriaxone, 2L of NS, Tylenol, Dilaudid, Zofran, Flomax. She is being admitted to med/surg for further management.     Interval History: 12/20 pt seen and examined at bedside, she passed her renal stone overnight, she feels much improved, no longer having RLQ pain, now with some mild pelvic pressure, no dysuria or hematuria. Still with hacking cough, nasal congestion and runny nose. Denies HA, lightheadedness, dizziness, CP, palps, sob, n/v/d, fever or chills.      REVIEW OF SYSTEMS: All other review of systems is negative unless indicated above.    PHYSICAL EXAM:  Vital Signs Last 24 Hrs: all reviewed   T(C): 37.2 (20 Dec 2022 01:35), Max: 37.2 (20 Dec 2022 01:35)  T(F): 98.9 (20 Dec 2022 01:35), Max: 98.9 (20 Dec 2022 01:35)  HR: 90 (20 Dec 2022 08:24) (83 - 96)  BP: 99/55 (20 Dec 2022 08:24) (99/55 - 161/94)  BP(mean): 65 (20 Dec 2022 08:24) (65 - 111)  RR: 16 (20 Dec 2022 01:35) (16 - 18)  SpO2: 98% (20 Dec 2022 01:35) (98% - 100%)  Parameters below as of 20 Dec 2022 01:35  Patient On (Oxygen Delivery Method): room air  Constitutional: NAD, awake and alert  HEENT: EOMI, MMM  Neck: Soft and supple,  No JVD  Respiratory: Breath sounds are clear bilaterally, No wheezing, rales or rhonchi  Cardiovascular: S1 and S2, regular rate and rhythm, no Murmurs  Gastrointestinal: Bowel Sounds present, soft, nontender, nondistended  Extremities: No peripheral edema  Vascular: 2+ peripheral pulses  Neurological: A/O x 3, no focal deficits  Musculoskeletal: 5/5 strength b/l upper and lower extremities  Skin: No rashes    IMAGING:  CT Abdomen and Pelvis w/ IV Cont (12.19.22 @ 18:57)   FINDINGS:  LOWER CHEST: Bibasilar groundglass opacities of uncertain etiology.  LIVER: Within normal limits.  BILE DUCTS: Mild biliary dilatation.  GALLBLADDER: Status post cholecystectomy.  SPLEEN: Within normal limits.  PANCREAS: Within normal limits.  ADRENALS: Within normal limits.  KIDNEYS/URETERS: There is a 2 mm right UVJ calculus with mild hydronephrosis. There are subcentimeter right intrarenal calculi noted.  BLADDER: Within normal limits.  REPRODUCTIVE ORGANS: Within normal limits.  BOWEL: No bowel obstruction. The appendix is normal.  PERITONEUM: No ascites.  VESSELS:  Within normal limits.  RETROPERITONEUM/LYMPH NODES: No lymphadenopathy.  ABDOMINAL WALL: Within normal limits.  BONES: Within normal limits.  IMPRESSION: A 2 mm right UVJ calculus with mild hydronephrosis. There are subcentimeter right intrarenal calculi noted.  Bibasilar groundglass opacities of uncertain etiology.    HOSPITAL COURSE:   Intractable RLQ Pain 2/2 to 2mm Right UVJ Calculus w/ Mild Hydronephrosis   - CT a/p: A 2 mm right UVJ calculus with mild hydronephrosis. There are subcentimeter right intrarenal calculi noted.  - s/p Ceftriaxone in the ER; Start 2G Ceftriaxone daily; On DC Ceftin 500mg PO BID x 7 days   - Strain urine; send stone for analysis   - Pain control & Antiemetics  - s/p 2L of NS in the ER, continue 75 ml/hr   - Continue Flomax   - Diet advanced to regular  - Urology consult - Dr. Elias, pt to follow up outpatient     Bibasilar ground-glass opacities, likely viral bronchitis   - Tessalon, albuterol, Mucinex   - Cont steroids     Hyperglycemia  - Check HbA1c     Prolonged QTc  - Follow up w PCP in 1 week for repeat EKG     Hx of Seizures, anxiety, depression, IBS, s/p cholecystectomy  - Continue home medications     Disposition: discharge to home in stable condition  Final diagnosis, treatment plan, and follow-up recommendations were discussed and explained to the patient. The patient was given an opportunity to ask questions concerning the diagnosis and treatment plan. The patient acknowledged understanding of the diagnosis, treatment, and follow-up recommendations. The patient was advised to seek urgent care upon discharge if worsening symptoms develop prior to scheduled follow-up. Time spent on discharge included time with the patient, and also coordinating discharge care as outlined below.    Appreciate ACP help.  Pt seen and examined today; all labs and imaging reviewed with NP Lucina Aguirre. POC discussed.  The above documentation reviewed by me and contains my recommendations.   time spent on discharge - 40 mins

## 2022-12-20 NOTE — CONSULT NOTE ADULT - SUBJECTIVE AND OBJECTIVE BOX
HPI:  28 y/o F with PMH seizures, anxiety, depression, IBS, s/p cholecystectomy presented with RLQ abd pain. Pt was seen in the ER yesterday for 1 week of cough, congestion, sore throat, runny nose. She was given an albuterol inhaler and 1 dose of steroids. Her cough did improve with the steroids. States that all of a sudden yesterday she started having 10/10 RLQ abd pain with radiation to the back. Denies fevers, chills, chest pain, SOB, abdominal pain, N/V, diarrhea/constipation. Has been trying to get pregnant, only taking prenatal vitamins at this time.     ER course: VSS. Labs: neutrophils 88.6%, glucose 175. UA: trace leukocyte esterase, small blood, RBC 3-5, occasional bacteria. COVID, influenza A/B, RSV negative. EKG: NSR with HR 98 bpm,  ms, normal intervals, no ST segment changes (personally reviewed).     Imaging:   - CT abd/pelvis: A 2 mm right UVJ calculus with mild hydronephrosis. There are subcentimeter right intrarenal calculi noted. Bibasilar groundglass opacities of uncertain etiology.  - Pelvic US: Normal pelvic sonogram. No evidence of ovarian torsion.  - CXR: no consolidation, no effusion, no pneumothorax (personally reviewed).     Pt was given Ceftriaxone, 2L of NS, tylenol, dilaudid, zofran, flomax. She is being admitted to med/surg for further management.  (20 Dec 2022 04:41)    26 yo female with PMH as above admitted for RLQ pain found to have a 2 mm right UVJ stone. Pt seen this morning reports her pain was 10/10 and now its improving after she passed the stone in the strainer overnight. Patient reports she feels soreness and is no longer having the pain she was having. She denies any fevers, chills, n/v, flank pain.      PAST MEDICAL & SURGICAL HISTORY:  IBS (irritable bowel syndrome)      Anxiety      Depression      Focal seizures      History of cholecystectomy      H/O umbilical hernia repair          REVIEW OF SYSTEMS    All other review of systems neg, except as noted in HPI  MEDICATIONS  (STANDING):  cefTRIAXone Injectable. 2000 milliGRAM(s) IV Push every 24 hours  ergocalciferol 83914 Unit(s) Oral <User Schedule>  folic acid 1 milliGRAM(s) Oral daily  guaiFENesin ER 1200 milliGRAM(s) Oral every 12 hours  HYDROmorphone  Injectable 0.5 milliGRAM(s) IV Push once  influenza   Vaccine 0.5 milliLiter(s) IntraMuscular once  lidocaine   4% Patch 1 Patch Transdermal daily  naloxone Injectable 0.4 milliGRAM(s) IV Push once  polyethylene glycol 3350 17 Gram(s) Oral daily  prenatal multivitamin 1 Tablet(s) Oral daily  senna 2 Tablet(s) Oral at bedtime  sodium chloride 0.9%. 1000 milliLiter(s) (75 mL/Hr) IV Continuous <Continuous>  tamsulosin 0.4 milliGRAM(s) Oral at bedtime    MEDICATIONS  (PRN):  acetaminophen     Tablet .. 650 milliGRAM(s) Oral every 6 hours PRN Temp greater or equal to 38C (100.4F), Mild Pain (1 - 3)  albuterol    90 MICROgram(s) HFA Inhaler 2 Puff(s) Inhalation every 6 hours PRN Bronchospasm  aluminum hydroxide/magnesium hydroxide/simethicone Suspension 30 milliLiter(s) Oral every 4 hours PRN Dyspepsia  benzonatate 100 milliGRAM(s) Oral three times a day PRN Cough  bisacodyl 5 milliGRAM(s) Oral daily PRN Constipation  HYDROmorphone  Injectable 0.25 milliGRAM(s) IV Push every 4 hours PRN Moderate Pain (4 - 6)  HYDROmorphone  Injectable 0.5 milliGRAM(s) IV Push every 4 hours PRN Severe Pain (7 - 10)  melatonin 3 milliGRAM(s) Oral at bedtime PRN Insomnia  ondansetron Injectable 4 milliGRAM(s) IV Push every 8 hours PRN Nausea and/or Vomiting  ondansetron Injectable 4 milliGRAM(s) IV Push once PRN Nausea and/or Vomiting      Allergies    Mushrooms (Unknown)  No Known Drug Allergies    Intolerances        SOCIAL HISTORY:    FAMILY HISTORY:  Family history unknown (Father, Mother)        Vital Signs Last 24 Hrs  T(C): 36.8 (20 Dec 2022 11:57), Max: 37.2 (20 Dec 2022 01:35)  T(F): 98.3 (20 Dec 2022 11:57), Max: 98.9 (20 Dec 2022 01:35)  HR: 72 (20 Dec 2022 11:57) (72 - 96)  BP: 111/71 (20 Dec 2022 11:57) (99/55 - 126/72)  BP(mean): 65 (20 Dec 2022 08:24) (65 - 84)  RR: 18 (20 Dec 2022 11:57) (16 - 18)  SpO2: 100% (20 Dec 2022 11:57) (98% - 100%)    Parameters below as of 20 Dec 2022 01:35  Patient On (Oxygen Delivery Method): room air        PHYSICAL EXAM:  General: No distress, No anxiety  VITALS  T(C): 36.8 (22 @ 11:57), Max: 37.2 (22 @ 01:35)  HR: 72 (22 @ 11:57) (72 - 96)  BP: 111/71 (22 @ 11:57) (99/55 - 126/72)  RR: 18 (22 @ 11:57) (16 - 18)  SpO2: 100% (22 @ 11:57) (98% - 100%)            Skin     : No jaundice  HEENT: Normocephalic, no icterus , EOM full , No epistaxis  Lung    : No resp distress  Abdo:   : Soft, Non tender, No guarding, No distension   Back    : No CVAT b/l  Genitalia Male: No Mccarthy   Neuro   : A&Ox3          LABS:                        11.0   10.47 )-----------( 264      ( 20 Dec 2022 06:30 )             31.8     12-20    140  |  107  |  10  ----------------------------<  102<H>  3.8   |  26  |  0.76    Ca    8.4<L>      20 Dec 2022 06:30    TPro  8.5<H>  /  Alb  3.9  /  TBili  0.5  /  DBili  x   /  AST  13<L>  /  ALT  22  /  AlkPhos  95  12-19      Urinalysis Basic - ( 19 Dec 2022 20:24 )    Color: Yellow / Appearance: Clear / S.005 / pH: x  Gluc: x / Ketone: Negative  / Bili: Negative / Urobili: Negative   Blood: x / Protein: Negative / Nitrite: Negative   Leuk Esterase: Trace / RBC: 3-5 /HPF / WBC 3-5 /HPF   Sq Epi: x / Non Sq Epi: Few / Bacteria: Occasional        RADIOLOGY & ADDITIONAL STUDIES:    < from: CT Abdomen and Pelvis w/ IV Cont (22 @ 18:57) >  ACC: [id number] EXAM:  CT ABDOMEN AND PELVIS IC                          PROCEDURE DATE:  2022          INTERPRETATION:  CLINICAL INFORMATION: RLQ abdominal pain, appendicitis   suspected JCT    COMPARISON: 17.    CONTRAST/COMPLICATIONS:  IV Contrast: Omnipaque 350  90 cc administered   10 cc discarded  Oral Contrast: NONE  Complications: None reported at time of study completion    PROCEDURE:  CT of the Abdomen and Pelvis was performed.  Sagittal and coronal reformats were performed.    FINDINGS:    LOWER CHEST: Bibasilar groundglass opacities of uncertain etiology.    LIVER: Within normal limits.  BILE DUCTS: Mild biliary dilatation.  GALLBLADDER: Status post cholecystectomy.  SPLEEN: Within normal limits.  PANCREAS: Within normal limits.  ADRENALS: Within normal limits.  KIDNEYS/URETERS: There is a 2 mm right UVJ calculus with mild   hydronephrosis. There are subcentimeter right intrarenal calculi noted.    BLADDER: Within normal limits.  REPRODUCTIVE ORGANS: Within normal limits.    BOWEL: No bowel obstruction. The appendix is normal.  PERITONEUM: No ascites.  VESSELS:  Within normal limits.  RETROPERITONEUM/LYMPH NODES: No lymphadenopathy.  ABDOMINAL WALL: Within normal limits.  BONES: Within normal limits.    IMPRESSION: A 2 mm right UVJ calculus with mild hydronephrosis. There are   subcentimeter right intrarenal calculi noted.    Bibasilar groundglass opacities of uncertain etiology.    --- End of Report ---            JIHAN SO MD; Attending Radiologist  This document has been electronically signed. Dec 19 2022  7:16PM    < end of copied text >

## 2022-12-20 NOTE — DISCHARGE NOTE NURSING/CASE MANAGEMENT/SOCIAL WORK - NURSING SECTION COMPLETE
"  JAMEEL (generalized anxiety disorder) [id number]]                              Final result               Extra Red Top Tube[[id number]]                               Final result               Extra Green Top (Lithium...[[id number]]                      [facility] EMERGENCY DEPT     Jerry Chahal MD  11/16/21 [id number]     Patient/Caregiver provided printed discharge information.

## 2022-12-20 NOTE — DISCHARGE NOTE PROVIDER - NSDCCPCAREPLAN_GEN_ALL_CORE_FT
PRINCIPAL DISCHARGE DIAGNOSIS  Diagnosis: Kidney stone  Assessment and Plan of Treatment: Pain due to a 2mm Right UVJ Calculus that you passed  With Mild Hydronephrosis   START taking Ceftin 500mg by mouth twice daily x 7 days  Take Tylenol for pain  START taking Flomax daily at bedtime  Follow up with Dr. Elias in 2 weeks   Follow up with your PCP in one week to go over the details of your hospitalization, bring this document with you  If you develop fever, chills, vomiting, worsening pain return to the ER         SECONDARY DISCHARGE DIAGNOSES  Diagnosis: Viral bronchitis  Assessment and Plan of Treatment: Continue Dexamethasone (steroids) as directed  Continue Albuterol Inhaler for shortness of breath as needed  Continue Mucinex and Tessalon Perle as Directed    Diagnosis: Abnormal finding on EKG  Assessment and Plan of Treatment: Prolonged QTc  Follow up with your PCP in one week for a repeat EKG

## 2022-12-21 LAB
CULTURE RESULTS: SIGNIFICANT CHANGE UP
SPECIMEN SOURCE: SIGNIFICANT CHANGE UP

## 2022-12-25 LAB
CULTURE RESULTS: SIGNIFICANT CHANGE UP
CULTURE RESULTS: SIGNIFICANT CHANGE UP
SPECIMEN SOURCE: SIGNIFICANT CHANGE UP
SPECIMEN SOURCE: SIGNIFICANT CHANGE UP

## 2022-12-27 DIAGNOSIS — N13.2 HYDRONEPHROSIS WITH RENAL AND URETERAL CALCULOUS OBSTRUCTION: ICD-10-CM

## 2022-12-27 DIAGNOSIS — R73.9 HYPERGLYCEMIA, UNSPECIFIED: ICD-10-CM

## 2022-12-27 DIAGNOSIS — K58.9 IRRITABLE BOWEL SYNDROME WITHOUT DIARRHEA: ICD-10-CM

## 2022-12-27 DIAGNOSIS — G40.109 LOCALIZATION-RELATED (FOCAL) (PARTIAL) SYMPTOMATIC EPILEPSY AND EPILEPTIC SYNDROMES WITH SIMPLE PARTIAL SEIZURES, NOT INTRACTABLE, WITHOUT STATUS EPILEPTICUS: ICD-10-CM

## 2022-12-27 DIAGNOSIS — R94.31 ABNORMAL ELECTROCARDIOGRAM [ECG] [EKG]: ICD-10-CM

## 2022-12-27 DIAGNOSIS — Z91.018 ALLERGY TO OTHER FOODS: ICD-10-CM

## 2022-12-27 DIAGNOSIS — F32.A DEPRESSION, UNSPECIFIED: ICD-10-CM

## 2022-12-27 DIAGNOSIS — F41.9 ANXIETY DISORDER, UNSPECIFIED: ICD-10-CM

## 2023-01-11 ENCOUNTER — NON-APPOINTMENT (OUTPATIENT)
Age: 28
End: 2023-01-11

## 2023-02-01 ENCOUNTER — APPOINTMENT (OUTPATIENT)
Dept: NEUROLOGY | Facility: CLINIC | Age: 28
End: 2023-02-01
Payer: COMMERCIAL

## 2023-02-01 DIAGNOSIS — F44.5 CONVERSION DISORDER WITH SEIZURES OR CONVULSIONS: ICD-10-CM

## 2023-02-01 PROCEDURE — 99213 OFFICE O/P EST LOW 20 MIN: CPT | Mod: 95

## 2023-02-01 NOTE — DATA REVIEWED
[de-identified] : MRi brain 6/3/20: No evidence of MTS, no acute intracranial hemorrhage or infarct or mass. [de-identified] : EEG 5/27/20: normal\par 24 hour EEG 5/27/20-5/28/20: normal\par 48 hour video EEG 9/14/22-9/16/22:\par \par EEG Summary/Classification:\par This is a normal video EEG in the awake and asleep states.\par \par EEG Impression/Clinical Correlate:\par A normal EEG neither supports nor refutes a diagnosis of epilepsy.\par The events captured during this study do not appear to be epileptic in nature.\par \par  [de-identified] : Lamictal level 8/5/20: 3.9 (at dose of 200 mg/day)

## 2023-02-01 NOTE — DISCUSSION/SUMMARY
[FreeTextEntry1] : Ms. Hawkins is a 27-year-old woman who was previously seen in 2020 for seizure-like events.\par She had an extensive work-up at that time including multiple prolonged EEGs and MRI of the brain.\par The studies did not show any underlying risk for epilepsy.\par EEGs at both [facility] and [facility] captured events that were not epileptic in nature.\par There was also a question of medications contributing to seizures.\par \par She has not had any questionable events in 2 years and has been off anticonvulsants for over 1 year.\par \par At this time, there is no neurological contraindication to Ms. Hawkins undergoing IVF, IVF procedures with anesthesia and pregnancy.\par \par She will follow-up if needed.

## 2023-02-01 NOTE — HISTORY OF PRESENT ILLNESS
[Home] : at home, [unfilled] , at the time of the visit. [Medical Office: ([facility])___] : at the medical office located in  [Verbal consent obtained from patient] : the patient, [unfilled] [FreeTextEntry1] : Ms. Hawkins was last seen in the office August 2020.\par In September 2020 she was admitted to [facility] for 48 hour video EEG which was normal.\par Events that were captured were not epileptic in nature.\par \par She is now going through fertility treatments. \par \par She states that she has not been having any neurological issues or seizure type events over the last year.\par She thinks that the last episode was either in late 2020 or early 2021.\par She has been off lamotrigine since January 2022. She was gradually tapered off by her psychiatrist. \par She sees a therapist on a weekly basis.\par \par Over the last year she denies having any seizures since the last visit. There have been no episodes of waking up with tongue bites, urinary incontinence, or unexplained muscle soreness. 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 \par \par \par \par \par \par \par \par

## 2023-02-23 ENCOUNTER — APPOINTMENT (OUTPATIENT)
Dept: UROLOGY | Facility: CLINIC | Age: 28
End: 2023-02-23
Payer: COMMERCIAL

## 2023-02-23 ENCOUNTER — APPOINTMENT (OUTPATIENT)
Dept: RADIOLOGY | Facility: CLINIC | Age: 28
End: 2023-02-23
Payer: COMMERCIAL

## 2023-02-23 ENCOUNTER — OUTPATIENT (OUTPATIENT)
Dept: OUTPATIENT SERVICES | Facility: HOSPITAL | Age: 28
LOS: 1 days | End: 2023-02-23
Payer: COMMERCIAL

## 2023-02-23 VITALS
DIASTOLIC BLOOD PRESSURE: 71 MMHG | OXYGEN SATURATION: 100 % | HEART RATE: 70 BPM | HEIGHT: 64 IN | WEIGHT: 135 LBS | SYSTOLIC BLOOD PRESSURE: 99 MMHG | TEMPERATURE: 98 F | BODY MASS INDEX: 23.05 KG/M2 | RESPIRATION RATE: 14 BRPM

## 2023-02-23 DIAGNOSIS — N20.0 CALCULUS OF KIDNEY: ICD-10-CM

## 2023-02-23 DIAGNOSIS — Z98.890 OTHER SPECIFIED POSTPROCEDURAL STATES: Chronic | ICD-10-CM

## 2023-02-23 DIAGNOSIS — Z90.49 ACQUIRED ABSENCE OF OTHER SPECIFIED PARTS OF DIGESTIVE TRACT: Chronic | ICD-10-CM

## 2023-02-23 PROCEDURE — 99244 OFF/OP CNSLTJ NEW/EST MOD 40: CPT

## 2023-02-23 PROCEDURE — 74018 RADEX ABDOMEN 1 VIEW: CPT

## 2023-02-23 PROCEDURE — 74018 RADEX ABDOMEN 1 VIEW: CPT | Mod: 26

## 2023-02-23 NOTE — END OF VISIT
[FreeTextEntry3] : A KUB x-ray is ordered along with a cemented basic.  If the stones are lucent dissolution therapy will be tried if they are opaque then lithotripsy will be recommended.  Stone prevention was gone over in detail and she will start with increased hydration and the use of lemon lime based beverages.  If in 1 year further stone formation is noted then she will consider medical stone management and dictation

## 2023-02-23 NOTE — HISTORY OF PRESENT ILLNESS
[FreeTextEntry1] : This patient passed a 2 mm stone several months ago.  Her CAT scan shows 2 more stones in the right kidney.  She denies a history of urinary issues prior to this or any metabolic abnormalities that might of been seen.

## 2023-02-24 LAB
ANION GAP SERPL CALC-SCNC: 15 MMOL/L
BUN SERPL-MCNC: 14 MG/DL
CALCIUM SERPL-MCNC: 9.6 MG/DL
CHLORIDE SERPL-SCNC: 101 MMOL/L
CO2 SERPL-SCNC: 24 MMOL/L
CREAT SERPL-MCNC: 0.65 MG/DL
EGFR: 121 ML/MIN/1.73M2
GLUCOSE SERPL-MCNC: 70 MG/DL
POTASSIUM SERPL-SCNC: 4.6 MMOL/L
SODIUM SERPL-SCNC: 140 MMOL/L

## 2023-04-01 ENCOUNTER — INPATIENT (INPATIENT)
Facility: HOSPITAL | Age: 28
LOS: 0 days | Discharge: HOME | DRG: 661 | End: 2023-04-01
Attending: SPECIALIST | Admitting: SPECIALIST
Payer: COMMERCIAL

## 2023-04-01 ENCOUNTER — TRANSCRIPTION ENCOUNTER (OUTPATIENT)
Age: 28
End: 2023-04-01

## 2023-04-01 VITALS — HEIGHT: 64 IN | WEIGHT: 139.99 LBS

## 2023-04-01 VITALS
HEART RATE: 76 BPM | RESPIRATION RATE: 19 BRPM | SYSTOLIC BLOOD PRESSURE: 117 MMHG | OXYGEN SATURATION: 99 % | DIASTOLIC BLOOD PRESSURE: 69 MMHG | TEMPERATURE: 98 F

## 2023-04-01 DIAGNOSIS — N20.1 CALCULUS OF URETER: ICD-10-CM

## 2023-04-01 DIAGNOSIS — Z98.890 OTHER SPECIFIED POSTPROCEDURAL STATES: Chronic | ICD-10-CM

## 2023-04-01 DIAGNOSIS — Z90.49 ACQUIRED ABSENCE OF OTHER SPECIFIED PARTS OF DIGESTIVE TRACT: Chronic | ICD-10-CM

## 2023-04-01 DIAGNOSIS — N23 UNSPECIFIED RENAL COLIC: ICD-10-CM

## 2023-04-01 LAB
ALBUMIN SERPL ELPH-MCNC: 3.3 G/DL — SIGNIFICANT CHANGE UP (ref 3.3–5)
ALP SERPL-CCNC: 81 U/L — SIGNIFICANT CHANGE UP (ref 40–120)
ALT FLD-CCNC: 36 U/L — SIGNIFICANT CHANGE UP (ref 12–78)
ANION GAP SERPL CALC-SCNC: 3 MMOL/L — LOW (ref 5–17)
APPEARANCE UR: ABNORMAL
AST SERPL-CCNC: 21 U/L — SIGNIFICANT CHANGE UP (ref 15–37)
BACTERIA # UR AUTO: ABNORMAL
BASOPHILS # BLD AUTO: 0.02 K/UL — SIGNIFICANT CHANGE UP (ref 0–0.2)
BASOPHILS NFR BLD AUTO: 0.2 % — SIGNIFICANT CHANGE UP (ref 0–2)
BILIRUB SERPL-MCNC: 0.4 MG/DL — SIGNIFICANT CHANGE UP (ref 0.2–1.2)
BILIRUB UR-MCNC: NEGATIVE — SIGNIFICANT CHANGE UP
BUN SERPL-MCNC: 13 MG/DL — SIGNIFICANT CHANGE UP (ref 7–23)
CALCIUM SERPL-MCNC: 9.2 MG/DL — SIGNIFICANT CHANGE UP (ref 8.5–10.1)
CHLORIDE SERPL-SCNC: 109 MMOL/L — HIGH (ref 96–108)
CO2 SERPL-SCNC: 25 MMOL/L — SIGNIFICANT CHANGE UP (ref 22–31)
COLOR SPEC: ABNORMAL
CREAT SERPL-MCNC: 0.66 MG/DL — SIGNIFICANT CHANGE UP (ref 0.5–1.3)
DIFF PNL FLD: ABNORMAL
EGFR: 121 ML/MIN/1.73M2 — SIGNIFICANT CHANGE UP
EOSINOPHIL # BLD AUTO: 0.06 K/UL — SIGNIFICANT CHANGE UP (ref 0–0.5)
EOSINOPHIL NFR BLD AUTO: 0.5 % — SIGNIFICANT CHANGE UP (ref 0–6)
EPI CELLS # UR: SIGNIFICANT CHANGE UP
FLUAV AG NPH QL: SIGNIFICANT CHANGE UP
FLUBV AG NPH QL: SIGNIFICANT CHANGE UP
GLUCOSE SERPL-MCNC: 100 MG/DL — HIGH (ref 70–99)
GLUCOSE UR QL: NEGATIVE — SIGNIFICANT CHANGE UP
HCG SERPL-ACNC: <1 MIU/ML — SIGNIFICANT CHANGE UP
HCT VFR BLD CALC: 35.6 % — SIGNIFICANT CHANGE UP (ref 34.5–45)
HGB BLD-MCNC: 11.9 G/DL — SIGNIFICANT CHANGE UP (ref 11.5–15.5)
IMM GRANULOCYTES NFR BLD AUTO: 0.4 % — SIGNIFICANT CHANGE UP (ref 0–0.9)
KETONES UR-MCNC: ABNORMAL
LEUKOCYTE ESTERASE UR-ACNC: ABNORMAL
LIDOCAIN IGE QN: 73 U/L — SIGNIFICANT CHANGE UP (ref 73–393)
LYMPHOCYTES # BLD AUTO: 1.97 K/UL — SIGNIFICANT CHANGE UP (ref 1–3.3)
LYMPHOCYTES # BLD AUTO: 15.5 % — SIGNIFICANT CHANGE UP (ref 13–44)
MCHC RBC-ENTMCNC: 30 PG — SIGNIFICANT CHANGE UP (ref 27–34)
MCHC RBC-ENTMCNC: 33.4 GM/DL — SIGNIFICANT CHANGE UP (ref 32–36)
MCV RBC AUTO: 89.7 FL — SIGNIFICANT CHANGE UP (ref 80–100)
MONOCYTES # BLD AUTO: 0.59 K/UL — SIGNIFICANT CHANGE UP (ref 0–0.9)
MONOCYTES NFR BLD AUTO: 4.7 % — SIGNIFICANT CHANGE UP (ref 2–14)
NEUTROPHILS # BLD AUTO: 9.99 K/UL — HIGH (ref 1.8–7.4)
NEUTROPHILS NFR BLD AUTO: 78.7 % — HIGH (ref 43–77)
NITRITE UR-MCNC: NEGATIVE — SIGNIFICANT CHANGE UP
PH UR: 5 — SIGNIFICANT CHANGE UP (ref 5–8)
PLATELET # BLD AUTO: 370 K/UL — SIGNIFICANT CHANGE UP (ref 150–400)
POTASSIUM SERPL-MCNC: 4.4 MMOL/L — SIGNIFICANT CHANGE UP (ref 3.5–5.3)
POTASSIUM SERPL-SCNC: 4.4 MMOL/L — SIGNIFICANT CHANGE UP (ref 3.5–5.3)
PROT SERPL-MCNC: 7.9 GM/DL — SIGNIFICANT CHANGE UP (ref 6–8.3)
PROT UR-MCNC: 30 MG/DL
RBC # BLD: 3.97 M/UL — SIGNIFICANT CHANGE UP (ref 3.8–5.2)
RBC # FLD: 12.2 % — SIGNIFICANT CHANGE UP (ref 10.3–14.5)
RBC CASTS # UR COMP ASSIST: >50 /HPF (ref 0–4)
RSV RNA NPH QL NAA+NON-PROBE: SIGNIFICANT CHANGE UP
SARS-COV-2 RNA SPEC QL NAA+PROBE: SIGNIFICANT CHANGE UP
SODIUM SERPL-SCNC: 137 MMOL/L — SIGNIFICANT CHANGE UP (ref 135–145)
SP GR SPEC: 1.02 — SIGNIFICANT CHANGE UP (ref 1.01–1.02)
UROBILINOGEN FLD QL: NEGATIVE — SIGNIFICANT CHANGE UP
WBC # BLD: 12.68 K/UL — HIGH (ref 3.8–10.5)
WBC # FLD AUTO: 12.68 K/UL — HIGH (ref 3.8–10.5)
WBC UR QL: ABNORMAL /HPF (ref 0–5)

## 2023-04-01 PROCEDURE — 74018 RADEX ABDOMEN 1 VIEW: CPT | Mod: 26

## 2023-04-01 PROCEDURE — 76000 FLUOROSCOPY <1 HR PHYS/QHP: CPT

## 2023-04-01 PROCEDURE — 99285 EMERGENCY DEPT VISIT HI MDM: CPT

## 2023-04-01 PROCEDURE — 74176 CT ABD & PELVIS W/O CONTRAST: CPT | Mod: 26,MA

## 2023-04-01 PROCEDURE — 52332 CYSTOSCOPY AND TREATMENT: CPT | Mod: LT

## 2023-04-01 PROCEDURE — C2617: CPT

## 2023-04-01 PROCEDURE — 74018 RADEX ABDOMEN 1 VIEW: CPT

## 2023-04-01 PROCEDURE — 0241U: CPT

## 2023-04-01 PROCEDURE — C1769: CPT

## 2023-04-01 RX ORDER — ACETAMINOPHEN 500 MG
2 TABLET ORAL
Qty: 0 | Refills: 0 | DISCHARGE
Start: 2023-04-01

## 2023-04-01 RX ORDER — SODIUM CHLORIDE 9 MG/ML
1000 INJECTION INTRAMUSCULAR; INTRAVENOUS; SUBCUTANEOUS ONCE
Refills: 0 | Status: COMPLETED | OUTPATIENT
Start: 2023-04-01 | End: 2023-04-01

## 2023-04-01 RX ORDER — ERGOCALCIFEROL 1.25 MG/1
1 CAPSULE ORAL
Qty: 0 | Refills: 0 | DISCHARGE

## 2023-04-01 RX ORDER — OXYCODONE HYDROCHLORIDE 5 MG/1
1 TABLET ORAL
Qty: 0 | Refills: 0 | DISCHARGE
Start: 2023-04-01

## 2023-04-01 RX ORDER — CIPROFLOXACIN HYDROCHLORIDE 250 MG/1
250 TABLET, FILM COATED ORAL
Qty: 8 | Refills: 0 | Status: ACTIVE | COMMUNITY
Start: 2023-04-01 | End: 1900-01-01

## 2023-04-01 RX ORDER — ACETAMINOPHEN 500 MG
650 TABLET ORAL EVERY 6 HOURS
Refills: 0 | Status: DISCONTINUED | OUTPATIENT
Start: 2023-04-01 | End: 2023-04-01

## 2023-04-01 RX ORDER — FENTANYL CITRATE 50 UG/ML
25 INJECTION INTRAVENOUS
Refills: 0 | Status: DISCONTINUED | OUTPATIENT
Start: 2023-04-01 | End: 2023-04-01

## 2023-04-01 RX ORDER — OXYBUTYNIN CHLORIDE 5 MG
10 TABLET ORAL ONCE
Refills: 0 | Status: COMPLETED | OUTPATIENT
Start: 2023-04-01 | End: 2023-04-01

## 2023-04-01 RX ORDER — OXYCODONE HYDROCHLORIDE 5 MG/1
5 TABLET ORAL ONCE
Refills: 0 | Status: DISCONTINUED | OUTPATIENT
Start: 2023-04-01 | End: 2023-04-01

## 2023-04-01 RX ORDER — KETOROLAC TROMETHAMINE 30 MG/ML
30 SYRINGE (ML) INJECTION ONCE
Refills: 0 | Status: DISCONTINUED | OUTPATIENT
Start: 2023-04-01 | End: 2023-04-01

## 2023-04-01 RX ORDER — ONDANSETRON 8 MG/1
4 TABLET, FILM COATED ORAL ONCE
Refills: 0 | Status: DISCONTINUED | OUTPATIENT
Start: 2023-04-01 | End: 2023-04-01

## 2023-04-01 RX ORDER — PHENAZOPYRIDINE 200 MG/1
200 TABLET, FILM COATED ORAL
Qty: 30 | Refills: 2 | Status: ACTIVE | COMMUNITY
Start: 2023-04-01 | End: 1900-01-01

## 2023-04-01 RX ORDER — SODIUM CHLORIDE 9 MG/ML
1000 INJECTION, SOLUTION INTRAVENOUS
Refills: 0 | Status: DISCONTINUED | OUTPATIENT
Start: 2023-04-01 | End: 2023-04-01

## 2023-04-01 RX ORDER — MORPHINE SULFATE 50 MG/1
4 CAPSULE, EXTENDED RELEASE ORAL ONCE
Refills: 0 | Status: DISCONTINUED | OUTPATIENT
Start: 2023-04-01 | End: 2023-04-01

## 2023-04-01 RX ORDER — ONDANSETRON 8 MG/1
4 TABLET, FILM COATED ORAL ONCE
Refills: 0 | Status: COMPLETED | OUTPATIENT
Start: 2023-04-01 | End: 2023-04-01

## 2023-04-01 RX ORDER — PHENAZOPYRIDINE HCL 100 MG
200 TABLET ORAL ONCE
Refills: 0 | Status: COMPLETED | OUTPATIENT
Start: 2023-04-01 | End: 2023-04-01

## 2023-04-01 RX ORDER — OXYCODONE AND ACETAMINOPHEN 5; 325 MG/1; MG/1
1 TABLET ORAL EVERY 6 HOURS
Refills: 0 | Status: DISCONTINUED | OUTPATIENT
Start: 2023-04-01 | End: 2023-04-01

## 2023-04-01 RX ORDER — FENTANYL CITRATE 50 UG/ML
50 INJECTION INTRAVENOUS
Refills: 0 | Status: DISCONTINUED | OUTPATIENT
Start: 2023-04-01 | End: 2023-04-01

## 2023-04-01 RX ORDER — OXYCODONE AND ACETAMINOPHEN 5; 325 MG/1; MG/1
5-325 TABLET ORAL
Qty: 12 | Refills: 0 | Status: ACTIVE | COMMUNITY
Start: 2023-04-01 | End: 1900-01-01

## 2023-04-01 RX ADMIN — SODIUM CHLORIDE 1000 MILLILITER(S): 9 INJECTION INTRAMUSCULAR; INTRAVENOUS; SUBCUTANEOUS at 12:42

## 2023-04-01 RX ADMIN — Medication 200 MILLIGRAM(S): at 14:43

## 2023-04-01 RX ADMIN — OXYCODONE HYDROCHLORIDE 5 MILLIGRAM(S): 5 TABLET ORAL at 15:13

## 2023-04-01 RX ADMIN — OXYCODONE HYDROCHLORIDE 5 MILLIGRAM(S): 5 TABLET ORAL at 15:45

## 2023-04-01 RX ADMIN — FENTANYL CITRATE 50 MICROGRAM(S): 50 INJECTION INTRAVENOUS at 14:30

## 2023-04-01 RX ADMIN — ONDANSETRON 4 MILLIGRAM(S): 8 TABLET, FILM COATED ORAL at 10:58

## 2023-04-01 RX ADMIN — MORPHINE SULFATE 4 MILLIGRAM(S): 50 CAPSULE, EXTENDED RELEASE ORAL at 12:42

## 2023-04-01 RX ADMIN — Medication 10 MILLIGRAM(S): at 14:43

## 2023-04-01 RX ADMIN — SODIUM CHLORIDE 1000 MILLILITER(S): 9 INJECTION INTRAMUSCULAR; INTRAVENOUS; SUBCUTANEOUS at 10:58

## 2023-04-01 RX ADMIN — Medication 30 MILLIGRAM(S): at 10:57

## 2023-04-01 RX ADMIN — FENTANYL CITRATE 50 MICROGRAM(S): 50 INJECTION INTRAVENOUS at 14:17

## 2023-04-01 NOTE — ED ADULT NURSE NOTE - OBJECTIVE STATEMENT
pt presents to ED from home for R sided flank pain. hx 3 kidney stones on R side, states she hasn't passed them. c/o worsening pain. took ibprofen 1.5 hr PTA without relief. also notes egg retrieval 1 week ago. endorses nausea, vomiting today.

## 2023-04-01 NOTE — ED PROVIDER NOTE - NS ED ROS FT
Constitutional: No fever or chills  Eyes: No visual changes  HEENT: No throat pain  CV: No chest pain  Resp: No SOB no cough  GI: +nausea   : + R sided flank pain, + chloruria   MSK: No musculoskeletal pain  Skin: No rash  Neuro: No headache

## 2023-04-01 NOTE — ED PROVIDER NOTE - CONSTITUTIONAL, MLM
normal... Well appearing, awake, alert, oriented to person, place, time/situation and in mild apparent painful distress.

## 2023-04-01 NOTE — ED PROVIDER NOTE - CLINICAL SUMMARY MEDICAL DECISION MAKING FREE TEXT BOX
29 y/o F with rt sided lower, flank pain. differential dx includes but not limited to: pyelonephritis, renal colic, UTI, appendicitis. CT imaging, labs, urinalysis, reevaluate. 29 y/o F with rt sided lower abdominal, flank pain. differential dx includes but not limited to: pyelonephritis, renal colic, UTI, appendicitis. CT imaging, labs, urinalysis, reevaluate.    12:15pm- patient still with pain at this time; spoke with Dr. Elias on call- rec admit to medicine; will consult.

## 2023-04-01 NOTE — ED PROVIDER NOTE - PHYSICAL EXAMINATION
Constitutional: + mild pain distress   Eyes: PERRLA EOMI  Head: Normocephalic atraumatic  Mouth: MMM  Cardiac: regular rate   Resp: Lungs CTAB  GI: mild RLQ tenderness  MSK: + R CVA tenderness  Neuro: awake, alert, moving all extremities, cranial nerves 2-12 intact, sensation intact, no dysmetria.  Skin: No rashes

## 2023-04-01 NOTE — ASU DISCHARGE PLAN (ADULT/PEDIATRIC) - CARE PROVIDER_API CALL
Jeremy Elias)  Urology  [address], [address] [place] [address]  Phone: [phone]  Fax: [phone]  Follow Up Time:

## 2023-04-01 NOTE — BRIEF OPERATIVE NOTE - NSICDXBRIEFPROCEDURE_GEN_ALL_CORE_FT
PROCEDURES:  Cystoscopy with insertion of ureteral stent 01-Apr-2023 12:46:46  Jeremy Elias  Cystoscopy with stent placement 01-Apr-2023 14:08:09  Jeremy Elias

## 2023-04-01 NOTE — ED PROVIDER NOTE - NSICDXFAMILYHX_GEN_ALL_CORE_FT
Adequate: hears normal conversation without difficulty
FAMILY HISTORY:  Father  Still living? Unknown  Family history unknown, Age at diagnosis: Age Unknown    Mother  Still living? Unknown  Family history unknown, Age at diagnosis: Age Unknown

## 2023-04-01 NOTE — ED ADULT TRIAGE NOTE - CHIEF COMPLAINT QUOTE
Pt presents to ER c/o right sided flank pain. Pt reports she had confirmed b/l stones beginning 4 months and passed one but pain became exacerbated 3 days PTA. Denies urinary symptoms. Following . Pt took Advil 600mg 45 minutes PTA

## 2023-04-01 NOTE — ED PROVIDER NOTE - PROGRESS NOTE DETAILS
Fidel DENNISON: Admitting spoke with urology; Dr. Elias to admit and take to OR; will change admitting physician at this time.

## 2023-04-01 NOTE — ED PROVIDER NOTE - OBJECTIVE STATEMENT
27 y/o F w/PMHx of IBS presents to ED c/o R sided flank pain, radiating to back onset this morning. pt reports she had confirmed b/l stones beginning x 4 months and passed one but pain became exacerbated x 3 days PTA. Notes that x 3 weeks ago, pt has had imaging done with Dr. Tirado and confirmed that there are 3 kidney stones. Pt adds that urine appears darker x 2 days. In ED waiting room, pt experienced nausea. 27 y/o F w/PMHx of IBS presents to ED c/o R sided flank pain, radiating to back onset this morning. pt reports she has hx of renal colic; admitted in past for the same; reports worsening pain over last 48 hours; nausea; vomiting; reports egg retrieval 2 weeks ago; "dark urine"- no dysuria; started with menses yesterday per patient- denies pregnancy.

## 2023-04-02 LAB
CULTURE RESULTS: SIGNIFICANT CHANGE UP
SPECIMEN SOURCE: SIGNIFICANT CHANGE UP

## 2023-04-03 ENCOUNTER — NON-APPOINTMENT (OUTPATIENT)
Age: 28
End: 2023-04-03

## 2023-04-04 ENCOUNTER — NON-APPOINTMENT (OUTPATIENT)
Age: 28
End: 2023-04-04

## 2023-04-10 DIAGNOSIS — N20.1 CALCULUS OF URETER: ICD-10-CM

## 2023-04-10 DIAGNOSIS — K58.9 IRRITABLE BOWEL SYNDROME WITHOUT DIARRHEA: ICD-10-CM

## 2023-04-10 DIAGNOSIS — F41.9 ANXIETY DISORDER, UNSPECIFIED: ICD-10-CM

## 2023-04-10 DIAGNOSIS — N20.0 CALCULUS OF KIDNEY: ICD-10-CM

## 2023-04-10 DIAGNOSIS — F32.A DEPRESSION, UNSPECIFIED: ICD-10-CM

## 2023-04-10 DIAGNOSIS — Z90.49 ACQUIRED ABSENCE OF OTHER SPECIFIED PARTS OF DIGESTIVE TRACT: ICD-10-CM

## 2023-04-10 DIAGNOSIS — R56.9 UNSPECIFIED CONVULSIONS: ICD-10-CM

## 2023-04-11 ENCOUNTER — OUTPATIENT (OUTPATIENT)
Dept: OUTPATIENT SERVICES | Facility: HOSPITAL | Age: 28
LOS: 1 days | End: 2023-04-11
Payer: COMMERCIAL

## 2023-04-11 ENCOUNTER — APPOINTMENT (OUTPATIENT)
Dept: RADIOLOGY | Facility: CLINIC | Age: 28
End: 2023-04-11
Payer: COMMERCIAL

## 2023-04-11 DIAGNOSIS — N20.0 CALCULUS OF KIDNEY: ICD-10-CM

## 2023-04-11 DIAGNOSIS — Z98.890 OTHER SPECIFIED POSTPROCEDURAL STATES: Chronic | ICD-10-CM

## 2023-04-11 DIAGNOSIS — Z90.49 ACQUIRED ABSENCE OF OTHER SPECIFIED PARTS OF DIGESTIVE TRACT: Chronic | ICD-10-CM

## 2023-04-11 PROCEDURE — 71046 X-RAY EXAM CHEST 2 VIEWS: CPT

## 2023-04-11 PROCEDURE — 71046 X-RAY EXAM CHEST 2 VIEWS: CPT | Mod: 26

## 2023-04-12 ENCOUNTER — NON-APPOINTMENT (OUTPATIENT)
Age: 28
End: 2023-04-12

## 2023-04-12 LAB
ANION GAP SERPL CALC-SCNC: 11 MMOL/L
APPEARANCE: CLEAR
APTT BLD: 30.5 SEC
BACTERIA: NEGATIVE
BILIRUBIN URINE: NEGATIVE
BLOOD URINE: ABNORMAL
BUN SERPL-MCNC: 12 MG/DL
CALCIUM SERPL-MCNC: 9.8 MG/DL
CHLORIDE SERPL-SCNC: 103 MMOL/L
CO2 SERPL-SCNC: 27 MMOL/L
COLOR: ABNORMAL
CREAT SERPL-MCNC: 0.58 MG/DL
EGFR: 125 ML/MIN/1.73M2
GLUCOSE QUALITATIVE U: NEGATIVE
GLUCOSE SERPL-MCNC: 89 MG/DL
HCT VFR BLD CALC: 39.5 %
HGB BLD-MCNC: 12.3 G/DL
HYALINE CASTS: 0 /LPF
INR PPP: 1.02 RATIO
KETONES URINE: NEGATIVE
LEUKOCYTE ESTERASE URINE: NEGATIVE
MCHC RBC-ENTMCNC: 30.1 PG
MCHC RBC-ENTMCNC: 31.1 GM/DL
MCV RBC AUTO: 96.6 FL
MICROSCOPIC-UA: NORMAL
NITRITE URINE: NEGATIVE
PH URINE: 6
PLATELET # BLD AUTO: 477 K/UL
POTASSIUM SERPL-SCNC: 4.5 MMOL/L
PROTEIN URINE: ABNORMAL
PT BLD: 11.8 SEC
RBC # BLD: 4.09 M/UL
RBC # FLD: 12.9 %
RED BLOOD CELLS URINE: 191 /HPF
SODIUM SERPL-SCNC: 141 MMOL/L
SPECIFIC GRAVITY URINE: >=1.03
SQUAMOUS EPITHELIAL CELLS: 3 /HPF
UROBILINOGEN URINE: NORMAL
WBC # FLD AUTO: 8.68 K/UL
WHITE BLOOD CELLS URINE: 5 /HPF

## 2023-04-13 LAB — BACTERIA UR CULT: NORMAL

## 2023-04-14 RX ORDER — KETOCONAZOLE 20 MG/G
1 AEROSOL, FOAM TOPICAL
Qty: 0 | Refills: 0 | DISCHARGE

## 2023-04-14 NOTE — ASU PATIENT PROFILE, ADULT - FALL HARM RISK - UNIVERSAL INTERVENTIONS
Bed in lowest position, wheels locked, appropriate side rails in place/Call bell, personal items and telephone in reach/Instruct patient to call for assistance before getting out of bed or chair/Non-slip footwear when patient is out of bed/[place] to call system/Physically safe environment - no spills, clutter or unnecessary equipment/Purposeful Proactive Rounding/Room/bathroom lighting operational, light cord in reach

## 2023-04-17 ENCOUNTER — OUTPATIENT (OUTPATIENT)
Dept: INPATIENT UNIT | Facility: HOSPITAL | Age: 28
LOS: 1 days | Discharge: HOME | End: 2023-04-17
Payer: COMMERCIAL

## 2023-04-17 ENCOUNTER — RESULT REVIEW (OUTPATIENT)
Age: 28
End: 2023-04-17

## 2023-04-17 ENCOUNTER — TRANSCRIPTION ENCOUNTER (OUTPATIENT)
Age: 28
End: 2023-04-17

## 2023-04-17 ENCOUNTER — APPOINTMENT (OUTPATIENT)
Dept: UROLOGY | Facility: HOSPITAL | Age: 28
End: 2023-04-17

## 2023-04-17 VITALS
RESPIRATION RATE: 16 BRPM | HEART RATE: 97 BPM | TEMPERATURE: 98 F | SYSTOLIC BLOOD PRESSURE: 108 MMHG | DIASTOLIC BLOOD PRESSURE: 72 MMHG | OXYGEN SATURATION: 99 %

## 2023-04-17 VITALS
SYSTOLIC BLOOD PRESSURE: 121 MMHG | WEIGHT: 139.99 LBS | HEIGHT: 64 IN | TEMPERATURE: 98 F | DIASTOLIC BLOOD PRESSURE: 72 MMHG | OXYGEN SATURATION: 100 % | RESPIRATION RATE: 16 BRPM | HEART RATE: 76 BPM

## 2023-04-17 DIAGNOSIS — N20.1 CALCULUS OF URETER: ICD-10-CM

## 2023-04-17 DIAGNOSIS — Z98.890 OTHER SPECIFIED POSTPROCEDURAL STATES: Chronic | ICD-10-CM

## 2023-04-17 DIAGNOSIS — Z90.49 ACQUIRED ABSENCE OF OTHER SPECIFIED PARTS OF DIGESTIVE TRACT: Chronic | ICD-10-CM

## 2023-04-17 LAB
APTT BLD: 29.4 SEC — SIGNIFICANT CHANGE UP (ref 27.5–35.5)
BLD GP AB SCN SERPL QL: SIGNIFICANT CHANGE UP
HCG UR QL: NEGATIVE — SIGNIFICANT CHANGE UP
INR BLD: 1.01 RATIO — SIGNIFICANT CHANGE UP (ref 0.88–1.16)
PROTHROM AB SERPL-ACNC: 11.7 SEC — SIGNIFICANT CHANGE UP (ref 10.5–13.4)

## 2023-04-17 PROCEDURE — 86901 BLOOD TYPING SEROLOGIC RH(D): CPT

## 2023-04-17 PROCEDURE — C2617: CPT

## 2023-04-17 PROCEDURE — 81025 URINE PREGNANCY TEST: CPT

## 2023-04-17 PROCEDURE — 86900 BLOOD TYPING SEROLOGIC ABO: CPT

## 2023-04-17 PROCEDURE — 85730 THROMBOPLASTIN TIME PARTIAL: CPT

## 2023-04-17 PROCEDURE — 93005 ELECTROCARDIOGRAM TRACING: CPT

## 2023-04-17 PROCEDURE — 93010 ELECTROCARDIOGRAM REPORT: CPT

## 2023-04-17 PROCEDURE — 88300 SURGICAL PATH GROSS: CPT | Mod: 26

## 2023-04-17 PROCEDURE — 36415 COLL VENOUS BLD VENIPUNCTURE: CPT

## 2023-04-17 PROCEDURE — C1894: CPT

## 2023-04-17 PROCEDURE — 76000 FLUOROSCOPY <1 HR PHYS/QHP: CPT

## 2023-04-17 PROCEDURE — 86850 RBC ANTIBODY SCREEN: CPT

## 2023-04-17 PROCEDURE — 88300 SURGICAL PATH GROSS: CPT

## 2023-04-17 PROCEDURE — C1769: CPT

## 2023-04-17 PROCEDURE — C1889: CPT

## 2023-04-17 PROCEDURE — 85610 PROTHROMBIN TIME: CPT

## 2023-04-17 PROCEDURE — 82365 CALCULUS SPECTROSCOPY: CPT

## 2023-04-17 PROCEDURE — 52352 CYSTOURETERO W/STONE REMOVE: CPT | Mod: RT,22

## 2023-04-17 RX ORDER — FENTANYL CITRATE 50 UG/ML
50 INJECTION INTRAVENOUS
Refills: 0 | Status: DISCONTINUED | OUTPATIENT
Start: 2023-04-17 | End: 2023-04-17

## 2023-04-17 RX ORDER — SODIUM CHLORIDE 9 MG/ML
1000 INJECTION, SOLUTION INTRAVENOUS
Refills: 0 | Status: DISCONTINUED | OUTPATIENT
Start: 2023-04-17 | End: 2023-04-17

## 2023-04-17 RX ORDER — TAMSULOSIN HYDROCHLORIDE 0.4 MG/1
1 CAPSULE ORAL
Qty: 15 | Refills: 0
Start: 2023-04-17 | End: 2023-05-01

## 2023-04-17 RX ORDER — KETOROLAC TROMETHAMINE 30 MG/ML
1 SYRINGE (ML) INJECTION
Qty: 15 | Refills: 0
Start: 2023-04-17 | End: 2023-04-21

## 2023-04-17 RX ORDER — PHENAZOPYRIDINE HCL 100 MG
1 TABLET ORAL
Qty: 9 | Refills: 0
Start: 2023-04-17 | End: 2023-04-19

## 2023-04-17 RX ORDER — OXYCODONE HYDROCHLORIDE 5 MG/1
5 TABLET ORAL ONCE
Refills: 0 | Status: DISCONTINUED | OUTPATIENT
Start: 2023-04-17 | End: 2023-04-17

## 2023-04-17 RX ORDER — PHENAZOPYRIDINE HCL 100 MG
200 TABLET ORAL ONCE
Refills: 0 | Status: COMPLETED | OUTPATIENT
Start: 2023-04-17 | End: 2023-04-17

## 2023-04-17 RX ORDER — ONDANSETRON 8 MG/1
4 TABLET, FILM COATED ORAL EVERY 6 HOURS
Refills: 0 | Status: DISCONTINUED | OUTPATIENT
Start: 2023-04-17 | End: 2023-04-17

## 2023-04-17 RX ADMIN — Medication 200 MILLIGRAM(S): at 14:44

## 2023-04-17 RX ADMIN — SODIUM CHLORIDE 75 MILLILITER(S): 9 INJECTION, SOLUTION INTRAVENOUS at 14:46

## 2023-04-17 NOTE — BRIEF OPERATIVE NOTE - OPERATION/FINDINGS
1. 5 mm right mid ureteral stone: extracted.   2. 3 mm right upper pole kidney stone: extracted.   3. 1 mm right lower pole kidney stone: extracted.

## 2023-04-17 NOTE — ASU DISCHARGE PLAN (ADULT/PEDIATRIC) - NS MD DC FALL RISK RISK
For information on Fall & Injury Prevention, visit: [email or web address] OR  [email or web address] OR  https://www.cdc.gov/steadi/patient.html

## 2023-04-17 NOTE — BRIEF OPERATIVE NOTE - NSICDXBRIEFPREOP_GEN_ALL_CORE_FT
PRE-OP DIAGNOSIS:  Right ureteral stone 17-Apr-2023 14:18:46  Tucker Pena  Kidney stones 17-Apr-2023 14:19:02 Right Tucker Pena

## 2023-04-17 NOTE — BRIEF OPERATIVE NOTE - NSICDXBRIEFPOSTOP_GEN_ALL_CORE_FT
POST-OP DIAGNOSIS:  Right ureteral stone 17-Apr-2023 14:19:16  Tucker Pena  Kidney stones 17-Apr-2023 14:19:20 Right Tucker Pena

## 2023-04-17 NOTE — ASU DISCHARGE PLAN (ADULT/PEDIATRIC) - CARE PROVIDER_API CALL
Tucker Pena)  Urology  [address] [place] [address]  Phone: [phone]  Fax: [phone]  Scheduled Appointment: 04/21/2023 02:40 PM

## 2023-04-19 DIAGNOSIS — N20.2 CALCULUS OF KIDNEY WITH CALCULUS OF URETER: ICD-10-CM

## 2023-04-19 DIAGNOSIS — F41.9 ANXIETY DISORDER, UNSPECIFIED: ICD-10-CM

## 2023-04-19 DIAGNOSIS — F32.9 MAJOR DEPRESSIVE DISORDER, SINGLE EPISODE, UNSPECIFIED: ICD-10-CM

## 2023-04-19 DIAGNOSIS — Z90.49 ACQUIRED ABSENCE OF OTHER SPECIFIED PARTS OF DIGESTIVE TRACT: ICD-10-CM

## 2023-04-19 DIAGNOSIS — G40.909 EPILEPSY, UNSPECIFIED, NOT INTRACTABLE, WITHOUT STATUS EPILEPTICUS: ICD-10-CM

## 2023-04-19 LAB — SURGICAL PATHOLOGY STUDY: SIGNIFICANT CHANGE UP

## 2023-04-21 ENCOUNTER — NON-APPOINTMENT (OUTPATIENT)
Age: 28
End: 2023-04-21

## 2023-04-21 ENCOUNTER — APPOINTMENT (OUTPATIENT)
Dept: UROLOGY | Facility: CLINIC | Age: 28
End: 2023-04-21
Payer: COMMERCIAL

## 2023-04-21 VITALS
DIASTOLIC BLOOD PRESSURE: 88 MMHG | OXYGEN SATURATION: 98 % | HEIGHT: 64 IN | SYSTOLIC BLOOD PRESSURE: 138 MMHG | BODY MASS INDEX: 23.9 KG/M2 | RESPIRATION RATE: 16 BRPM | WEIGHT: 140 LBS | HEART RATE: 85 BPM

## 2023-04-21 DIAGNOSIS — E83.50 UNSPECIFIED DISORDER OF CALCIUM METABOLISM: ICD-10-CM

## 2023-04-21 DIAGNOSIS — N20.1 CALCULUS OF URETER: ICD-10-CM

## 2023-04-21 DIAGNOSIS — Z46.6 ENCOUNTER FOR FITTING AND ADJUSTMENT OF URINARY DEVICE: ICD-10-CM

## 2023-04-21 PROCEDURE — 99214 OFFICE O/P EST MOD 30 MIN: CPT | Mod: 25

## 2023-04-21 PROCEDURE — 52310 CYSTOSCOPY AND TREATMENT: CPT

## 2023-04-21 RX ORDER — ALPRAZOLAM 0.5 MG/1
0.5 TABLET ORAL
Qty: 2 | Refills: 0 | Status: ACTIVE | COMMUNITY
Start: 2023-04-21 | End: 1900-01-01

## 2023-04-23 PROBLEM — Z46.6 ENCOUNTER FOR REMOVAL OF URETERAL STENT: Status: ACTIVE | Noted: 2023-04-23

## 2023-04-23 PROBLEM — E83.50 DISORDER OF CALCIUM METABOLISM: Status: ACTIVE | Noted: 2023-04-23

## 2023-04-23 NOTE — HISTORY OF PRESENT ILLNESS
[FreeTextEntry1] : 28 year old female presents for follow up. \par Has some lower urinary tract symptoms and pain/discomfort from ureteral stent.\par  \par Status post right ureteroscopy, laser lithotripsy, stone extraction and ureteral stent exchange on 4/17/23 at [facility]. \par \par Passed a right kidney stone in December 2022. \par \par Has been on Anti seizure medicines in the past. \par Currently off all medicines for  in vitro fertilization. \par \par \par

## 2023-04-23 NOTE — ASSESSMENT
[FreeTextEntry1] : Reviewed outside records on [employer]. \par Stone analysis:\par 100% Calcium oxalate monohydrate.\par \par Disorder of calcium metabolism:\par Discussed stone analysis. \par Recommended Kidney stone prevention diet:\par Good oral hydration so that urine is clear to light yellow, usually 1.5 to 2 Liters of fluids, mainly water\par Increasing Citrate in diet by consuming citrus fruits and juices- lilian, limes, oranges, grapefruits and berries \par Less red meat\par Less salt\par Limit foods with oxalate like- dark green vegetables, rhubarb, chocolate, wheat bran, nuts, cranberries, and beans\par Discussed Kidney stone metabolic work up: Calcium, Parathyroid Hormone, Uric acid and 24 Hr urine kidney stone risk profile. Will do it at later date. \par Will be starting process for  in vitro fertilization in 2 weeks. \par \par Kidney and ureteral stone:\par Status post ureteroscopy. \par Performed cystoscopy and right ureteral stent removal today. \par Will get Renal and Bladder Ultrasound in 2 months.\par \par Will inform results. \par \par \par \par \par Will get pregnant via IVF.

## 2023-04-24 ENCOUNTER — RX CHANGE (OUTPATIENT)
Age: 28
End: 2023-04-24

## 2023-04-24 RX ORDER — OXYBUTYNIN CHLORIDE 10 MG/1
10 TABLET, EXTENDED RELEASE ORAL
Qty: 30 | Refills: 3 | Status: DISCONTINUED | COMMUNITY
Start: 2023-04-01 | End: 2023-04-24

## 2023-04-24 RX ORDER — OXYBUTYNIN CHLORIDE 10 MG/1
10 TABLET, EXTENDED RELEASE ORAL
Qty: 90 | Refills: 2 | Status: ACTIVE | COMMUNITY
Start: 2023-04-24 | End: 1900-01-01

## 2023-04-26 LAB
CELL MATERIAL STONE EST-MCNT: SIGNIFICANT CHANGE UP
CELL MATERIAL STONE EST-MCNT: SIGNIFICANT CHANGE UP
LABORATORY COMMENT REPORT: SIGNIFICANT CHANGE UP
LABORATORY COMMENT REPORT: SIGNIFICANT CHANGE UP
NIDUS STONE QN: SIGNIFICANT CHANGE UP
NIDUS STONE QN: SIGNIFICANT CHANGE UP

## 2023-04-27 LAB
CELL MATERIAL STONE EST-MCNT: SIGNIFICANT CHANGE UP
LABORATORY COMMENT REPORT: SIGNIFICANT CHANGE UP
NIDUS STONE QN: SIGNIFICANT CHANGE UP

## 2023-05-04 NOTE — ASU DISCHARGE PLAN (ADULT/PEDIATRIC) - A. DRIVE A CAR, OPERATE POWER TOOLS OR MACHINERY
M62.81 generalized weakness, decreased ADL management and functional transfers/mobility. Statement Selected

## 2023-06-09 ENCOUNTER — EMERGENCY (EMERGENCY)
Facility: HOSPITAL | Age: 28
LOS: 0 days | Discharge: HOME | End: 2023-06-09
Attending: EMERGENCY MEDICINE
Payer: COMMERCIAL

## 2023-06-09 VITALS
SYSTOLIC BLOOD PRESSURE: 108 MMHG | TEMPERATURE: 98 F | HEART RATE: 75 BPM | OXYGEN SATURATION: 100 % | RESPIRATION RATE: 16 BRPM | DIASTOLIC BLOOD PRESSURE: 62 MMHG

## 2023-06-09 VITALS — WEIGHT: 139.99 LBS | HEIGHT: 64 IN

## 2023-06-09 DIAGNOSIS — Z98.890 OTHER SPECIFIED POSTPROCEDURAL STATES: Chronic | ICD-10-CM

## 2023-06-09 DIAGNOSIS — Z90.49 ACQUIRED ABSENCE OF OTHER SPECIFIED PARTS OF DIGESTIVE TRACT: Chronic | ICD-10-CM

## 2023-06-09 DIAGNOSIS — R10.31 RIGHT LOWER QUADRANT PAIN: ICD-10-CM

## 2023-06-09 DIAGNOSIS — F32.A DEPRESSION, UNSPECIFIED: ICD-10-CM

## 2023-06-09 DIAGNOSIS — K58.9 IRRITABLE BOWEL SYNDROME WITHOUT DIARRHEA: ICD-10-CM

## 2023-06-09 DIAGNOSIS — Z90.49 ACQUIRED ABSENCE OF OTHER SPECIFIED PARTS OF DIGESTIVE TRACT: ICD-10-CM

## 2023-06-09 DIAGNOSIS — O20.0 THREATENED ABORTION: ICD-10-CM

## 2023-06-09 DIAGNOSIS — Z86.69 PERSONAL HISTORY OF OTHER DISEASES OF THE NERVOUS SYSTEM AND SENSE ORGANS: ICD-10-CM

## 2023-06-09 DIAGNOSIS — N93.9 ABNORMAL UTERINE AND VAGINAL BLEEDING, UNSPECIFIED: ICD-10-CM

## 2023-06-09 DIAGNOSIS — O99.341 OTHER MENTAL DISORDERS COMPLICATING PREGNANCY, FIRST TRIMESTER: ICD-10-CM

## 2023-06-09 DIAGNOSIS — O99.611 DISEASES OF THE DIGESTIVE SYSTEM COMPLICATING PREGNANCY, FIRST TRIMESTER: ICD-10-CM

## 2023-06-09 DIAGNOSIS — Z3A.08 8 WEEKS GESTATION OF PREGNANCY: ICD-10-CM

## 2023-06-09 DIAGNOSIS — F41.9 ANXIETY DISORDER, UNSPECIFIED: ICD-10-CM

## 2023-06-09 LAB
ALBUMIN SERPL ELPH-MCNC: 3.7 G/DL — SIGNIFICANT CHANGE UP (ref 3.3–5)
ALP SERPL-CCNC: 107 U/L — SIGNIFICANT CHANGE UP (ref 40–120)
ALT FLD-CCNC: 19 U/L — SIGNIFICANT CHANGE UP (ref 12–78)
ANION GAP SERPL CALC-SCNC: 4 MMOL/L — LOW (ref 5–17)
APPEARANCE UR: CLEAR — SIGNIFICANT CHANGE UP
AST SERPL-CCNC: 12 U/L — LOW (ref 15–37)
BASOPHILS # BLD AUTO: 0.02 K/UL — SIGNIFICANT CHANGE UP (ref 0–0.2)
BASOPHILS NFR BLD AUTO: 0.2 % — SIGNIFICANT CHANGE UP (ref 0–2)
BILIRUB SERPL-MCNC: 0.7 MG/DL — SIGNIFICANT CHANGE UP (ref 0.2–1.2)
BILIRUB UR-MCNC: NEGATIVE — SIGNIFICANT CHANGE UP
BLD GP AB SCN SERPL QL: SIGNIFICANT CHANGE UP
BUN SERPL-MCNC: 10 MG/DL — SIGNIFICANT CHANGE UP (ref 7–23)
CALCIUM SERPL-MCNC: 10.1 MG/DL — SIGNIFICANT CHANGE UP (ref 8.5–10.1)
CHLORIDE SERPL-SCNC: 105 MMOL/L — SIGNIFICANT CHANGE UP (ref 96–108)
CO2 SERPL-SCNC: 27 MMOL/L — SIGNIFICANT CHANGE UP (ref 22–31)
COLOR SPEC: YELLOW — SIGNIFICANT CHANGE UP
CREAT SERPL-MCNC: 0.61 MG/DL — SIGNIFICANT CHANGE UP (ref 0.5–1.3)
DIFF PNL FLD: NEGATIVE — SIGNIFICANT CHANGE UP
EGFR: 123 ML/MIN/1.73M2 — SIGNIFICANT CHANGE UP
EOSINOPHIL # BLD AUTO: 0.08 K/UL — SIGNIFICANT CHANGE UP (ref 0–0.5)
EOSINOPHIL NFR BLD AUTO: 0.7 % — SIGNIFICANT CHANGE UP (ref 0–6)
GLUCOSE SERPL-MCNC: 90 MG/DL — SIGNIFICANT CHANGE UP (ref 70–99)
GLUCOSE UR QL: NEGATIVE — SIGNIFICANT CHANGE UP
HCG SERPL-ACNC: HIGH MIU/ML
HCT VFR BLD CALC: 38.1 % — SIGNIFICANT CHANGE UP (ref 34.5–45)
HGB BLD-MCNC: 13 G/DL — SIGNIFICANT CHANGE UP (ref 11.5–15.5)
IMM GRANULOCYTES NFR BLD AUTO: 0.2 % — SIGNIFICANT CHANGE UP (ref 0–0.9)
KETONES UR-MCNC: NEGATIVE — SIGNIFICANT CHANGE UP
LEUKOCYTE ESTERASE UR-ACNC: NEGATIVE — SIGNIFICANT CHANGE UP
LYMPHOCYTES # BLD AUTO: 35.3 % — SIGNIFICANT CHANGE UP (ref 13–44)
LYMPHOCYTES # BLD AUTO: 4.27 K/UL — HIGH (ref 1–3.3)
MCHC RBC-ENTMCNC: 30 PG — SIGNIFICANT CHANGE UP (ref 27–34)
MCHC RBC-ENTMCNC: 34.1 GM/DL — SIGNIFICANT CHANGE UP (ref 32–36)
MCV RBC AUTO: 87.8 FL — SIGNIFICANT CHANGE UP (ref 80–100)
MONOCYTES # BLD AUTO: 0.67 K/UL — SIGNIFICANT CHANGE UP (ref 0–0.9)
MONOCYTES NFR BLD AUTO: 5.5 % — SIGNIFICANT CHANGE UP (ref 2–14)
NEUTROPHILS # BLD AUTO: 7.04 K/UL — SIGNIFICANT CHANGE UP (ref 1.8–7.4)
NEUTROPHILS NFR BLD AUTO: 58.1 % — SIGNIFICANT CHANGE UP (ref 43–77)
NITRITE UR-MCNC: NEGATIVE — SIGNIFICANT CHANGE UP
PH UR: 6 — SIGNIFICANT CHANGE UP (ref 5–8)
PLATELET # BLD AUTO: 391 K/UL — SIGNIFICANT CHANGE UP (ref 150–400)
POTASSIUM SERPL-MCNC: 3.8 MMOL/L — SIGNIFICANT CHANGE UP (ref 3.5–5.3)
POTASSIUM SERPL-SCNC: 3.8 MMOL/L — SIGNIFICANT CHANGE UP (ref 3.5–5.3)
PROT SERPL-MCNC: 9.1 GM/DL — HIGH (ref 6–8.3)
PROT UR-MCNC: NEGATIVE — SIGNIFICANT CHANGE UP
RBC # BLD: 4.34 M/UL — SIGNIFICANT CHANGE UP (ref 3.8–5.2)
RBC # FLD: 12.1 % — SIGNIFICANT CHANGE UP (ref 10.3–14.5)
SODIUM SERPL-SCNC: 136 MMOL/L — SIGNIFICANT CHANGE UP (ref 135–145)
SP GR SPEC: 1.01 — SIGNIFICANT CHANGE UP (ref 1.01–1.02)
UROBILINOGEN FLD QL: NEGATIVE — SIGNIFICANT CHANGE UP
WBC # BLD: 12.11 K/UL — HIGH (ref 3.8–10.5)
WBC # FLD AUTO: 12.11 K/UL — HIGH (ref 3.8–10.5)

## 2023-06-09 PROCEDURE — 99284 EMERGENCY DEPT VISIT MOD MDM: CPT | Mod: 25

## 2023-06-09 PROCEDURE — 84702 CHORIONIC GONADOTROPIN TEST: CPT

## 2023-06-09 PROCEDURE — 80053 COMPREHEN METABOLIC PANEL: CPT

## 2023-06-09 PROCEDURE — 99284 EMERGENCY DEPT VISIT MOD MDM: CPT

## 2023-06-09 PROCEDURE — 86900 BLOOD TYPING SEROLOGIC ABO: CPT

## 2023-06-09 PROCEDURE — 76817 TRANSVAGINAL US OBSTETRIC: CPT

## 2023-06-09 PROCEDURE — 87086 URINE CULTURE/COLONY COUNT: CPT

## 2023-06-09 PROCEDURE — 81003 URINALYSIS AUTO W/O SCOPE: CPT

## 2023-06-09 PROCEDURE — 85025 COMPLETE CBC W/AUTO DIFF WBC: CPT

## 2023-06-09 PROCEDURE — 86901 BLOOD TYPING SEROLOGIC RH(D): CPT

## 2023-06-09 PROCEDURE — 36415 COLL VENOUS BLD VENIPUNCTURE: CPT

## 2023-06-09 PROCEDURE — 86850 RBC ANTIBODY SCREEN: CPT

## 2023-06-09 PROCEDURE — 76817 TRANSVAGINAL US OBSTETRIC: CPT | Mod: 26

## 2023-06-09 RX ORDER — SODIUM CHLORIDE 9 MG/ML
1000 INJECTION INTRAMUSCULAR; INTRAVENOUS; SUBCUTANEOUS ONCE
Refills: 0 | Status: COMPLETED | OUTPATIENT
Start: 2023-06-09 | End: 2023-06-09

## 2023-06-09 RX ADMIN — SODIUM CHLORIDE 1000 MILLILITER(S): 9 INJECTION INTRAMUSCULAR; INTRAVENOUS; SUBCUTANEOUS at 21:31

## 2023-06-09 NOTE — ED STATDOCS - PHYSICAL EXAMINATION
Constitutional: NAD AOx3  Eyes: PERRL EOMI  Head: Normocephalic atraumatic  Mouth: MMM  Cardiac: regular rate and rhythm  Resp: Lungs CTAB  GI: Abd s/nd/nt  Neuro: CN2-12 grossly intact, DOAN x 4  Skin: No visible rashes

## 2023-06-09 NOTE — ED ADULT NURSE NOTE - OBJECTIVE STATEMENT
Pt presents to ED c/o vaginal bleeding and RLQ cramping, states she is 9 weeks pregnant and the symptoms began today. respirations even and unlabored. skin warm and dry. will continue to monitor awaiting US

## 2023-06-09 NOTE — ED ADULT TRIAGE NOTE - BMI (KG/M2)
From: Sedrick Heaton  To: Aaron Hunt MD  Sent: 3/13/2018 6:29 PM CDT  Subject: Wrist and Hand Discomfort     Hello Dr. Bass,    Recently I have been having a recurring pain in my wrist and hands. I was wondering if I should schedule an appointment with you or if you could give me a referral to see an Orthopedic doctor.    The best way to reach me is at, [phone].    Thank you,  Sedrick Heaton    24

## 2023-06-09 NOTE — ED STATDOCS - PATIENT PORTAL LINK FT
You can access the FollowMyHealth Patient Portal offered by [facility] by registering at the following website: [email or web address]. By joining [facility]’s FollowMyHealth portal, you will also be able to view your health information using other applications (apps) compatible with our system.

## 2023-06-09 NOTE — ED ADULT TRIAGE NOTE - CHIEF COMPLAINT QUOTE
Pt presents to ED c/o vaginal bleeding since about 2pm today. pt states she is 9 week pregnant. cramping to RLQ

## 2023-06-09 NOTE — ED STATDOCS - PROGRESS NOTE DETAILS
pt us and lab results reviewed with pt. pt aware strict bedrest, no heavy lifting, pelvic reset and close OB fu. pt agrees with plan and well appearing on dc. -Saima Mckoy PA-C

## 2023-06-09 NOTE — ED ADULT NURSE NOTE - NSFALLUNIVINTERV_ED_ALL_ED
Bed/Stretcher in lowest position, wheels locked, appropriate side rails in place/Call bell, personal items and telephone in reach/Instruct patient to call for assistance before getting out of bed/chair/stretcher/Non-slip footwear applied when patient is off stretcher/[place] to call system/Physically safe environment - no spills, clutter or unnecessary equipment/Purposeful proactive rounding/Room/bathroom lighting operational, light cord in reach

## 2023-06-09 NOTE — ED STATDOCS - OBJECTIVE STATEMENT
28 year old female 8 weeks pregnant with hx of umbilical hernia, cholecystectomy, IBS presents to the ED c/o vaginal spotting and RLQ cramping today. Saw OBGYN Dr. Parsons at [facility] a few days ago, and everything was normal. Normal PO intake.

## 2023-06-12 LAB
CULTURE RESULTS: SIGNIFICANT CHANGE UP
SPECIMEN SOURCE: SIGNIFICANT CHANGE UP

## 2023-10-04 ENCOUNTER — EMERGENCY (EMERGENCY)
Facility: HOSPITAL | Age: 28
LOS: 0 days | Discharge: HOME | End: 2023-10-04
Attending: STUDENT IN AN ORGANIZED HEALTH CARE EDUCATION/TRAINING PROGRAM
Payer: COMMERCIAL

## 2023-10-04 VITALS
TEMPERATURE: 98 F | HEART RATE: 88 BPM | DIASTOLIC BLOOD PRESSURE: 76 MMHG | RESPIRATION RATE: 18 BRPM | SYSTOLIC BLOOD PRESSURE: 108 MMHG | OXYGEN SATURATION: 98 %

## 2023-10-04 VITALS — WEIGHT: 154.98 LBS | HEIGHT: 64 IN

## 2023-10-04 DIAGNOSIS — F41.9 ANXIETY DISORDER, UNSPECIFIED: ICD-10-CM

## 2023-10-04 DIAGNOSIS — O99.891 OTHER SPECIFIED DISEASES AND CONDITIONS COMPLICATING PREGNANCY: ICD-10-CM

## 2023-10-04 DIAGNOSIS — R09.81 NASAL CONGESTION: ICD-10-CM

## 2023-10-04 DIAGNOSIS — Z98.890 OTHER SPECIFIED POSTPROCEDURAL STATES: Chronic | ICD-10-CM

## 2023-10-04 DIAGNOSIS — Z91.018 ALLERGY TO OTHER FOODS: ICD-10-CM

## 2023-10-04 DIAGNOSIS — J02.9 ACUTE PHARYNGITIS, UNSPECIFIED: ICD-10-CM

## 2023-10-04 DIAGNOSIS — Z90.49 ACQUIRED ABSENCE OF OTHER SPECIFIED PARTS OF DIGESTIVE TRACT: Chronic | ICD-10-CM

## 2023-10-04 DIAGNOSIS — F32.A DEPRESSION, UNSPECIFIED: ICD-10-CM

## 2023-10-04 DIAGNOSIS — R06.02 SHORTNESS OF BREATH: ICD-10-CM

## 2023-10-04 DIAGNOSIS — R05.9 COUGH, UNSPECIFIED: ICD-10-CM

## 2023-10-04 DIAGNOSIS — Z90.49 ACQUIRED ABSENCE OF OTHER SPECIFIED PARTS OF DIGESTIVE TRACT: ICD-10-CM

## 2023-10-04 DIAGNOSIS — O99.612 DISEASES OF THE DIGESTIVE SYSTEM COMPLICATING PREGNANCY, SECOND TRIMESTER: ICD-10-CM

## 2023-10-04 DIAGNOSIS — O99.512 DISEASES OF THE RESPIRATORY SYSTEM COMPLICATING PREGNANCY, SECOND TRIMESTER: ICD-10-CM

## 2023-10-04 DIAGNOSIS — K58.9 IRRITABLE BOWEL SYNDROME WITHOUT DIARRHEA: ICD-10-CM

## 2023-10-04 DIAGNOSIS — O99.342 OTHER MENTAL DISORDERS COMPLICATING PREGNANCY, SECOND TRIMESTER: ICD-10-CM

## 2023-10-04 DIAGNOSIS — Z3A.25 25 WEEKS GESTATION OF PREGNANCY: ICD-10-CM

## 2023-10-04 LAB
ALBUMIN SERPL ELPH-MCNC: 2.6 G/DL — LOW (ref 3.3–5)
ALP SERPL-CCNC: 83 U/L — SIGNIFICANT CHANGE UP (ref 40–120)
ALT FLD-CCNC: 25 U/L — SIGNIFICANT CHANGE UP (ref 12–78)
ANION GAP SERPL CALC-SCNC: 5 MMOL/L — SIGNIFICANT CHANGE UP (ref 5–17)
APPEARANCE UR: ABNORMAL
APTT BLD: 28.3 SEC — SIGNIFICANT CHANGE UP (ref 24.5–35.6)
AST SERPL-CCNC: 17 U/L — SIGNIFICANT CHANGE UP (ref 15–37)
BACTERIA # UR AUTO: ABNORMAL /HPF
BASOPHILS # BLD AUTO: 0.03 K/UL — SIGNIFICANT CHANGE UP (ref 0–0.2)
BASOPHILS NFR BLD AUTO: 0.3 % — SIGNIFICANT CHANGE UP (ref 0–2)
BILIRUB SERPL-MCNC: 0.5 MG/DL — SIGNIFICANT CHANGE UP (ref 0.2–1.2)
BILIRUB UR-MCNC: NEGATIVE — SIGNIFICANT CHANGE UP
BUN SERPL-MCNC: 6 MG/DL — LOW (ref 7–23)
CALCIUM SERPL-MCNC: 9.1 MG/DL — SIGNIFICANT CHANGE UP (ref 8.5–10.1)
CAST: 4 /LPF — SIGNIFICANT CHANGE UP (ref 0–4)
CHLORIDE SERPL-SCNC: 108 MMOL/L — SIGNIFICANT CHANGE UP (ref 96–108)
CO2 SERPL-SCNC: 25 MMOL/L — SIGNIFICANT CHANGE UP (ref 22–31)
COLOR SPEC: YELLOW — SIGNIFICANT CHANGE UP
CREAT SERPL-MCNC: 0.44 MG/DL — LOW (ref 0.5–1.3)
DIFF PNL FLD: NEGATIVE — SIGNIFICANT CHANGE UP
EGFR: 133 ML/MIN/1.73M2 — SIGNIFICANT CHANGE UP
EOSINOPHIL # BLD AUTO: 0.1 K/UL — SIGNIFICANT CHANGE UP (ref 0–0.5)
EOSINOPHIL NFR BLD AUTO: 1 % — SIGNIFICANT CHANGE UP (ref 0–6)
GLUCOSE SERPL-MCNC: 86 MG/DL — SIGNIFICANT CHANGE UP (ref 70–99)
GLUCOSE UR QL: NEGATIVE MG/DL — SIGNIFICANT CHANGE UP
HCT VFR BLD CALC: 31 % — LOW (ref 34.5–45)
HGB BLD-MCNC: 10.9 G/DL — LOW (ref 11.5–15.5)
IMM GRANULOCYTES NFR BLD AUTO: 0.7 % — SIGNIFICANT CHANGE UP (ref 0–0.9)
INR BLD: 1.01 RATIO — SIGNIFICANT CHANGE UP (ref 0.85–1.18)
KETONES UR-MCNC: NEGATIVE MG/DL — SIGNIFICANT CHANGE UP
LEUKOCYTE ESTERASE UR-ACNC: ABNORMAL
LYMPHOCYTES # BLD AUTO: 2.06 K/UL — SIGNIFICANT CHANGE UP (ref 1–3.3)
LYMPHOCYTES # BLD AUTO: 20.1 % — SIGNIFICANT CHANGE UP (ref 13–44)
MAGNESIUM SERPL-MCNC: 1.7 MG/DL — SIGNIFICANT CHANGE UP (ref 1.6–2.6)
MCHC RBC-ENTMCNC: 32.2 PG — SIGNIFICANT CHANGE UP (ref 27–34)
MCHC RBC-ENTMCNC: 35.2 GM/DL — SIGNIFICANT CHANGE UP (ref 32–36)
MCV RBC AUTO: 91.4 FL — SIGNIFICANT CHANGE UP (ref 80–100)
MONOCYTES # BLD AUTO: 0.71 K/UL — SIGNIFICANT CHANGE UP (ref 0–0.9)
MONOCYTES NFR BLD AUTO: 6.9 % — SIGNIFICANT CHANGE UP (ref 2–14)
NEUTROPHILS # BLD AUTO: 7.28 K/UL — SIGNIFICANT CHANGE UP (ref 1.8–7.4)
NEUTROPHILS NFR BLD AUTO: 71 % — SIGNIFICANT CHANGE UP (ref 43–77)
NITRITE UR-MCNC: NEGATIVE — SIGNIFICANT CHANGE UP
PH UR: 7 — SIGNIFICANT CHANGE UP (ref 5–8)
PLATELET # BLD AUTO: 324 K/UL — SIGNIFICANT CHANGE UP (ref 150–400)
POTASSIUM SERPL-MCNC: 4.2 MMOL/L — SIGNIFICANT CHANGE UP (ref 3.5–5.3)
POTASSIUM SERPL-SCNC: 4.2 MMOL/L — SIGNIFICANT CHANGE UP (ref 3.5–5.3)
PROT SERPL-MCNC: 7.2 GM/DL — SIGNIFICANT CHANGE UP (ref 6–8.3)
PROT UR-MCNC: NEGATIVE MG/DL — SIGNIFICANT CHANGE UP
PROTHROM AB SERPL-ACNC: 11.4 SEC — SIGNIFICANT CHANGE UP (ref 9.5–13)
RAPID RVP RESULT: SIGNIFICANT CHANGE UP
RBC # BLD: 3.39 M/UL — LOW (ref 3.8–5.2)
RBC # FLD: 13.3 % — SIGNIFICANT CHANGE UP (ref 10.3–14.5)
RBC CASTS # UR COMP ASSIST: 1 /HPF — SIGNIFICANT CHANGE UP (ref 0–4)
SARS-COV-2 RNA SPEC QL NAA+PROBE: SIGNIFICANT CHANGE UP
SODIUM SERPL-SCNC: 138 MMOL/L — SIGNIFICANT CHANGE UP (ref 135–145)
SP GR SPEC: 1.01 — SIGNIFICANT CHANGE UP (ref 1–1.03)
SQUAMOUS # UR AUTO: 14 /HPF — HIGH (ref 0–5)
TSH SERPL-MCNC: 1.14 UU/ML — SIGNIFICANT CHANGE UP (ref 0.34–4.82)
UROBILINOGEN FLD QL: 0.2 MG/DL — SIGNIFICANT CHANGE UP (ref 0.2–1)
WBC # BLD: 10.25 K/UL — SIGNIFICANT CHANGE UP (ref 3.8–10.5)
WBC # FLD AUTO: 10.25 K/UL — SIGNIFICANT CHANGE UP (ref 3.8–10.5)
WBC UR QL: 65 /HPF — HIGH (ref 0–5)

## 2023-10-04 PROCEDURE — 85025 COMPLETE CBC W/AUTO DIFF WBC: CPT

## 2023-10-04 PROCEDURE — 85730 THROMBOPLASTIN TIME PARTIAL: CPT

## 2023-10-04 PROCEDURE — 36415 COLL VENOUS BLD VENIPUNCTURE: CPT

## 2023-10-04 PROCEDURE — 93005 ELECTROCARDIOGRAM TRACING: CPT

## 2023-10-04 PROCEDURE — 85610 PROTHROMBIN TIME: CPT

## 2023-10-04 PROCEDURE — 99284 EMERGENCY DEPT VISIT MOD MDM: CPT

## 2023-10-04 PROCEDURE — 87086 URINE CULTURE/COLONY COUNT: CPT

## 2023-10-04 PROCEDURE — 0225U NFCT DS DNA&RNA 21 SARSCOV2: CPT

## 2023-10-04 PROCEDURE — 93010 ELECTROCARDIOGRAM REPORT: CPT

## 2023-10-04 PROCEDURE — 99283 EMERGENCY DEPT VISIT LOW MDM: CPT

## 2023-10-04 PROCEDURE — 84443 ASSAY THYROID STIM HORMONE: CPT

## 2023-10-04 PROCEDURE — 83735 ASSAY OF MAGNESIUM: CPT

## 2023-10-04 PROCEDURE — 80053 COMPREHEN METABOLIC PANEL: CPT

## 2023-10-04 PROCEDURE — 81001 URINALYSIS AUTO W/SCOPE: CPT

## 2023-10-04 RX ORDER — CEFUROXIME AXETIL 250 MG
1 TABLET ORAL
Qty: 14 | Refills: 0
Start: 2023-10-04 | End: 2023-10-10

## 2023-10-04 NOTE — ED PROVIDER NOTE - PROGRESS NOTE DETAILS
Initial /85  Pulse (!) 47  SpO2 95% There is no height or weight on file to calculate BMI. .         Charisma Day for attending Dr. Parra: Risks and benefits of CT imaging to r/o PE discussed with pt. Pt is in agreement to not due CT imaging at this time.

## 2023-10-04 NOTE — ED ADULT NURSE NOTE - OBJECTIVE STATEMENT
Pt is ambulatory to the ED AOx4 c/o wheezing, sob, and cough since Sunday. Pt states she tested (-) COVID/Flu. Pt 25 weeks pregnant.

## 2023-10-04 NOTE — ED PROVIDER NOTE - OBJECTIVE STATEMENT
27 y/o female 25 weeks pregnant with a PMHx of anxiety, depression, focal seizures, IBS presents to the ED c/o cough, SOB, runny nose and sore throat x days, worsening last night. Pt saw her PCP 3 days ago and was tested for flu and COVID, both resulting negative. Pt had LLE cramping 2 weeks ago, had sono and negative for DVT. Pt had LLE cramping again last night. Denies CP, vaginal bleeding, leakage of fluids. No other complaints at this time. 27 y/o female 25 weeks pregnant with a PMHx of anxiety, depression, focal seizures, IBS presents to the ED c/o cough, SOB, runny nose and sore throat x days, worsening last night. Pt saw her PCP 3 days ago and was tested for flu and COVID, both resulting negative. Pt had LLE cramping 2 weeks ago, had sono and negative for DVT. Pt had LLE cramping again last night which she has been having. Denies CP, vaginal bleeding, leakage of fluids. No other complaints at this time.

## 2023-10-04 NOTE — ED PROVIDER NOTE - PATIENT PORTAL LINK FT
You can access the FollowMyHealth Patient Portal offered by [facility] by registering at the following website: [email or web address]. By joining [employer]’s FollowMyHealth portal, you will also be able to view your health information using other applications (apps) compatible with our system.

## 2023-10-04 NOTE — ED ADULT TRIAGE NOTE - CHIEF COMPLAINT QUOTE
Pt is ambulatory to the ED AOx4 c/o wheezing, sob, and cough since Sunday. Pt states she tested (-) COVID/Flu. Pt 25 weeks pregnant. As per OB eval pt in ED and OB will come to eval fetal HR.  No fevers, vaginal bleeding or abd pain.

## 2023-10-04 NOTE — ED PROVIDER NOTE - CARE PLAN
1 Principal Discharge DX:	Cough  Secondary Diagnosis:	Pregnancy   Suturegard Body: The suture ends were repeatedly re-tightened and re-clamped to achieve the desired tissue expansion.

## 2023-10-04 NOTE — ED PROVIDER NOTE - CONSTITUTIONAL, MLM
Well appearing, awake, alert, oriented to person, place, time/situation and in no apparent distress. normal... The patient is a 57y Male complaining of slurred speech.

## 2023-10-04 NOTE — ED PROVIDER NOTE - CLINICAL SUMMARY MEDICAL DECISION MAKING FREE TEXT BOX
29 y/o female with cough, congestion, sore throat which makes pt SOB. Pt not hypoxic or tachypneic and resting comfortably. Symptoms most consistent with viral/URI etiology. Will check basic labs, reeval. 29 y/o female with cough, congestion, sore throat which makes pt SOB. Pt not hypoxic or tachypneic and resting comfortably. Symptoms most consistent with viral/URI etiology. Will check basic labs, reeval.    Fidel DO: FHR performed by ob; patient comfortable with d/c home; patient with wbcs in urine and no urinary complaints- reports that this is something that she has had in the past- will rx antibiotics- ceftin 500mg bid- to cover for lung and urine source- patient will call her ob/gyn first to discuss plan of care and comfortable with d/c home at this time.    Repeat vitals- patient is still not hypoxic at rest or on exertion and f/u with ob gyn as instructed.

## 2023-10-05 LAB
CULTURE RESULTS: SIGNIFICANT CHANGE UP
SPECIMEN SOURCE: SIGNIFICANT CHANGE UP

## 2023-11-08 ENCOUNTER — INPATIENT (INPATIENT)
Facility: HOSPITAL | Age: 28
LOS: 1 days | Discharge: HOME | DRG: 833 | End: 2023-11-10
Attending: OBSTETRICS & GYNECOLOGY | Admitting: OBSTETRICS & GYNECOLOGY
Payer: COMMERCIAL

## 2023-11-08 VITALS
SYSTOLIC BLOOD PRESSURE: 114 MMHG | DIASTOLIC BLOOD PRESSURE: 74 MMHG | RESPIRATION RATE: 20 BRPM | HEART RATE: 89 BPM | TEMPERATURE: 98 F

## 2023-11-08 DIAGNOSIS — O47.03 FALSE LABOR BEFORE 37 COMPLETED WEEKS OF GESTATION, THIRD TRIMESTER: ICD-10-CM

## 2023-11-08 DIAGNOSIS — Z98.890 OTHER SPECIFIED POSTPROCEDURAL STATES: Chronic | ICD-10-CM

## 2023-11-08 DIAGNOSIS — Z29.9 ENCOUNTER FOR PROPHYLACTIC MEASURES, UNSPECIFIED: ICD-10-CM

## 2023-11-08 DIAGNOSIS — F44.5 CONVERSION DISORDER WITH SEIZURES OR CONVULSIONS: ICD-10-CM

## 2023-11-08 DIAGNOSIS — O26.893 OTHER SPECIFIED PREGNANCY RELATED CONDITIONS, THIRD TRIMESTER: ICD-10-CM

## 2023-11-08 DIAGNOSIS — F41.9 ANXIETY DISORDER, UNSPECIFIED: ICD-10-CM

## 2023-11-08 DIAGNOSIS — Z90.49 ACQUIRED ABSENCE OF OTHER SPECIFIED PARTS OF DIGESTIVE TRACT: Chronic | ICD-10-CM

## 2023-11-08 DIAGNOSIS — Z3A.30 30 WEEKS GESTATION OF PREGNANCY: ICD-10-CM

## 2023-11-08 DIAGNOSIS — Z87.898 PERSONAL HISTORY OF OTHER SPECIFIED CONDITIONS: ICD-10-CM

## 2023-11-08 DIAGNOSIS — O40.3XX0 POLYHYDRAMNIOS, THIRD TRIMESTER, NOT APPLICABLE OR UNSPECIFIED: ICD-10-CM

## 2023-11-08 DIAGNOSIS — O26.899 OTHER SPECIFIED PREGNANCY RELATED CONDITIONS, UNSPECIFIED TRIMESTER: ICD-10-CM

## 2023-11-08 LAB
APPEARANCE UR: ABNORMAL
APPEARANCE UR: ABNORMAL
BACTERIA # UR AUTO: ABNORMAL /HPF
BACTERIA # UR AUTO: ABNORMAL /HPF
BASOPHILS # BLD AUTO: 0.04 K/UL — SIGNIFICANT CHANGE UP (ref 0–0.2)
BASOPHILS # BLD AUTO: 0.04 K/UL — SIGNIFICANT CHANGE UP (ref 0–0.2)
BASOPHILS NFR BLD AUTO: 0.3 % — SIGNIFICANT CHANGE UP (ref 0–2)
BASOPHILS NFR BLD AUTO: 0.3 % — SIGNIFICANT CHANGE UP (ref 0–2)
BILIRUB UR-MCNC: NEGATIVE — SIGNIFICANT CHANGE UP
BILIRUB UR-MCNC: NEGATIVE — SIGNIFICANT CHANGE UP
BLD GP AB SCN SERPL QL: SIGNIFICANT CHANGE UP
BLD GP AB SCN SERPL QL: SIGNIFICANT CHANGE UP
COLOR SPEC: YELLOW — SIGNIFICANT CHANGE UP
COLOR SPEC: YELLOW — SIGNIFICANT CHANGE UP
DIFF PNL FLD: NEGATIVE — SIGNIFICANT CHANGE UP
DIFF PNL FLD: NEGATIVE — SIGNIFICANT CHANGE UP
EOSINOPHIL # BLD AUTO: 0.16 K/UL — SIGNIFICANT CHANGE UP (ref 0–0.5)
EOSINOPHIL # BLD AUTO: 0.16 K/UL — SIGNIFICANT CHANGE UP (ref 0–0.5)
EOSINOPHIL NFR BLD AUTO: 1.2 % — SIGNIFICANT CHANGE UP (ref 0–6)
EOSINOPHIL NFR BLD AUTO: 1.2 % — SIGNIFICANT CHANGE UP (ref 0–6)
FIBRONECTIN FETAL SPEC QL: NEGATIVE — SIGNIFICANT CHANGE UP
FIBRONECTIN FETAL SPEC QL: NEGATIVE — SIGNIFICANT CHANGE UP
GLUCOSE UR QL: NEGATIVE MG/DL — SIGNIFICANT CHANGE UP
GLUCOSE UR QL: NEGATIVE MG/DL — SIGNIFICANT CHANGE UP
HCT VFR BLD CALC: 33.1 % — LOW (ref 34.5–45)
HCT VFR BLD CALC: 33.1 % — LOW (ref 34.5–45)
HGB BLD-MCNC: 11.4 G/DL — LOW (ref 11.5–15.5)
HGB BLD-MCNC: 11.4 G/DL — LOW (ref 11.5–15.5)
HIV 1 & 2 AB SERPL IA.RAPID: SIGNIFICANT CHANGE UP
HIV 1 & 2 AB SERPL IA.RAPID: SIGNIFICANT CHANGE UP
IMM GRANULOCYTES NFR BLD AUTO: 0.7 % — SIGNIFICANT CHANGE UP (ref 0–0.9)
IMM GRANULOCYTES NFR BLD AUTO: 0.7 % — SIGNIFICANT CHANGE UP (ref 0–0.9)
KETONES UR-MCNC: >=160 MG/DL
KETONES UR-MCNC: >=160 MG/DL
LEUKOCYTE ESTERASE UR-ACNC: ABNORMAL
LEUKOCYTE ESTERASE UR-ACNC: ABNORMAL
LYMPHOCYTES # BLD AUTO: 18.2 % — SIGNIFICANT CHANGE UP (ref 13–44)
LYMPHOCYTES # BLD AUTO: 18.2 % — SIGNIFICANT CHANGE UP (ref 13–44)
LYMPHOCYTES # BLD AUTO: 2.45 K/UL — SIGNIFICANT CHANGE UP (ref 1–3.3)
LYMPHOCYTES # BLD AUTO: 2.45 K/UL — SIGNIFICANT CHANGE UP (ref 1–3.3)
MCHC RBC-ENTMCNC: 31.1 PG — SIGNIFICANT CHANGE UP (ref 27–34)
MCHC RBC-ENTMCNC: 31.1 PG — SIGNIFICANT CHANGE UP (ref 27–34)
MCHC RBC-ENTMCNC: 34.4 GM/DL — SIGNIFICANT CHANGE UP (ref 32–36)
MCHC RBC-ENTMCNC: 34.4 GM/DL — SIGNIFICANT CHANGE UP (ref 32–36)
MCV RBC AUTO: 90.4 FL — SIGNIFICANT CHANGE UP (ref 80–100)
MCV RBC AUTO: 90.4 FL — SIGNIFICANT CHANGE UP (ref 80–100)
MONOCYTES # BLD AUTO: 0.79 K/UL — SIGNIFICANT CHANGE UP (ref 0–0.9)
MONOCYTES # BLD AUTO: 0.79 K/UL — SIGNIFICANT CHANGE UP (ref 0–0.9)
MONOCYTES NFR BLD AUTO: 5.9 % — SIGNIFICANT CHANGE UP (ref 2–14)
MONOCYTES NFR BLD AUTO: 5.9 % — SIGNIFICANT CHANGE UP (ref 2–14)
NEUTROPHILS # BLD AUTO: 9.96 K/UL — HIGH (ref 1.8–7.4)
NEUTROPHILS # BLD AUTO: 9.96 K/UL — HIGH (ref 1.8–7.4)
NEUTROPHILS NFR BLD AUTO: 73.7 % — SIGNIFICANT CHANGE UP (ref 43–77)
NEUTROPHILS NFR BLD AUTO: 73.7 % — SIGNIFICANT CHANGE UP (ref 43–77)
NITRITE UR-MCNC: NEGATIVE — SIGNIFICANT CHANGE UP
NITRITE UR-MCNC: NEGATIVE — SIGNIFICANT CHANGE UP
PH UR: 7.5 — SIGNIFICANT CHANGE UP (ref 5–8)
PH UR: 7.5 — SIGNIFICANT CHANGE UP (ref 5–8)
PLATELET # BLD AUTO: 330 K/UL — SIGNIFICANT CHANGE UP (ref 150–400)
PLATELET # BLD AUTO: 330 K/UL — SIGNIFICANT CHANGE UP (ref 150–400)
PROT UR-MCNC: NEGATIVE MG/DL — SIGNIFICANT CHANGE UP
PROT UR-MCNC: NEGATIVE MG/DL — SIGNIFICANT CHANGE UP
RBC # BLD: 3.66 M/UL — LOW (ref 3.8–5.2)
RBC # BLD: 3.66 M/UL — LOW (ref 3.8–5.2)
RBC # FLD: 12.9 % — SIGNIFICANT CHANGE UP (ref 10.3–14.5)
RBC # FLD: 12.9 % — SIGNIFICANT CHANGE UP (ref 10.3–14.5)
RBC CASTS # UR COMP ASSIST: 1 /HPF — SIGNIFICANT CHANGE UP (ref 0–4)
RBC CASTS # UR COMP ASSIST: 1 /HPF — SIGNIFICANT CHANGE UP (ref 0–4)
SP GR SPEC: 1.02 — SIGNIFICANT CHANGE UP (ref 1–1.03)
SP GR SPEC: 1.02 — SIGNIFICANT CHANGE UP (ref 1–1.03)
SQUAMOUS # UR AUTO: 7 /HPF — HIGH (ref 0–5)
SQUAMOUS # UR AUTO: 7 /HPF — HIGH (ref 0–5)
UROBILINOGEN FLD QL: 1 MG/DL — SIGNIFICANT CHANGE UP (ref 0.2–1)
UROBILINOGEN FLD QL: 1 MG/DL — SIGNIFICANT CHANGE UP (ref 0.2–1)
WBC # BLD: 13.49 K/UL — HIGH (ref 3.8–10.5)
WBC # BLD: 13.49 K/UL — HIGH (ref 3.8–10.5)
WBC # FLD AUTO: 13.49 K/UL — HIGH (ref 3.8–10.5)
WBC # FLD AUTO: 13.49 K/UL — HIGH (ref 3.8–10.5)
WBC UR QL: 2 /HPF — SIGNIFICANT CHANGE UP (ref 0–5)
WBC UR QL: 2 /HPF — SIGNIFICANT CHANGE UP (ref 0–5)

## 2023-11-08 PROCEDURE — 99255 IP/OBS CONSLTJ NEW/EST HI 80: CPT

## 2023-11-08 PROCEDURE — 76816 OB US FOLLOW-UP PER FETUS: CPT | Mod: 26

## 2023-11-08 PROCEDURE — 76817 TRANSVAGINAL US OBSTETRIC: CPT | Mod: 26

## 2023-11-08 PROCEDURE — 99254 IP/OBS CNSLTJ NEW/EST MOD 60: CPT | Mod: GC

## 2023-11-08 RX ORDER — SODIUM CHLORIDE 9 MG/ML
1000 INJECTION, SOLUTION INTRAVENOUS ONCE
Refills: 0 | Status: COMPLETED | OUTPATIENT
Start: 2023-11-08 | End: 2023-11-08

## 2023-11-08 RX ORDER — INDOMETHACIN 50 MG
25 CAPSULE ORAL EVERY 6 HOURS
Refills: 0 | Status: COMPLETED | OUTPATIENT
Start: 2023-11-08 | End: 2023-11-10

## 2023-11-08 RX ORDER — AMPICILLIN TRIHYDRATE 250 MG
1 CAPSULE ORAL EVERY 4 HOURS
Refills: 0 | Status: DISCONTINUED | OUTPATIENT
Start: 2023-11-08 | End: 2023-11-08

## 2023-11-08 RX ORDER — ACETAMINOPHEN 500 MG
1000 TABLET ORAL ONCE
Refills: 0 | Status: DISCONTINUED | OUTPATIENT
Start: 2023-11-08 | End: 2023-11-10

## 2023-11-08 RX ORDER — FOLIC ACID 0.8 MG
1 TABLET ORAL DAILY
Refills: 0 | Status: DISCONTINUED | OUTPATIENT
Start: 2023-11-08 | End: 2023-11-10

## 2023-11-08 RX ORDER — ONDANSETRON 8 MG/1
4 TABLET, FILM COATED ORAL ONCE
Refills: 0 | Status: COMPLETED | OUTPATIENT
Start: 2023-11-08 | End: 2023-11-08

## 2023-11-08 RX ORDER — AMPICILLIN TRIHYDRATE 250 MG
2 CAPSULE ORAL ONCE
Refills: 0 | Status: DISCONTINUED | OUTPATIENT
Start: 2023-11-08 | End: 2023-11-08

## 2023-11-08 RX ORDER — INDOMETHACIN 50 MG
50 CAPSULE ORAL ONCE
Refills: 0 | Status: COMPLETED | OUTPATIENT
Start: 2023-11-08 | End: 2023-11-08

## 2023-11-08 RX ADMIN — Medication 12 MILLIGRAM(S): at 16:22

## 2023-11-08 RX ADMIN — Medication 25 MILLIGRAM(S): at 22:25

## 2023-11-08 RX ADMIN — ONDANSETRON 4 MILLIGRAM(S): 8 TABLET, FILM COATED ORAL at 23:01

## 2023-11-08 RX ADMIN — Medication 50 MILLIGRAM(S): at 16:22

## 2023-11-08 RX ADMIN — SODIUM CHLORIDE 1000 MILLILITER(S): 9 INJECTION, SOLUTION INTRAVENOUS at 16:22

## 2023-11-08 NOTE — OB RN PATIENT PROFILE - HEALTH/HEALTHCARE ANXIETIES, PROFILE
Energy Level: Low
Post-Care Instructions: I reviewed with the patient in detail post-care instructions. Patient should stay away from the sun and wear sun protection until treated areas are fully healed. Patient will hold Alastin and Tretinoin x 4 days, restart HQ tomorrow.
Detail Level: Zone
Consent: Written consent obtained, risks reviewed including but not limited to crusting, scabbing, blistering, scarring, darker or lighter pigmentary change, incidental hair removal, bruising, and/or incomplete removal.
Post-Procedure Care: C+E Ferrulic, Trio, SPF applied. Post care reviewed with patient.
Laser Type: Clear and Brilliant 1440nm
Price (Use Numbers Only, No Special Characters Or $): 0
Length Of Topical Anesthesia Application (Optional): 30 minutes
Total Pulses: 4 passes on entire face, 2 passes on upper lip
Topical Anesthesia?: 30% lidocaine
none

## 2023-11-08 NOTE — CONSULT NOTE ADULT - ATTENDING COMMENTS
MFM -  at 30w3d presents for evaluation of abdominal pain which started at work this afternoon.  No vaginal bleeding or leakage of fluid.  Confirms fetal movement. Upon presentation, patient noted to be faisal q 2 minuted on tocometer. FHRT reactive. VE FT/50/-3.  Patient reports increasing pain with contractions. Bedside ultrasound with AGA fetus, mild polyhydramnios, and BPP 8/8. CL 2.9 cm.  Placenta sonographically normal. Patient advised of the results of evaluation. CBC, UA, FFN, culture pending.  Given regular contractions and cervical exam, recommend betamethasone and indocin for tocolysis.  Reviewed etiology of polyhydramnios and patient reports normal GCT. Patient is a candidate for MGSO4 for neuroprotection if further cervical change.  Would also initiate ampicillin for GBS prophylaxis is further change. Patient and  voiced understanding of the above counseling and management and are in agreement.   Jessica Becerra MD  Maternal Fetal Medicine

## 2023-11-08 NOTE — CHART NOTE - NSCHARTNOTEFT_GEN_A_CORE
Patient reports increasingly pain in her groin and vaginal pressure.   SVE 0.5/20/-3  FHT: 140 bpm, moderate variability, - accels, + variable decels   [place]: q2min    Patient noted to have an unchanged cervical exam.   Patient reports her pain is worse than when she came.  Will consult [facility].     D/w Dr. Romero Patient reports increasing pain in her groin and vaginal pressure.   SVE 0.5/20/-3  FHT: 140 bpm, moderate variability, - accels, + audible decels   [place]: q2min    Patient reported not feeling well and proceeded to throw up.   Patient noted to have an unchanged cervical exam.   Patient reports her pain is worse than when she came.  Will consult [facility].     D/w Dr. Romero

## 2023-11-08 NOTE — OB RN PATIENT PROFILE - NSTOBACCONEVERSMOKERY/N_GEN_A
Pt arrives with mother who was concerned about an elevated BS.  After BS test of 106, mother no longer wants to be seen by a provider.  Informed refusal paperwork signed.   No

## 2023-11-08 NOTE — OB PROVIDER H&P - ATTENDING COMMENTS
I personally saw and evaluated patient and agree with above assessment and plan.  Pt is a 28y year old  at 30 weeks 3 days GA by consistent  with w sono presents to L&D for cramping abdominal pain which started at 11:45am this afternoon, pt reports going to the nurse's office and was told her blood pressure/ heart rate was a little bit high. Pt reports pain radiating to the back and some pelvic discomfort as well. Pt reports pain has somewhat improved. No leakage of fluid, bleeding PV. Pt endorses good fetal movement. Pt was for evaluation of pre-term labor.  Pt's OB provider is Dr Parsons.    VE: 0.5/20/-2  toco: Q 2 min  FHR: baseline 140, +accel, no decel appropriate for gestational age    Plan:  MFM consult appreciated.  Will start indocin   BMZ for FLM  GBS swab done   CORDELL today was 26; will repeat tomorrow  plan d/w pt and her partner and all questions answered.    LUISANA Parks (on call OB hospitalist)

## 2023-11-08 NOTE — CONSULT NOTE ADULT - ASSESSMENT
A 28year old  at 30w3d by ETD admitted for management of  contractions and dilation    discussed with attending Dr Becerra at [facility]

## 2023-11-08 NOTE — CONSULT NOTE ADULT - PROBLEM SELECTOR RECOMMENDATION 2
-indomethacin 50 loading, followed by 25; to discontinue after betamethasone second dose  -betamethasone x2  -concern for  labor; 0.5cm/20/-2 station on   -GBS, BV, GCCT pending  -Sono : vtx, posterior placenta, 2.9 cervical length, 1950g, polyhydramnios, BPP   -FFN pending  - IV fluid bolus, improving in discomfort

## 2023-11-08 NOTE — OB RN PATIENT PROFILE - NSICDXPASTMEDICALHX_GEN_ALL_CORE_FT
PAST MEDICAL HISTORY:  Anxiety     Depression     History of psychogenic nonepileptic seizure     IBS (irritable bowel syndrome)

## 2023-11-08 NOTE — CONSULT NOTE ADULT - PROBLEM SELECTOR RECOMMENDATION 9
-betamethasone for fetal lung maturity x2 doses  -Sono : 1950g, vtx, posterior placenta  -NST reactive, continue in setting of painful ctx and concern for  labor  -GBS swab pending  -prenatal records pending fax by Dr Parsons, primary OB

## 2023-11-08 NOTE — OB PROVIDER H&P - HISTORY OF PRESENT ILLNESS
28y year old  at 30 weeks 3 days GA by consistent  with chad burgess presents to L&D for cramping abdominal pain which started at 11:45am this afternoon, pt reports going to the nurse's office and was told her blood pressure/ heart rate was a little bit high. Pt reports pain radiating to the back and some pelvic discomfort as well. Pt reports pain has somewhat improved. No leakage of fluid, bleeding PV. Pt endorses good fetal movement. Pt was BIBA for evaluation of pre-term labor  JOSE LUIS: 23    Prenatal course complicated- IVF pregnancy, maternal seizures (not any medication- seizure free since )        OBHx: Non significant  GYNHx:  -fibroids, -ovarian cysts, denies STD hx   PMH: Anxiety, Depression, focal seizure disorder not on any medication ( seizure free since )   PSH: Umbilical hernia repair, cholecystectomy  SH: Denies EtOH, tobacco and illicit drug use during this pregnancy, Feels safe at home   Allergies: NKDA  Meds: PNV     28y year old  at 30 weeks 3 days GA by consistent  with chad burgess presents to L&D for cramping abdominal pain which started at 11:45am this afternoon, pt reports going to the nurse's office and was told her blood pressure/ heart rate was a little bit high. Pt reports pain radiating to the back and some pelvic discomfort as well. Pt reports pain has somewhat improved. No leakage of fluid, bleeding PV. Pt endorses good fetal movement. Pt was BIBA for evaluation of pre-term labor  JOSE LUIS: 23    Prenatal course complicated- IVF pregnancy,  seizures ? psychogenic seizures (not any medication- last seizure )        OBHx: Non significant  GYNHx:  -fibroids, -ovarian cysts, denies STD hx   PMH: Anxiety, Depression, focal seizure disorder not on any medication ( seizure free since )   PSH: Umbilical hernia repair, cholecystectomy  SH: Denies EtOH, tobacco and illicit drug use during this pregnancy, Feels safe at home   Allergies: NKDA  Meds: PNV

## 2023-11-08 NOTE — CONSULT NOTE ADULT - SUBJECTIVE AND OBJECTIVE BOX
SANDRO MIKE  A 28year old  at 30w3d by ETD  presenting to L&D for cramping abdominal pain which started at 11:45am this afternoon, pt reports going to the nurse's office and was told her blood pressure/ heart rate was a little bit high. Pt reports pain radiating to the back and some pelvic discomfort as well. Pt reports pain has somewhat improved. No leakage of fluid, bleeding PV. Pt endorses good fetal movement. Pt was BIBA for evaluation of pre-term labor      JOSE LUIS: 24  LMP: 23    Prenatal course complicated-   IVF pregnancy  maternal seizures (not any medication- seizure free since )   anxiety, depression - pt electing for primary  section with Dr Parsons, primary OB    OBHx: Non significant  GYNHx:  -fibroids, -ovarian cysts, denies STD hx   PMH: Anxiety, Depression, psychogenic nonepileptic seizures ( seizure free since )   PSH: Umbilical hernia repair, cholecystectomy  SH: Denies EtOH, tobacco and illicit drug use during this pregnancy, Feels safe at home   Allergies: NKDA  Meds: PNV    Vital Signs:  Vital Signs Last 24 Hrs  T(C): 36.7 (2023 13:36), Max: 36.7 (2023 13:36)  T(F): 98.1 (2023 13:36), Max: 98.1 (2023 13:36)  HR: 89 (2023 13:41) (89 - 89)  BP: 114/74 (2023 13:41) (114/74 - 114/74)  RR: 20 (2023 13:36) (20 - 20)    Parameters below as of 2023 13:36  Patient On (Oxygen Delivery Method): room air    Gen: NAD  Pulm: CTABL  CVR: RRR   Abd: Gravid, + BS   SVE:  0.5cm/ soft/-2 station   Tracin bpm, moderate variability,  accels, - decels  [place]: irreg ctx      Labs:                              11.4   13.49 )-----------( 330      ( 2023 13:48 )             33.1               Radiology:    MEDICATIONS  (STANDING):  betamethasone Injectable 12 milliGRAM(s) IntraMuscular every 24 hours  folic acid 1 milliGRAM(s) Oral daily  indomethacin 50 milliGRAM(s) Oral once  indomethacin 25 milliGRAM(s) Oral every 6 hours  lactated ringers Bolus 1000 milliLiter(s) IV Bolus once  prenatal multivitamin 1 Tablet(s) Oral daily    MEDICATIONS  (PRN):

## 2023-11-08 NOTE — OB RN PATIENT PROFILE - NSSDOHPUBLICBEN_OBGYN_A_OB
Called patient and left voice mail informing of no show appt 9/10/2019 and to return call to reschedule. I do not need help with these

## 2023-11-08 NOTE — OB RN TRIAGE NOTE - FALL HARM RISK - UNIVERSAL INTERVENTIONS
Bed in lowest position, wheels locked, appropriate side rails in place/Call bell, personal items and telephone in reach/Instruct patient to call for assistance before getting out of bed or chair/Non-slip footwear when patient is out of bed/[place] to call system/Physically safe environment - no spills, clutter or unnecessary equipment/Purposeful Proactive Rounding/Room/bathroom lighting operational, light cord in reach

## 2023-11-08 NOTE — OB RN PATIENT PROFILE - BP NONINVASIVE DIASTOLIC (MM HG)
If you are a smoker, it is important for your health to stop smoking. Please be aware that second hand smoke is also harmful. 71

## 2023-11-08 NOTE — OB PROVIDER H&P - NSICDXPASTMEDICALHX_GEN_ALL_CORE_FT
PAST MEDICAL HISTORY:  Anxiety     Depression     Focal seizures     IBS (irritable bowel syndrome)      PAST MEDICAL HISTORY:  Anxiety     Depression     History of psychogenic nonepileptic seizure     IBS (irritable bowel syndrome)

## 2023-11-08 NOTE — CONSULT NOTE PEDS - ASSESSMENT
I met with Ms. Hawkins and we reviewed the followin. The NICU team will be present at her delivery and will immediately assess and care for her infant.    2. The infant will likely require respiratory support, most commonly in the form of nasal CPAP. The outcomes improve after  steroids. Some infants at this gestational age may require intubation and mechanical ventilation.    3. Depending on the clinical status of the infant, enteral feedings will likely not be started immediately. IV nutrition in the form of TPN would be provided via umbilical line or PICC until the infant is able to tolerate full enteral feedings. Due to immature suck/swallow, the infant may require an orogastric tube once feeds are initiated. The infant is also at risk for hypoglycemia.    4. Discussed the benefits of breastfeeding in  infants and encouraged mother to pump following delivery.    5. Due to prematurity, the infant will be at increased risk for infection and would likely be started on antibiotics after birth. The infant will be screened for infections following delivery and may require other courses of antibiotics during their hospital course if an infection were suspected. If the infant shows signs and symptoms of feeding intolerance, feedings may be held, and the infant may require evaluation for intestinal infection (necrotizing enterocolitis).    6. Risk of intraventricular hemorrhage (IVH) and possible consequences discussed.    7. Retinopathy of prematurity (ROP) risk discussed along with close follow up with ophthalmologist. If ROP is significant, medical or surgical treatment may be required.    8. Thermoregulation issues and need to be in an isolette with slow weaning to a crib discussed.    9. Length of stay is highly variable, but given the infant’s size gestational age, average stay is approximately 6 weeks. Discussed discharged criteria.    Ms. Hawkins  had the chance to ask any questions and was encouraged to contact the NICU at that time if additional questions arise.    Thank you for the opportunity to participate in the care of this patient and please inform us of any changes in her status.

## 2023-11-08 NOTE — CONSULT NOTE PEDS - SUBJECTIVE AND OBJECTIVE BOX
28y year old  at 30 weeks 3 days GA by consistent  with w aditya presents to L&D for cramping abdominal pain which started at 11:45am this afternoon, without leakage of fluid or bleeding PV. Pt was evaluated for pre-term labor, FHR- cat 1, toco- regular contractions. Physical exam remarkable for 0.5cm dilated cervix with effacement and -1/2 station. Fetal fibronectin, GC NAAT, Group B strep and bacterial vaginosis sent. Primary OB (Dr. Terrell) office is being reached out for prenatal labs.   Nitrazine negative. S/p Betamethazone x 1 dose, on Ampicillin, Indometacin for tocolysis, ondasetron for nausea. Patient continues to have cramping abdominal pain, consistent with contractions on toco.      Age:28y    LOS:    Vital Signs:  T(C): 36.7 ( @ 19:19), Max: 36.8 ( @ 16:44)  HR: 114 ( @ 19:21) (89 - 131)  BP: 112/71 ( @ 16:45) (112/71 - 114/74)  RR: 18 ( @ 19:19) (17 - 20)  SpO2: 99% ( @ 19:21) (99% - 99%)    acetaminophen   IVPB .. 1000 milliGRAM(s) once  betamethasone Injectable 12 milliGRAM(s) every 24 hours  folic acid 1 milliGRAM(s) daily  indomethacin 25 milliGRAM(s) every 6 hours  ondansetron Injectable 4 milliGRAM(s) once  prenatal multivitamin 1 Tablet(s) daily      LABS:                                   11.4   13.49 )-----------( 330             [ @ 13:48]                  33.1  S 73.7%  B 0%  [employer] 0%  Myelo 0%  Promyelo 0%  Blasts 0%  Lymph 18.2%  Mono 5.9%  Eos 1.2%  Baso 0.3%  Retic 0%    POCT Glucose:   TFT's [10-]    TSH: 1.14 T4: N/A fT4: N/A

## 2023-11-08 NOTE — OB RN PATIENT PROFILE - FALL HARM RISK - UNIVERSAL INTERVENTIONS
Bed in lowest position, wheels locked, appropriate side rails in place/Call bell, personal items and telephone in reach/Instruct patient to call for assistance before getting out of bed or chair/Non-slip footwear when patient is out of bed/[place] to call system/Physically safe environment - no spills, clutter or unnecessary equipment/Purposeful Proactive Rounding/Room/bathroom lighting operational, light cord in reach

## 2023-11-08 NOTE — CHART NOTE - NSCHARTNOTEFT_GEN_A_CORE
MFM Fellow Note    I discussed the patient with the oncall resident and the MFM Attending Dr Hinson. Patient reportedly in significant pain from contractions.    FHT baseline 120 moderate variability positive accels  contractions q2min    at 1850 one period of a heart rate noted to be at 100 but not continuous with prior fetal heart rate tracing.     RNs reported to be at bedside and hearing the FHT as consistent with fetal decel.    At this time recommend  - ofirmev for pain, if not resolved may consider 4mg morphine IV.  - 02 sat continuous on mother and continuous FHT to assess whether there are fetal decelerations vs it is maternal heart rate  - No magnesium for neuroprotection at this time, however if further tracing suggests recurrent fetal decelerations or decreased variability and loss of accelerations may be reconsidered.   - Urinalysis / Urine Culture as part of  labor evaluation.     Flynn Rowe DO   MFM Fellow MFM Fellow Note    I discussed the patient with the oncall resident and the MFM Attending Dr Hinson. Patient reportedly in significant pain from contractions.    FHT baseline 120 moderate variability positive accels  contractions q2min    at 1850 one period of a heart rate noted to be at 100 but not continuous with prior fetal heart rate tracing.     RNs reported to be at bedside and hearing the FHT as consistent with fetal decel.    At this time recommend  - ofirmev for pain, if not resolved may consider 4mg morphine IV.  - 02 sat continuous on mother and continuous FHT to assess whether there are fetal decelerations vs it is maternal heart rate  - No magnesium for neuroprotection at this time, however if further tracing suggests recurrent fetal decelerations or decreased variability and loss of accelerations may be reconsidered.   - Urinalysis / Urine Culture as part of  labor evaluation.     Flynn Rowe DO   MFM Fellow    Agree with assessment and plan.   Vikki Hinson MD

## 2023-11-08 NOTE — OB PROVIDER H&P - ASSESSMENT
28y year old  at 30 weeks 3 days GA by consistent  with w aditya presents to L&D for cramping abdominal pain which started at 11:45am this afternoon, without leakage of fluid or bleeding PV. Pt was evaluated for pre-term labor, FHR- cat 1, toco- regular contractions. Physical exam remarkable for 0.5cm dilated cervix with effacement and -1/2 station. Fetal fibronectin, GC NAAT, Group B strep and bacterial vaginosis sent. Dr. Webber to be contacted for pt records, Nitrazine negative. Pt to be admitted for m/m of  labor.     -Admit to L&D  -IM Bethamethasone  -Consent  -Admission labs  -NPO, except ice chips   -IV fluids  -Continuous toco and fetal monitoring   -GBS swab sent  -Analgesia:   -DVT ppx: Ambulate and SCD's while in bed     Discussed with   28y year old  at 30 weeks 3 days GA by consistent  with w aditya presents to L&D for cramping abdominal pain which started at 11:45am this afternoon, without leakage of fluid or bleeding PV. Pt was evaluated for pre-term labor, FHR- cat 1, toco- regular contractions. Physical exam remarkable for 0.5cm dilated cervix with effacement and -1/2 station. Fetal fibronectin, GC NAAT, Group B strep and bacterial vaginosis sent. Dr. Webber to be contacted for pt records, Nitrazine negative. Pt to be admitted for m/m of  labor.     -Admit to L&D  - [facility] consult   -IM Bethamethasone  - IV Ampicillin  - Indomethacin PO  -Consent  -Admission labs  -NPO, except ice chips   -IV fluids  -Continuous toco and fetal monitoring   -GBS swab sent    Discussed with Dr. Martinez

## 2023-11-08 NOTE — OB PROVIDER H&P - NSHPPHYSICALEXAM_GEN_ALL_CORE
24w6d    Pt was having spotting. She had labs done 5/5/20.  She states that you were going to do another test (ultrsound or cervix check) if all the tests were negative.    Spotting did happen again when she was working this last weekend.    Nataly STEPHENS R.N.         T(C): 36.7 (23 @ 13:36), Max: 36.7 (23 @ 13:36)  HR: 89 (23 @ 13:41) (89 - 89)  BP: 114/74 (23 @ 13:41) (114/74 - 114/74)  RR: 20 (23 @ 13:36) (20 - 20)  Gen: NAD  Pulm: CTABL  CVR: RRR   Abd: Gravid, + BS   SVE:  0.5cm/ soft/-2 station   Tracin bpm, moderate variability,  accels, - decels  [place]: regular contractions  Bedside sono: T(C): 36.7 (23 @ 13:36), Max: 36.7 (23 @ 13:36)  HR: 89 (23 @ 13:41) (89 - 89)  BP: 114/74 (23 @ 13:41) (114/74 - 114/74)  RR: 20 (23 @ 13:36) (20 - 20)  Gen: NAD  Pulm: CTABL  CVR: RRR   Abd: Gravid, + BS   SVE:  0.5cm/ 20/-2 station   Tracin bpm, moderate variability,  accels, - decels  [place]: regular contractions  Bedside sono:

## 2023-11-09 ENCOUNTER — TRANSCRIPTION ENCOUNTER (OUTPATIENT)
Age: 28
End: 2023-11-09

## 2023-11-09 DIAGNOSIS — R82.4 ACETONURIA: ICD-10-CM

## 2023-11-09 DIAGNOSIS — Z87.442 PERSONAL HISTORY OF URINARY CALCULI: ICD-10-CM

## 2023-11-09 LAB
ALBUMIN SERPL ELPH-MCNC: 3.4 G/DL — SIGNIFICANT CHANGE UP (ref 3.3–5.2)
ALBUMIN SERPL ELPH-MCNC: 3.4 G/DL — SIGNIFICANT CHANGE UP (ref 3.3–5.2)
ALP SERPL-CCNC: 98 U/L — SIGNIFICANT CHANGE UP (ref 40–120)
ALP SERPL-CCNC: 98 U/L — SIGNIFICANT CHANGE UP (ref 40–120)
ALT FLD-CCNC: 15 U/L — SIGNIFICANT CHANGE UP
ALT FLD-CCNC: 15 U/L — SIGNIFICANT CHANGE UP
AMYLASE P1 CFR SERPL: 51 U/L — SIGNIFICANT CHANGE UP (ref 36–128)
AMYLASE P1 CFR SERPL: 51 U/L — SIGNIFICANT CHANGE UP (ref 36–128)
ANION GAP SERPL CALC-SCNC: 13 MMOL/L — SIGNIFICANT CHANGE UP (ref 5–17)
ANION GAP SERPL CALC-SCNC: 13 MMOL/L — SIGNIFICANT CHANGE UP (ref 5–17)
APPEARANCE UR: CLEAR — SIGNIFICANT CHANGE UP
APPEARANCE UR: CLEAR — SIGNIFICANT CHANGE UP
AST SERPL-CCNC: 18 U/L — SIGNIFICANT CHANGE UP
AST SERPL-CCNC: 18 U/L — SIGNIFICANT CHANGE UP
BILIRUB SERPL-MCNC: 0.6 MG/DL — SIGNIFICANT CHANGE UP (ref 0.4–2)
BILIRUB SERPL-MCNC: 0.6 MG/DL — SIGNIFICANT CHANGE UP (ref 0.4–2)
BILIRUB UR-MCNC: NEGATIVE — SIGNIFICANT CHANGE UP
BILIRUB UR-MCNC: NEGATIVE — SIGNIFICANT CHANGE UP
BUN SERPL-MCNC: 5 MG/DL — LOW (ref 8–20)
BUN SERPL-MCNC: 5 MG/DL — LOW (ref 8–20)
C TRACH RRNA SPEC QL NAA+PROBE: SIGNIFICANT CHANGE UP
C TRACH RRNA SPEC QL NAA+PROBE: SIGNIFICANT CHANGE UP
CALCIUM SERPL-MCNC: 9 MG/DL — SIGNIFICANT CHANGE UP (ref 8.4–10.5)
CALCIUM SERPL-MCNC: 9 MG/DL — SIGNIFICANT CHANGE UP (ref 8.4–10.5)
CHLORIDE SERPL-SCNC: 102 MMOL/L — SIGNIFICANT CHANGE UP (ref 96–108)
CHLORIDE SERPL-SCNC: 102 MMOL/L — SIGNIFICANT CHANGE UP (ref 96–108)
CO2 SERPL-SCNC: 21 MMOL/L — LOW (ref 22–29)
CO2 SERPL-SCNC: 21 MMOL/L — LOW (ref 22–29)
COLOR SPEC: YELLOW — SIGNIFICANT CHANGE UP
COLOR SPEC: YELLOW — SIGNIFICANT CHANGE UP
CREAT SERPL-MCNC: 0.39 MG/DL — LOW (ref 0.5–1.3)
CREAT SERPL-MCNC: 0.39 MG/DL — LOW (ref 0.5–1.3)
DIFF PNL FLD: NEGATIVE — SIGNIFICANT CHANGE UP
DIFF PNL FLD: NEGATIVE — SIGNIFICANT CHANGE UP
EGFR: 137 ML/MIN/1.73M2 — SIGNIFICANT CHANGE UP
EGFR: 137 ML/MIN/1.73M2 — SIGNIFICANT CHANGE UP
GLUCOSE SERPL-MCNC: 119 MG/DL — HIGH (ref 70–99)
GLUCOSE SERPL-MCNC: 119 MG/DL — HIGH (ref 70–99)
GLUCOSE UR QL: 100 MG/DL
GLUCOSE UR QL: 100 MG/DL
HIV 1+2 AB+HIV1 P24 AG SERPL QL IA: SIGNIFICANT CHANGE UP
HIV 1+2 AB+HIV1 P24 AG SERPL QL IA: SIGNIFICANT CHANGE UP
KETONES UR-MCNC: ABNORMAL MG/DL
KETONES UR-MCNC: ABNORMAL MG/DL
LEUKOCYTE ESTERASE UR-ACNC: NEGATIVE — SIGNIFICANT CHANGE UP
LEUKOCYTE ESTERASE UR-ACNC: NEGATIVE — SIGNIFICANT CHANGE UP
LIDOCAIN IGE QN: 14 U/L — LOW (ref 22–51)
LIDOCAIN IGE QN: 14 U/L — LOW (ref 22–51)
N GONORRHOEA RRNA SPEC QL NAA+PROBE: SIGNIFICANT CHANGE UP
N GONORRHOEA RRNA SPEC QL NAA+PROBE: SIGNIFICANT CHANGE UP
NITRITE UR-MCNC: NEGATIVE — SIGNIFICANT CHANGE UP
NITRITE UR-MCNC: NEGATIVE — SIGNIFICANT CHANGE UP
PH UR: 7 — SIGNIFICANT CHANGE UP (ref 5–8)
PH UR: 7 — SIGNIFICANT CHANGE UP (ref 5–8)
POTASSIUM SERPL-MCNC: 4 MMOL/L — SIGNIFICANT CHANGE UP (ref 3.5–5.3)
POTASSIUM SERPL-MCNC: 4 MMOL/L — SIGNIFICANT CHANGE UP (ref 3.5–5.3)
POTASSIUM SERPL-SCNC: 4 MMOL/L — SIGNIFICANT CHANGE UP (ref 3.5–5.3)
POTASSIUM SERPL-SCNC: 4 MMOL/L — SIGNIFICANT CHANGE UP (ref 3.5–5.3)
PROT SERPL-MCNC: 6.4 G/DL — LOW (ref 6.6–8.7)
PROT SERPL-MCNC: 6.4 G/DL — LOW (ref 6.6–8.7)
PROT UR-MCNC: NEGATIVE MG/DL — SIGNIFICANT CHANGE UP
PROT UR-MCNC: NEGATIVE MG/DL — SIGNIFICANT CHANGE UP
SODIUM SERPL-SCNC: 135 MMOL/L — SIGNIFICANT CHANGE UP (ref 135–145)
SODIUM SERPL-SCNC: 135 MMOL/L — SIGNIFICANT CHANGE UP (ref 135–145)
SP GR SPEC: 1.01 — SIGNIFICANT CHANGE UP (ref 1–1.03)
SP GR SPEC: 1.01 — SIGNIFICANT CHANGE UP (ref 1–1.03)
SPECIMEN SOURCE: SIGNIFICANT CHANGE UP
SPECIMEN SOURCE: SIGNIFICANT CHANGE UP
T PALLIDUM AB TITR SER: NEGATIVE — SIGNIFICANT CHANGE UP
T PALLIDUM AB TITR SER: NEGATIVE — SIGNIFICANT CHANGE UP
UROBILINOGEN FLD QL: 1 MG/DL — SIGNIFICANT CHANGE UP (ref 0.2–1)
UROBILINOGEN FLD QL: 1 MG/DL — SIGNIFICANT CHANGE UP (ref 0.2–1)

## 2023-11-09 PROCEDURE — 99232 SBSQ HOSP IP/OBS MODERATE 35: CPT | Mod: GC

## 2023-11-09 RX ORDER — CITRIC ACID/SODIUM CITRATE 300-500 MG
30 SOLUTION, ORAL ORAL ONCE
Refills: 0 | Status: DISCONTINUED | OUTPATIENT
Start: 2023-11-09 | End: 2023-11-10

## 2023-11-09 RX ORDER — FAMOTIDINE 10 MG/ML
20 INJECTION INTRAVENOUS ONCE
Refills: 0 | Status: COMPLETED | OUTPATIENT
Start: 2023-11-09 | End: 2023-11-09

## 2023-11-09 RX ORDER — DOXYLAMINE SUCCINATE AND PYRIDOXINE HYDROCHLORIDE, DELAYED RELEASE TABLETS 10 MG/10 MG 10; 10 MG/1; MG/1
1 TABLET, DELAYED RELEASE ORAL
Refills: 0 | Status: DISCONTINUED | OUTPATIENT
Start: 2023-11-09 | End: 2023-11-10

## 2023-11-09 RX ORDER — SODIUM CHLORIDE 9 MG/ML
1000 INJECTION, SOLUTION INTRAVENOUS
Refills: 0 | Status: COMPLETED | OUTPATIENT
Start: 2023-11-09 | End: 2023-11-09

## 2023-11-09 RX ADMIN — DOXYLAMINE SUCCINATE AND PYRIDOXINE HYDROCHLORIDE, DELAYED RELEASE TABLETS 10 MG/10 MG 1 TABLET(S): 10; 10 TABLET, DELAYED RELEASE ORAL at 18:21

## 2023-11-09 RX ADMIN — Medication 12 MILLIGRAM(S): at 16:25

## 2023-11-09 RX ADMIN — DOXYLAMINE SUCCINATE AND PYRIDOXINE HYDROCHLORIDE, DELAYED RELEASE TABLETS 10 MG/10 MG 1 TABLET(S): 10; 10 TABLET, DELAYED RELEASE ORAL at 12:14

## 2023-11-09 RX ADMIN — SODIUM CHLORIDE 125 MILLILITER(S): 9 INJECTION, SOLUTION INTRAVENOUS at 08:03

## 2023-11-09 RX ADMIN — Medication 1 TABLET(S): at 12:14

## 2023-11-09 RX ADMIN — Medication 25 MILLIGRAM(S): at 21:37

## 2023-11-09 RX ADMIN — FAMOTIDINE 20 MILLIGRAM(S): 10 INJECTION INTRAVENOUS at 11:20

## 2023-11-09 RX ADMIN — Medication 25 MILLIGRAM(S): at 16:45

## 2023-11-09 RX ADMIN — Medication 25 MILLIGRAM(S): at 16:25

## 2023-11-09 RX ADMIN — Medication 25 MILLIGRAM(S): at 04:30

## 2023-11-09 RX ADMIN — Medication 25 MILLIGRAM(S): at 11:20

## 2023-11-09 RX ADMIN — Medication 25 MILLIGRAM(S): at 11:30

## 2023-11-09 RX ADMIN — Medication 1 MILLIGRAM(S): at 12:14

## 2023-11-09 NOTE — DISCHARGE NOTE ANTEPARTUM - CARE PLAN
Principal Discharge DX:	30 weeks gestation of pregnancy  Assessment and plan of treatment:	Patient should transition to regular activity level for 3rd trimester. Resume regular diet. Patient should follow up with her OB for routine and hospital follow-up within 1 week. Patient should call her doctor sooner if she develops fever or uncontrolled vaginal bleeding, worsening cramping, leaking of fluid. If you have additional concerns, please inform your provider.  Secondary Diagnosis:	 uterine contractions, antepartum, third trimester  Assessment and plan of treatment:	Patient should transition to regular activity level for 3rd trimester. Resume regular diet. Patient should follow up with her OB for routine and hospital follow-up within 1 week. Patient should call her doctor sooner if she develops fever or uncontrolled vaginal bleeding, worsening cramping, leaking of fluid. If you have additional concerns, please inform your provider.  Secondary Diagnosis:	Polyhydramnios in third trimester  Assessment and plan of treatment:	Not requiring acute intervention; may likely require more frequent  surveillance; please consult with your primary OB Dr. Parsnos at follow up for scheduling and monitoring of polyhdramnios   1

## 2023-11-09 NOTE — DISCHARGE NOTE ANTEPARTUM - HOSPITAL COURSE
Patient admitted to hospital  to  contractions and concern for  labor. She received  steroids for fetal lung maturity and indomethacin for tocolysis. Her NSTs were reactive upon fetal monitoring and contractions became irregular. She was found to have polyhydramnios and lab values signifying dehydration. She was transferred to antental / maternity unit for further monitoring. Labs and Vitals WNL upon discharge.

## 2023-11-09 NOTE — DISCHARGE NOTE ANTEPARTUM - PLAN OF CARE
Patient should transition to regular activity level for 3rd trimester. Resume regular diet. Patient should follow up with her OB for routine and hospital follow-up within 1 week. Patient should call her doctor sooner if she develops fever or uncontrolled vaginal bleeding, worsening cramping, leaking of fluid. If you have additional concerns, please inform your provider. Not requiring acute intervention; may likely require more frequent  surveillance; please consult with your primary OB Dr. Parsons at follow up for scheduling and monitoring of polyhdramnios

## 2023-11-09 NOTE — DISCHARGE NOTE ANTEPARTUM - PATIENT PORTAL LINK FT
You can access the FollowMyHealth Patient Portal offered by [facility] by registering at the following website: [email or web address]. By joining [employer]’s FollowMyHealth portal, you will also be able to view your health information using other applications (apps) compatible with our system.

## 2023-11-09 NOTE — PROGRESS NOTE ADULT - SUBJECTIVE AND OBJECTIVE BOX
A 28year old  at 30w4d by ETD admitted for management of  contractions and dilation  HD#1    JOSE LUIS: 24  LMP: 23    Prenatal course complicated-   IVF pregnancy  maternal seizures (not any medication- seizure free since )   anxiety, depression - pt electing for primary  section with Dr Parsons, primary OB    Overnight Events:   -reeval for pain mgmt at approx 1900: managed with ofirmev  -NICU consult    S:    Patient was seen and examined at bedside.   Continues with abdominal cramping  denies N/V.   Denies vaginal bleeding, loss of fluid. +fetal movement, unchanged.   Denies fevers/chills/nausea/vomiting/lightheadedness/headache/chest pain/shortness of breath/changes in urination or bowel movements.     O:   T(C): 36.5 (23 @ 06:24), Max: 36.8 (23 @ 16:44)  HR: 73 (23 @ 06:24) (65 - 131)  BP: 99/60 (23 @ 06:24) (94/52 - 114/74)  RR: 18 (23 @ 04:30) (17 - 20)  SpO2: 92% (23 @ 00:43) (91% - 99%)    Pulm: no inc work of breathing  Abdomen: soft, nontender, + BS   Extrem: no calf tenderness or edema     23 @ 07:01  -  23 @ 06:32  --------------------------------------------------------  IN: 2000 mL / OUT: 0 mL / NET: 2000 mL    Overnight -:  Tracing: baseline 110, moderate variability, +accels, no decels  [place]: Ctx irregular  SVE : 0.5/20/-3 ( across two exams: 1400 > 1900)     Labs:            A 28year old  at 30w4d by ETD admitted for management of  contractions and dilation  HD#1    JOSE LUIS: 24  LMP: 23    Prenatal course complicated-   IVF pregnancy  maternal seizures (not any medication- seizure free since )   anxiety, depression - pt electing for primary  section with Dr Parsons, primary OB    Overnight Events:   -reeval for pain mgmt at approx 1900: managed with ofirmev  -NICU consult    S:    Patient was seen and examined at bedside.   Continues with abdominal cramping  denies N/V.   Denies vaginal bleeding, loss of fluid. +fetal movement, unchanged.   Denies fevers/chills/nausea/vomiting/lightheadedness/headache/chest pain/shortness of breath/changes in urination or bowel movements.     O:   T(C): 36.5 (23 @ 06:24), Max: 36.8 (23 @ 16:44)  HR: 73 (23 @ 06:24) (65 - 131)  BP: 99/60 (23 @ 06:24) (94/52 - 114/74)  RR: 18 (23 @ 04:30) (17 - 20)  SpO2: 92% (23 @ 00:43) (91% - 99%)    Pulm: no inc work of breathing  Abdomen: soft, nontender, + BS ; no guevara sign  : no CVA tenderness bilaterally  Extrem: no calf tenderness or edema     23 @ 07:01  -  23 @ 06:32  --------------------------------------------------------  IN: 2000 mL / OUT: 0 mL / NET: 2000 mL    Overnight -:  Tracing: baseline 110, moderate variability, +accels, no decels  [place]: Ctx irregular  SVE : 0.5/20/-3 ( across two exams: 1400 > 1900)     Labs:

## 2023-11-09 NOTE — DISCHARGE NOTE ANTEPARTUM - CARE PROVIDER_API CALL
no
Judith Parsons  Obstetrics and Gynecology  [address]  [place] [address]  Phone: [phone]  Fax: [phone]  Established Patient  Follow Up Time: 1 week

## 2023-11-09 NOTE — CHART NOTE - NSCHARTNOTEFT_GEN_A_CORE
patient IV infilitrated, and she does not want another IV placed  patient is drinking  will send a UA now to see if ketones clearing

## 2023-11-09 NOTE — DISCHARGE NOTE ANTEPARTUM - NS MD DC FALL RISK RISK
For information on Fall & Injury Prevention, visit: [email or web address] OR  [email or web address] OR  https://www.cdc.gov/steadi/patient.html

## 2023-11-10 VITALS
SYSTOLIC BLOOD PRESSURE: 104 MMHG | TEMPERATURE: 98 F | HEART RATE: 89 BPM | RESPIRATION RATE: 18 BRPM | OXYGEN SATURATION: 99 % | DIASTOLIC BLOOD PRESSURE: 63 MMHG

## 2023-11-10 LAB
CULTURE RESULTS: NO GROWTH — SIGNIFICANT CHANGE UP
CULTURE RESULTS: NO GROWTH — SIGNIFICANT CHANGE UP
GROUP B BETA STREP DNA (PCR): SIGNIFICANT CHANGE UP
GROUP B BETA STREP DNA (PCR): SIGNIFICANT CHANGE UP
SOURCE GROUP B STREP: SIGNIFICANT CHANGE UP
SOURCE GROUP B STREP: SIGNIFICANT CHANGE UP
SPECIMEN SOURCE: SIGNIFICANT CHANGE UP
SPECIMEN SOURCE: SIGNIFICANT CHANGE UP

## 2023-11-10 PROCEDURE — 87491 CHLMYD TRACH DNA AMP PROBE: CPT

## 2023-11-10 PROCEDURE — 86780 TREPONEMA PALLIDUM: CPT

## 2023-11-10 PROCEDURE — 87653 STREP B DNA AMP PROBE: CPT

## 2023-11-10 PROCEDURE — 99238 HOSP IP/OBS DSCHRG MGMT 30/<: CPT | Mod: GC

## 2023-11-10 PROCEDURE — 86901 BLOOD TYPING SEROLOGIC RH(D): CPT

## 2023-11-10 PROCEDURE — 82731 ASSAY OF FETAL FIBRONECTIN: CPT

## 2023-11-10 PROCEDURE — 86703 HIV-1/HIV-2 1 RESULT ANTBDY: CPT

## 2023-11-10 PROCEDURE — 36415 COLL VENOUS BLD VENIPUNCTURE: CPT

## 2023-11-10 PROCEDURE — 86900 BLOOD TYPING SEROLOGIC ABO: CPT

## 2023-11-10 PROCEDURE — 81003 URINALYSIS AUTO W/O SCOPE: CPT

## 2023-11-10 PROCEDURE — 85025 COMPLETE CBC W/AUTO DIFF WBC: CPT

## 2023-11-10 PROCEDURE — 87389 HIV-1 AG W/HIV-1&-2 AB AG IA: CPT

## 2023-11-10 PROCEDURE — 87591 N.GONORRHOEAE DNA AMP PROB: CPT

## 2023-11-10 PROCEDURE — 86850 RBC ANTIBODY SCREEN: CPT

## 2023-11-10 PROCEDURE — 80053 COMPREHEN METABOLIC PANEL: CPT

## 2023-11-10 PROCEDURE — 83690 ASSAY OF LIPASE: CPT

## 2023-11-10 PROCEDURE — 87086 URINE CULTURE/COLONY COUNT: CPT

## 2023-11-10 PROCEDURE — 81001 URINALYSIS AUTO W/SCOPE: CPT

## 2023-11-10 PROCEDURE — 82150 ASSAY OF AMYLASE: CPT

## 2023-11-10 RX ORDER — FOLIC ACID 0.8 MG
1 TABLET ORAL
Qty: 0 | Refills: 0 | DISCHARGE

## 2023-11-10 RX ADMIN — Medication 25 MILLIGRAM(S): at 11:50

## 2023-11-10 RX ADMIN — Medication 1 TABLET(S): at 11:49

## 2023-11-10 RX ADMIN — Medication 1 MILLIGRAM(S): at 11:49

## 2023-11-10 RX ADMIN — DOXYLAMINE SUCCINATE AND PYRIDOXINE HYDROCHLORIDE, DELAYED RELEASE TABLETS 10 MG/10 MG 1 TABLET(S): 10; 10 TABLET, DELAYED RELEASE ORAL at 11:49

## 2023-11-10 RX ADMIN — Medication 25 MILLIGRAM(S): at 05:30

## 2023-11-10 NOTE — PROGRESS NOTE ADULT - SUBJECTIVE AND OBJECTIVE BOX
A 28year old  at 30w5d by ETD admitted for management of  contractions and dilation  HD#3    JOSE LUIS: 24  LMP: 23    Prenatal course complicated-   IVF pregnancy  maternal seizures (not any medication- seizure free since )   anxiety, depression - pt electing for primary  section with Dr Parsons, primary OB      S:    Patient was seen and examined at bedside.   Continues with mild abdominal cramping, improving; right sided abdominal soreness, at times radiating to the back  denies any recent vomiting; has had vomiting throughout pregnancy   Denies vaginal bleeding, loss of fluid. +fetal movement, unchanged.   Denies fevers/chills/nausea/vomiting/lightheadedness/headache/chest pain/shortness of breath/changes in urination or bowel movements.     O:   Vital Signs Last 24 Hrs  T(C): 36.7 (10 Nov 2023 03:40), Max: 36.9 (2023 19:42)  T(F): 98.1 (10 Nov 2023 03:40), Max: 98.4 (2023 19:42)  HR: 72 (10 Nov 2023 03:40) (68 - 86)  BP: 98/52 (10 Nov 2023 03:40) (92/52 - 106/60)  RR: 18 (10 Nov 2023 03:40) (16 - 18)  SpO2: 96% (10 Nov 2023 03:40) (96% - 99%)    Parameters below as of 10 Nov 2023 03:40  Patient On (Oxygen Delivery Method): room air        Pulm: no inc work of breathing  Abdomen: soft, nontender, + BS ; no guevara sign  : no CVA tenderness bilaterally  Extrem: no calf tenderness or edema     23 @ 07:01  -  23 @ 06:32  --------------------------------------------------------  IN: 2000 mL / OUT: 0 mL / NET: 2000 mL    Overnight -:  Tracing: baseline 110, moderate variability, +accels, no decels  [place]: Ctx irregular  SVE : 0.5/20/-3 ( across two exams: 1400 > 1900)     Labs:             135  |  102  |  5.0<L>  ----------------------------<  119<H>  4.0   |  21.0<L>  |  0.39<L>    Ca    9.0      2023 11:41    TPro  6.4<L>  /  Alb  3.4  /  TBili  0.6  /  DBili  x   /  AST  18  /  ALT  15  /  AlkPhos  98

## 2023-11-10 NOTE — PROGRESS NOTE ADULT - PROBLEM SELECTOR PROBLEM 2
uterine contractions, antepartum, third trimester
 uterine contractions, antepartum, third trimester

## 2023-11-10 NOTE — PROGRESS NOTE ADULT - PROBLEM SELECTOR PLAN 4
-: CORDELL 26; will repeat   -uterine distention may contribute to  prelabor contractions.
-: CORDELL 26; will repeat today  -uterine distention may contribute to  prelabor contractions.

## 2023-11-10 NOTE — PROGRESS NOTE ADULT - PROBLEM SELECTOR PLAN 7
-encourage hydration  -low suspicion of nephrolithiasis contributing to contractions and abdominal pain, given that her UA negative for blood, casts, sudden onset of clinical picture, and physical exam more indicative of musculoskeletal origin
-encourage hydration  -low suspicion of nephrolithiasis contributing to contractions and abdominal pain, given that her UA negative for blood, casts, sudden onset of clinical picture, and physical exam more indicative of musculoskeletal origin

## 2023-11-10 NOTE — PROGRESS NOTE ADULT - ATTENDING COMMENTS
MFM Attending    Admitted with  contractions. No cervical change. Intact membranes. Reactive NST. Stable for d/c home.    ANTON Stapleton
MFM - IUP at 30w4d admitted with  contractions, arrested PTL, mild polyhydramnios noted on ultrasound.  Patient with increased pain overnight and was reexamined without further cervical change. Pain improved with Tylenol. Had episode of vomiting when pain was severe, but also reports vomiting throughout pregnancy and would like to restart Diclegis. She is hungry and has tolerated multiple meals since admission without difficulty. Patient states dull pain continues on right side of abdomen.  She has had ana GI complaint as well including watery stool and heartburn. No VB or discharge. +FM.  On exam, mild paraspinal tenderness L/S spine.  Patient states she has a 'cyst' there since childhood.  Abdomen soft and nontender. No rebound or guarding.  FHRT reactive with ctx q 5-8 minutes.  Patient and  advised of the results of the evaluation to date.  Low concern of infectious process.  Outpatient labs reviewed and . Trend WBC not valid as elevation may reflect recent betamethasone administration.  Will obtain CMP/amylase/lipase.  Low suspicion for  process.  Continue IVF hydration to clear ketones on UA. Betamethasone#2 this afternoon.  Continue indocin for 48 hours of therapy. Transition to intermittent monitoring.  Evaluate after betamethasone compete for disposition.   Jessica Becerra MD  Maternal Fetal Medicine

## 2023-11-10 NOTE — PROGRESS NOTE ADULT - PROBLEM SELECTOR PLAN 1
-betamethasone for fetal lung maturity x2 doses, to complete today  -Sono : 1950g, vtx, posterior placenta  -NST reactive, continue in setting of painful ctx and concern for  labor  -GBS unknown, results pending  -outside prenatal records obtained from Dr Parsons, primary OB.
-betamethasone for fetal lung maturity x2 doses, to complete today  -Sono 11/8: 1950g, vtx, posterior placenta  -NST reactive, now BID   -GBS unknown, results pending  -outside prenatal records obtained from Dr Parsons, primary OB.

## 2023-11-10 NOTE — PROGRESS NOTE ADULT - ASSESSMENT
A 28year old  at 30w5d by ETD admitted for management of  contractions and dilation  HD#3    
A 28year old  at 30w4d by ETD admitted for management of  contractions and dilation  HD#1

## 2023-11-10 NOTE — PROGRESS NOTE ADULT - PROBLEM SELECTOR PLAN 6
previously on keppra  no acute concerns  last 2021.
previously on keppra  no acute concerns  last 2021.

## 2023-11-10 NOTE — PROGRESS NOTE ADULT - PROBLEM SELECTOR PLAN 5
VTE chemoppx HD#3 if she will continue to be inpatient  -SCDs while in bed.
VTE chemoppx HD#3  -SCDs while in bed.

## 2023-11-10 NOTE — PROGRESS NOTE ADULT - PROBLEM SELECTOR PLAN 2
-indomethacin 50 loading, followed by 25; to discontinue after 48hrs   -s/p betamethasone x2  -concern for  labor; 0.5cm/20/-2 station on  across two exams, will reevaluate prior to d/c  -GBS, BV, GCCT pending  -UA unremarkable except for ketonuria (improved after D5LR); UCx pending  -Sono : vtx, posterior placenta, 2.9 cervical length, 1950g, polyhydramnios, BPP   -FFN negative  - plan for elective primary  section if requiring delivery
-indomethacin 50 loading, followed by 25; to discontinue after betamethasone second dose  -betamethasone x2  -concern for  labor; 0.5cm/20/-2 station on   -GBS, BV, GCCT pending  -UA unremarkable, UCx pending  -Sono : vtx, posterior placenta, 2.9 cervical length, 1950g, polyhydramnios, BPP   -FFN negative  -pain management options discussed; required IV ofirmev overnight  - IV fluid bolus, improving in discomfort.  - plan for elective primary  section if requiring delivery

## 2023-11-21 ENCOUNTER — OUTPATIENT (OUTPATIENT)
Dept: INPATIENT UNIT | Facility: HOSPITAL | Age: 28
LOS: 1 days | Discharge: HOME | End: 2023-11-21
Payer: COMMERCIAL

## 2023-11-21 DIAGNOSIS — Z90.49 ACQUIRED ABSENCE OF OTHER SPECIFIED PARTS OF DIGESTIVE TRACT: Chronic | ICD-10-CM

## 2023-11-21 DIAGNOSIS — O26.899 OTHER SPECIFIED PREGNANCY RELATED CONDITIONS, UNSPECIFIED TRIMESTER: ICD-10-CM

## 2023-11-21 DIAGNOSIS — Z98.890 OTHER SPECIFIED POSTPROCEDURAL STATES: Chronic | ICD-10-CM

## 2023-11-21 PROCEDURE — 59025 FETAL NON-STRESS TEST: CPT

## 2023-11-21 PROCEDURE — 99214 OFFICE O/P EST MOD 30 MIN: CPT

## 2023-11-21 PROCEDURE — 76817 TRANSVAGINAL US OBSTETRIC: CPT

## 2023-11-21 PROCEDURE — 59025 FETAL NON-STRESS TEST: CPT | Mod: 26

## 2023-11-21 PROCEDURE — 99221 1ST HOSP IP/OBS SF/LOW 40: CPT | Mod: 25,GC

## 2023-11-21 PROCEDURE — 76815 OB US LIMITED FETUS(S): CPT

## 2023-11-21 PROCEDURE — 76819 FETAL BIOPHYS PROFIL W/O NST: CPT

## 2023-11-22 PROBLEM — Z87.898 PERSONAL HISTORY OF OTHER SPECIFIED CONDITIONS: Chronic | Status: ACTIVE | Noted: 2023-11-08

## 2023-11-22 PROCEDURE — 76817 TRANSVAGINAL US OBSTETRIC: CPT | Mod: 26

## 2023-11-22 PROCEDURE — 76815 OB US LIMITED FETUS(S): CPT | Mod: 26

## 2023-11-22 PROCEDURE — 76819 FETAL BIOPHYS PROFIL W/O NST: CPT | Mod: 26

## 2023-11-23 DIAGNOSIS — F41.8 OTHER SPECIFIED ANXIETY DISORDERS: ICD-10-CM

## 2023-11-23 DIAGNOSIS — O99.353 DISEASES OF THE NERVOUS SYSTEM COMPLICATING PREGNANCY, THIRD TRIMESTER: ICD-10-CM

## 2023-11-23 DIAGNOSIS — G40.109 LOCALIZATION-RELATED (FOCAL) (PARTIAL) SYMPTOMATIC EPILEPSY AND EPILEPTIC SYNDROMES WITH SIMPLE PARTIAL SEIZURES, NOT INTRACTABLE, WITHOUT STATUS EPILEPTICUS: ICD-10-CM

## 2023-11-23 DIAGNOSIS — Z3A.32 32 WEEKS GESTATION OF PREGNANCY: ICD-10-CM

## 2023-11-23 DIAGNOSIS — O09.813 SUPERVISION OF PREGNANCY RESULTING FROM ASSISTED REPRODUCTIVE TECHNOLOGY, THIRD TRIMESTER: ICD-10-CM

## 2023-11-23 DIAGNOSIS — O99.343 OTHER MENTAL DISORDERS COMPLICATING PREGNANCY, THIRD TRIMESTER: ICD-10-CM

## 2023-11-23 DIAGNOSIS — O47.03 FALSE LABOR BEFORE 37 COMPLETED WEEKS OF GESTATION, THIRD TRIMESTER: ICD-10-CM

## 2024-01-04 ENCOUNTER — OUTPATIENT (OUTPATIENT)
Dept: INPATIENT UNIT | Facility: HOSPITAL | Age: 29
LOS: 1 days | Discharge: HOME | End: 2024-01-04
Payer: COMMERCIAL

## 2024-01-04 VITALS
RESPIRATION RATE: 16 BRPM | DIASTOLIC BLOOD PRESSURE: 89 MMHG | SYSTOLIC BLOOD PRESSURE: 124 MMHG | OXYGEN SATURATION: 99 % | TEMPERATURE: 98 F | HEART RATE: 103 BPM

## 2024-01-04 DIAGNOSIS — O99.343 OTHER MENTAL DISORDERS COMPLICATING PREGNANCY, THIRD TRIMESTER: ICD-10-CM

## 2024-01-04 DIAGNOSIS — Z01.818 ENCOUNTER FOR OTHER PREPROCEDURAL EXAMINATION: ICD-10-CM

## 2024-01-04 DIAGNOSIS — Z90.49 ACQUIRED ABSENCE OF OTHER SPECIFIED PARTS OF DIGESTIVE TRACT: Chronic | ICD-10-CM

## 2024-01-04 DIAGNOSIS — Z98.890 OTHER SPECIFIED POSTPROCEDURAL STATES: Chronic | ICD-10-CM

## 2024-01-04 DIAGNOSIS — Z87.442 PERSONAL HISTORY OF URINARY CALCULI: Chronic | ICD-10-CM

## 2024-01-04 LAB
ALBUMIN SERPL ELPH-MCNC: 2.8 G/DL — LOW (ref 3.3–5)
ALBUMIN SERPL ELPH-MCNC: 2.8 G/DL — LOW (ref 3.3–5)
ALP SERPL-CCNC: 167 U/L — HIGH (ref 40–120)
ALP SERPL-CCNC: 167 U/L — HIGH (ref 40–120)
ALT FLD-CCNC: 20 U/L — SIGNIFICANT CHANGE UP (ref 12–78)
ALT FLD-CCNC: 20 U/L — SIGNIFICANT CHANGE UP (ref 12–78)
ANION GAP SERPL CALC-SCNC: 8 MMOL/L — SIGNIFICANT CHANGE UP (ref 5–17)
ANION GAP SERPL CALC-SCNC: 8 MMOL/L — SIGNIFICANT CHANGE UP (ref 5–17)
AST SERPL-CCNC: 22 U/L — SIGNIFICANT CHANGE UP (ref 15–37)
AST SERPL-CCNC: 22 U/L — SIGNIFICANT CHANGE UP (ref 15–37)
BILIRUB SERPL-MCNC: 0.9 MG/DL — SIGNIFICANT CHANGE UP (ref 0.2–1.2)
BILIRUB SERPL-MCNC: 0.9 MG/DL — SIGNIFICANT CHANGE UP (ref 0.2–1.2)
BUN SERPL-MCNC: 10 MG/DL — SIGNIFICANT CHANGE UP (ref 7–23)
BUN SERPL-MCNC: 10 MG/DL — SIGNIFICANT CHANGE UP (ref 7–23)
CALCIUM SERPL-MCNC: 10 MG/DL — SIGNIFICANT CHANGE UP (ref 8.5–10.1)
CALCIUM SERPL-MCNC: 10 MG/DL — SIGNIFICANT CHANGE UP (ref 8.5–10.1)
CHLORIDE SERPL-SCNC: 104 MMOL/L — SIGNIFICANT CHANGE UP (ref 96–108)
CHLORIDE SERPL-SCNC: 104 MMOL/L — SIGNIFICANT CHANGE UP (ref 96–108)
CO2 SERPL-SCNC: 25 MMOL/L — SIGNIFICANT CHANGE UP (ref 22–31)
CO2 SERPL-SCNC: 25 MMOL/L — SIGNIFICANT CHANGE UP (ref 22–31)
CREAT SERPL-MCNC: 0.64 MG/DL — SIGNIFICANT CHANGE UP (ref 0.5–1.3)
CREAT SERPL-MCNC: 0.64 MG/DL — SIGNIFICANT CHANGE UP (ref 0.5–1.3)
EGFR: 122 ML/MIN/1.73M2 — SIGNIFICANT CHANGE UP
EGFR: 122 ML/MIN/1.73M2 — SIGNIFICANT CHANGE UP
GLUCOSE SERPL-MCNC: 78 MG/DL — SIGNIFICANT CHANGE UP (ref 70–99)
GLUCOSE SERPL-MCNC: 78 MG/DL — SIGNIFICANT CHANGE UP (ref 70–99)
HCT VFR BLD CALC: 34.6 % — SIGNIFICANT CHANGE UP (ref 34.5–45)
HCT VFR BLD CALC: 34.6 % — SIGNIFICANT CHANGE UP (ref 34.5–45)
HGB BLD-MCNC: 12 G/DL — SIGNIFICANT CHANGE UP (ref 11.5–15.5)
HGB BLD-MCNC: 12 G/DL — SIGNIFICANT CHANGE UP (ref 11.5–15.5)
MCHC RBC-ENTMCNC: 31.2 PG — SIGNIFICANT CHANGE UP (ref 27–34)
MCHC RBC-ENTMCNC: 31.2 PG — SIGNIFICANT CHANGE UP (ref 27–34)
MCHC RBC-ENTMCNC: 34.7 GM/DL — SIGNIFICANT CHANGE UP (ref 32–36)
MCHC RBC-ENTMCNC: 34.7 GM/DL — SIGNIFICANT CHANGE UP (ref 32–36)
MCV RBC AUTO: 89.9 FL — SIGNIFICANT CHANGE UP (ref 80–100)
MCV RBC AUTO: 89.9 FL — SIGNIFICANT CHANGE UP (ref 80–100)
PLATELET # BLD AUTO: 296 K/UL — SIGNIFICANT CHANGE UP (ref 150–400)
PLATELET # BLD AUTO: 296 K/UL — SIGNIFICANT CHANGE UP (ref 150–400)
POTASSIUM SERPL-MCNC: 3.9 MMOL/L — SIGNIFICANT CHANGE UP (ref 3.5–5.3)
POTASSIUM SERPL-MCNC: 3.9 MMOL/L — SIGNIFICANT CHANGE UP (ref 3.5–5.3)
POTASSIUM SERPL-SCNC: 3.9 MMOL/L — SIGNIFICANT CHANGE UP (ref 3.5–5.3)
POTASSIUM SERPL-SCNC: 3.9 MMOL/L — SIGNIFICANT CHANGE UP (ref 3.5–5.3)
PROT SERPL-MCNC: 7.7 GM/DL — SIGNIFICANT CHANGE UP (ref 6–8.3)
PROT SERPL-MCNC: 7.7 GM/DL — SIGNIFICANT CHANGE UP (ref 6–8.3)
RAPID RVP RESULT: SIGNIFICANT CHANGE UP
RAPID RVP RESULT: SIGNIFICANT CHANGE UP
RBC # BLD: 3.85 M/UL — SIGNIFICANT CHANGE UP (ref 3.8–5.2)
RBC # BLD: 3.85 M/UL — SIGNIFICANT CHANGE UP (ref 3.8–5.2)
RBC # FLD: 13.4 % — SIGNIFICANT CHANGE UP (ref 10.3–14.5)
RBC # FLD: 13.4 % — SIGNIFICANT CHANGE UP (ref 10.3–14.5)
SARS-COV-2 RNA SPEC QL NAA+PROBE: SIGNIFICANT CHANGE UP
SARS-COV-2 RNA SPEC QL NAA+PROBE: SIGNIFICANT CHANGE UP
SODIUM SERPL-SCNC: 137 MMOL/L — SIGNIFICANT CHANGE UP (ref 135–145)
SODIUM SERPL-SCNC: 137 MMOL/L — SIGNIFICANT CHANGE UP (ref 135–145)
WBC # BLD: 10.89 K/UL — HIGH (ref 3.8–10.5)
WBC # BLD: 10.89 K/UL — HIGH (ref 3.8–10.5)
WBC # FLD AUTO: 10.89 K/UL — HIGH (ref 3.8–10.5)
WBC # FLD AUTO: 10.89 K/UL — HIGH (ref 3.8–10.5)

## 2024-01-04 PROCEDURE — 99221 1ST HOSP IP/OBS SF/LOW 40: CPT | Mod: 25,GC

## 2024-01-04 PROCEDURE — 80053 COMPREHEN METABOLIC PANEL: CPT

## 2024-01-04 PROCEDURE — 96375 TX/PRO/DX INJ NEW DRUG ADDON: CPT

## 2024-01-04 PROCEDURE — 99214 OFFICE O/P EST MOD 30 MIN: CPT

## 2024-01-04 PROCEDURE — 0225U NFCT DS DNA&RNA 21 SARSCOV2: CPT

## 2024-01-04 PROCEDURE — 59025 FETAL NON-STRESS TEST: CPT

## 2024-01-04 PROCEDURE — 86900 BLOOD TYPING SEROLOGIC ABO: CPT

## 2024-01-04 PROCEDURE — 86850 RBC ANTIBODY SCREEN: CPT

## 2024-01-04 PROCEDURE — 96361 HYDRATE IV INFUSION ADD-ON: CPT

## 2024-01-04 PROCEDURE — 85027 COMPLETE CBC AUTOMATED: CPT

## 2024-01-04 PROCEDURE — 96376 TX/PRO/DX INJ SAME DRUG ADON: CPT

## 2024-01-04 PROCEDURE — 36415 COLL VENOUS BLD VENIPUNCTURE: CPT

## 2024-01-04 PROCEDURE — 59025 FETAL NON-STRESS TEST: CPT | Mod: 26

## 2024-01-04 PROCEDURE — 96374 THER/PROPH/DIAG INJ IV PUSH: CPT

## 2024-01-04 PROCEDURE — 86901 BLOOD TYPING SEROLOGIC RH(D): CPT

## 2024-01-04 RX ORDER — SODIUM CHLORIDE 9 MG/ML
1000 INJECTION, SOLUTION INTRAVENOUS ONCE
Refills: 0 | Status: COMPLETED | OUTPATIENT
Start: 2024-01-04 | End: 2024-01-04

## 2024-01-04 RX ORDER — FAMOTIDINE 10 MG/ML
20 INJECTION INTRAVENOUS ONCE
Refills: 0 | Status: COMPLETED | OUTPATIENT
Start: 2024-01-04 | End: 2024-01-04

## 2024-01-04 RX ORDER — ONDANSETRON 8 MG/1
4 TABLET, FILM COATED ORAL ONCE
Refills: 0 | Status: COMPLETED | OUTPATIENT
Start: 2024-01-04 | End: 2024-01-04

## 2024-01-04 RX ORDER — ACETAMINOPHEN 500 MG
1000 TABLET ORAL ONCE
Refills: 0 | Status: COMPLETED | OUTPATIENT
Start: 2024-01-04 | End: 2024-01-04

## 2024-01-04 RX ORDER — ONDANSETRON 8 MG/1
1 TABLET, FILM COATED ORAL
Qty: 12 | Refills: 0
Start: 2024-01-04 | End: 2024-01-06

## 2024-01-04 RX ORDER — FAMOTIDINE 10 MG/ML
1 INJECTION INTRAVENOUS
Qty: 3 | Refills: 0
Start: 2024-01-04 | End: 2024-01-06

## 2024-01-04 RX ADMIN — Medication 1000 MILLIGRAM(S): at 22:23

## 2024-01-04 RX ADMIN — ONDANSETRON 4 MILLIGRAM(S): 8 TABLET, FILM COATED ORAL at 19:13

## 2024-01-04 RX ADMIN — ONDANSETRON 4 MILLIGRAM(S): 8 TABLET, FILM COATED ORAL at 19:59

## 2024-01-04 RX ADMIN — SODIUM CHLORIDE 1000 MILLILITER(S): 9 INJECTION, SOLUTION INTRAVENOUS at 20:00

## 2024-01-04 RX ADMIN — SODIUM CHLORIDE 1000 MILLILITER(S): 9 INJECTION, SOLUTION INTRAVENOUS at 18:45

## 2024-01-04 RX ADMIN — FAMOTIDINE 20 MILLIGRAM(S): 10 INJECTION INTRAVENOUS at 21:03

## 2024-01-04 RX ADMIN — Medication 400 MILLIGRAM(S): at 21:03

## 2024-01-04 NOTE — OB PROVIDER TRIAGE NOTE - ATTENDING COMMENTS
This case was fully discussed wit Dr. Head. The pt desires elective primary C/Section which is scheduled. Serial cervical exams were unchanged. NST reactive without decelerations x several hours. Plan for D/C to home for further follow up is agreed to by pt.

## 2024-01-04 NOTE — OB RN TRIAGE NOTE - NS_OBGYNHISTORY_OBGYN_ALL_OB_FT
IVF pregnancy. Pt states she did not get second GBS swab due to first GBS swab being negative. Fetal echo originally noted for a ventricular septal defect. As per patient further testing r/o any concerns, no follow up with [facility] cardiologist. Pt had PTL @ 30 weeks and received betamethasone. IVF pregnancy. Pt states she did not get second GBS swab due to first GBS swab being negative. Fetal echo originally noted for a ventricular septal defect. As per patient further testing r/o any concerns, no follow up with [facility] cardiologist. Pt had PTL @ 30 weeks and received betamethasone.

## 2024-01-04 NOTE — OB PROVIDER TRIAGE NOTE - NS_OBGYNHISTORY_OBGYN_ALL_OB_FT
IVF pregnancy. Pt states she did not get second GBS swab due to first GBS swab being negative. Fetal echo originally noted for a ventricular septal defect. As per patient further testing r/o any concerns, no follow up with [facility] cardiologist. Pt had PTL @ 30 weeks and received betamethasone. IVF pregnancy. Pt states she did not get second GBS swab due to first GBS swab being negative. Fetal echo originally noted for a ventricular septal defect. As per patient further testing r/o any concerns, no follow up with [facility] cardiologist. Pt had PTL @ 30 weeks and received betamethasone.

## 2024-01-04 NOTE — OB PROVIDER TRIAGE NOTE - NSHPPHYSICALEXAM_GEN_ALL_CORE
Vitals:   T(C): 36.6 (01-04-24 @ 18:57), Max: 36.6 (01-04-24 @ 18:57)  T(F): 97.9 (01-04-24 @ 18:57), Max: 97.9 (01-04-24 @ 18:57)  HR: 103 (01-04-24 @ 18:57) (103 - 103)  BP: 124/89 (01-04-24 @ 18:57) (124/89 - 124/89)  RR: 16 (01-04-24 @ 18:57) (16 - 16)  SpO2: 99% (01-04-24 @ 18:57) (99% - 99%)    General: AAOx3, NAD  Abd: Soft, nontender, gravid  SVE: 0/0/-3 @ 2100 > 0/0/-3 @ 2300    BMI (kg/m2): 29.8 (01-04-24), 29.8 (01-04-24), 27.5 (11-08-23)    FHT: 120bpm mod variability +accels -decels  [place]: q5min Vitals:   T(C): 36.6 (01-04-24 @ 18:57), Max: 36.6 (01-04-24 @ 18:57)  T(F): 97.9 (01-04-24 @ 18:57), Max: 97.9 (01-04-24 @ 18:57)  HR: 103 (01-04-24 @ 18:57) (103 - 103)  BP: 124/89 (01-04-24 @ 18:57) (124/89 - 124/89)  RR: 16 (01-04-24 @ 18:57) (16 - 16)  SpO2: 99% (01-04-24 @ 18:57) (99% - 99%)    General: AAOx3, NAD  Abd: Soft, nontender, gravid  SVE: 0/0/-3 @ 2100 > 0/0/-3 @ 2300    BMI (kg/m2): 29.8 (01-04-24), 29.8 (01-04-24), 27.5 (11-08-23)    FHT: 120bpm mod variability +accels -decels  [place]: q5min

## 2024-01-04 NOTE — OB PROVIDER TRIAGE NOTE - HISTORY OF PRESENT ILLNESS
SANDRO MIKE is a 27yo  at 38w4d by IVF transfer date JOSE LUIS 24 presenting for nausea, vomiting, and abdominal pain for the past three days. Notes cramping abdominal pain that has worsened over the course of the day and occurs every 10 minutes. Denies leakage of fluids, vaginal bleeding, fevers, chills, sick contacts, diarrhea, constipation, abnormal vaginal discharge, dysuria. Reports active fetal movement.     PNC w Dr. Parsons:  1. BMZ complete @ 30wga 2/2  contractions  2. IVF pregnancy    OBHx: Non significant  GYNHx:  -fibroids, -ovarian cysts, denies STD hx   PMH: Anxiety, Depression, focal seizure disorder not on any medication ( seizure free since )   PSH: Umbilical hernia repair, cholecystectomy  SH: Denies EtOH, tobacco and illicit drug use during this pregnancy, Feels safe at home   Allergies: NKDA  Meds: PNV, dicleygis

## 2024-01-04 NOTE — OB RN TRIAGE NOTE - NSICDXPASTSURGICALHX_GEN_ALL_CORE_FT
PAST SURGICAL HISTORY:  H/O umbilical hernia repair     History of cholecystectomy     History of kidney stones

## 2024-01-04 NOTE — OB RN TRIAGE NOTE - NSICDXPASTMEDICALHX_GEN_ALL_CORE_FT
PAST MEDICAL HISTORY:  Anxiety     Depression     History of psychogenic nonepileptic seizure     IBS (irritable bowel syndrome)     Post traumatic stress disorder (PTSD)

## 2024-01-04 NOTE — OB PROVIDER TRIAGE NOTE - NSOBPROVIDERNOTE_OBGYN_ALL_OB_FT
A/P: SANDRO MIKE is a 29yo  at 38w4d by IVF transfer date JOSE LUIS 24 presenting for nausea, vomiting, and abdominal pain for the past three days.    D/w Dr. Fitzpatrick A/P: SANDRO MIKE is a 27yo  at 38w4d by IVF transfer date JOSE LUIS 24 presenting for nausea, vomiting, and abdominal pain for the past three days.    - Afebrile, no leukocytosis, RVP negative.  - N/V resolved post IVF, zofran, and pepcid.   - Initially with moderate contraction pain on presentation, since improved. Contractions on toco persist q5min.  - Cervical exam 0/0/-3 and unchanged after 2 hours. Not currently in labor.  - Patient to be discharged home with zofran and pepcid. To return Monday morning for her scheduled pCS. To return for evaluation sooner if contraction painfully q2-3min, if vaginal bleeding, if leakage of fluids, or if decreased fetal movement.     D/w Dr. Fitzpatrick A/P: SANDRO MIKE is a 29yo  at 38w4d by IVF transfer date JOSE LUIS 24 presenting for nausea, vomiting, and abdominal pain for the past three days.    - Afebrile, no leukocytosis, RVP negative.  - N/V resolved post IVF, zofran, and pepcid.   - Initially with moderate contraction pain on presentation, since improved. Contractions on toco persist q5min.  - Cervical exam 0/0/-3 and unchanged after 2 hours. Not currently in labor.  - Patient to be discharged home with zofran and pepcid. To return Monday morning for her scheduled pCS. To return for evaluation sooner if contraction painfully q2-3min, if vaginal bleeding, if leakage of fluids, or if decreased fetal movement.     D/w Dr. Fitzpatrick

## 2024-01-04 NOTE — OB RN TRIAGE NOTE - FALL HARM RISK - UNIVERSAL INTERVENTIONS
Bed in lowest position, wheels locked, appropriate side rails in place/Call bell, personal items and telephone in reach/Instruct patient to call for assistance before getting out of bed or chair/Non-slip footwear when patient is out of bed/[place] to call system/Physically safe environment - no spills, clutter or unnecessary equipment/Purposeful Proactive Rounding/Room/bathroom lighting operational, light cord in reach Bed in lowest position, wheels locked, appropriate side rails in place/Call bell, personal items and telephone in reach/Instruct patient to call for assistance before getting out of bed or chair/Non-slip footwear when patient is out of bed/[place] to call system/Physically safe environment - no spills, clutter or unnecessary equipment/Purposeful Proactive Rounding/Room/bathroom lighting operational, light cord in reach

## 2024-01-04 NOTE — OB PROVIDER TRIAGE NOTE - NSRISKFACTORS_OBGYN_ALL_OB
General Adult


EDM:


Chief Complaint:  HEAT EXPOSURE





HPI:


HPI:





Patient is a 45  year old male who presents with complaint of severe muscle 

cramps all over after working out in the heat with asphalt for the last 2 days. 

Patient states that he has not urinated at all today.  He states that yesterday 

his urine was orange.  He states that he has been drinking a lot of water, 

stating that he drank approximately 3 gallons today.  He states that he still 

feeling very thirsty.  He denies any chest pain or shortness of breath.  He 

rates the pain at an 8 out of 10 and states that it is all over his body, worse 

in his abdominal wall and in his legs.  He states that it feels like he has 

charley horses all over.  []





Review of Systems:


Review of Systems:


Constitutional:  Denies fever or chills. []


Respiratory:  Denies cough or shortness of breath. [] 


Cardiovascular:  Denies chest pain or edema. [] 


GI: Complains of abdominal wall cramps without vomiting or diarrhea. [] 


Musculoskeletal: Complains of diffuse body pain. [] 


Integument:  Denies rash. [] 


Neurologic:  Denies headache, focal weakness or sensory changes. [] 





A full 10 point review of systems has been reviewed and is otherwise negative.





Heart Score:


Risk Factors:


Risk Factors:  DM, Current or recent (<one month) smoker, HTN, HLP, family 

history of CAD, obesity.


Risk Scores:


Score 0 - 3:  2.5% MACE over next 6 weeks - Discharge Home


Score 4 - 6:  20.3% MACE over next 6 weeks - Admit for Clinical Observation


Score 7 - 10:  72.7% MACE over next 6 weeks - Early Invasive Strategies





Current Medications:





Current Medications








 Medications


  (Trade)  Dose


 Ordered  Sig/Fang  Start Time


 Stop Time Status Last Admin


Dose Admin


 


 Fentanyl Citrate


  (Fentanyl 2ml


 Vial)  100 mcg  STK-MED ONCE  6/3/20 18:22


 6/3/20 18:22 DC  





 


 Sodium Chloride  1,000 ml @ 


 1,000 mls/hr  Q1H  6/3/20 18:16


 6/3/20 19:15  6/3/20 18:24


1,000 MLS/HR











Allergies:


Allergies:





Allergies








Coded Allergies Type Severity Reaction Last Updated Verified


 


  No Known Drug Allergies    6/3/20 No











Physical Exam:


PE:





Constitutional: Well developed, well nourished, in mild distress. []


HENT: Normocephalic, atraumatic, bilateral external ears normal, oropharynx 

moist, no oral exudates, nose normal. []


Eyes: PERRLA, EOMI, conjunctiva normal, no discharge. [] 


Neck: Normal range of motion, no tenderness, supple, no stridor. [] 


Cardiovascular: Regular rate and rhythm []


Lungs & Thorax:  Bilateral breath sounds clear to auscultation []


Abdomen: Bowel sounds normal, soft, with diffuse abdominal wall tenderness. [] 


Skin: Warm, dry, no erythema, no rash. [] 


Extremities: Diffusely tender, no cyanosis, no clubbing, ROM intact, no edema. 

[] 


Neurologic: Alert and oriented X 3, no focal deficits noted. []





EKG:


EKG:


EKG demonstrates normal sinus rhythm with rate of 86.  []





Radiology/Procedures:


Radiology/Procedures:


[]





Course & Med Decision Making:


Course & Med Decision Making


Pertinent Labs and Imaging studies reviewed. (See chart for details)





[]





Dragon Disclaimer:


Dragon Disclaimer:


This electronic medical record was generated, in whole or in part, using a voice

 recognition dictation system.





Departure


Departure


Impression:  


   Primary Impression:  


   Heat cramps


   Qualified Codes:  T67.2XXA - Heat cramp, initial encounter


   Additional Impression:  


   Dehydration


Disposition:  01 HOME, SELF-CARE


Condition:  STABLE


Patient Instructions:  Dehydration, Adult, Heat Disorders


Scripts


Tramadol Hcl (TRAMADOL HCL) 50 Mg Tablet


50 MG PO Q6HRS PRN for PAIN, #12 TAB


   Prov: JEFFERY BALLESTEROS Jr. DO         6/3/20 


Orphenadrine Citrate (ORPHENADRINE CITRATE) 100 Mg Tablet.er


1 TAB PO BID PRN for MUSCLE PAIN, #14 TAB


   Prov: JEFFERY BALLESTEROS Jr. DO         6/3/20











JEFFERY BALLESTEROS Jr. DO           Gm 3, 2020 18:34
No

## 2024-01-05 LAB
HCT VFR BLD CALC: 29.7 % — LOW (ref 34.5–45)
HCT VFR BLD CALC: 29.7 % — LOW (ref 34.5–45)
HGB BLD-MCNC: 10.1 G/DL — LOW (ref 11.5–15.5)
HGB BLD-MCNC: 10.1 G/DL — LOW (ref 11.5–15.5)
MCHC RBC-ENTMCNC: 30.9 PG — SIGNIFICANT CHANGE UP (ref 27–34)
MCHC RBC-ENTMCNC: 30.9 PG — SIGNIFICANT CHANGE UP (ref 27–34)
MCHC RBC-ENTMCNC: 34 GM/DL — SIGNIFICANT CHANGE UP (ref 32–36)
MCHC RBC-ENTMCNC: 34 GM/DL — SIGNIFICANT CHANGE UP (ref 32–36)
MCV RBC AUTO: 90.8 FL — SIGNIFICANT CHANGE UP (ref 80–100)
MCV RBC AUTO: 90.8 FL — SIGNIFICANT CHANGE UP (ref 80–100)
PLATELET # BLD AUTO: 265 K/UL — SIGNIFICANT CHANGE UP (ref 150–400)
PLATELET # BLD AUTO: 265 K/UL — SIGNIFICANT CHANGE UP (ref 150–400)
RBC # BLD: 3.27 M/UL — LOW (ref 3.8–5.2)
RBC # BLD: 3.27 M/UL — LOW (ref 3.8–5.2)
RBC # FLD: 13.3 % — SIGNIFICANT CHANGE UP (ref 10.3–14.5)
RBC # FLD: 13.3 % — SIGNIFICANT CHANGE UP (ref 10.3–14.5)
WBC # BLD: 11.84 K/UL — HIGH (ref 3.8–10.5)
WBC # BLD: 11.84 K/UL — HIGH (ref 3.8–10.5)
WBC # FLD AUTO: 11.84 K/UL — HIGH (ref 3.8–10.5)
WBC # FLD AUTO: 11.84 K/UL — HIGH (ref 3.8–10.5)

## 2024-01-08 ENCOUNTER — INPATIENT (INPATIENT)
Facility: HOSPITAL | Age: 29
LOS: 2 days | Discharge: HOME | End: 2024-01-11
Attending: OBSTETRICS & GYNECOLOGY | Admitting: OBSTETRICS & GYNECOLOGY
Payer: COMMERCIAL

## 2024-01-08 VITALS
HEART RATE: 84 BPM | SYSTOLIC BLOOD PRESSURE: 136 MMHG | DIASTOLIC BLOOD PRESSURE: 99 MMHG | TEMPERATURE: 98 F | RESPIRATION RATE: 16 BRPM | HEIGHT: 63 IN | WEIGHT: 167.99 LBS | OXYGEN SATURATION: 99 %

## 2024-01-08 DIAGNOSIS — Z87.442 PERSONAL HISTORY OF URINARY CALCULI: Chronic | ICD-10-CM

## 2024-01-08 DIAGNOSIS — Z98.890 OTHER SPECIFIED POSTPROCEDURAL STATES: Chronic | ICD-10-CM

## 2024-01-08 DIAGNOSIS — F43.10 POST-TRAUMATIC STRESS DISORDER, UNSPECIFIED: ICD-10-CM

## 2024-01-08 DIAGNOSIS — F32.A DEPRESSION, UNSPECIFIED: ICD-10-CM

## 2024-01-08 DIAGNOSIS — O21.2 LATE VOMITING OF PREGNANCY: ICD-10-CM

## 2024-01-08 DIAGNOSIS — O47.1 FALSE LABOR AT OR AFTER 37 COMPLETED WEEKS OF GESTATION: ICD-10-CM

## 2024-01-08 DIAGNOSIS — Z90.49 ACQUIRED ABSENCE OF OTHER SPECIFIED PARTS OF DIGESTIVE TRACT: Chronic | ICD-10-CM

## 2024-01-08 DIAGNOSIS — O99.343 OTHER MENTAL DISORDERS COMPLICATING PREGNANCY, THIRD TRIMESTER: ICD-10-CM

## 2024-01-08 DIAGNOSIS — Z3A.38 38 WEEKS GESTATION OF PREGNANCY: ICD-10-CM

## 2024-01-08 DIAGNOSIS — O26.899 OTHER SPECIFIED PREGNANCY RELATED CONDITIONS, UNSPECIFIED TRIMESTER: ICD-10-CM

## 2024-01-08 DIAGNOSIS — F41.9 ANXIETY DISORDER, UNSPECIFIED: ICD-10-CM

## 2024-01-08 DIAGNOSIS — O35.8XX0 MATERNAL CARE FOR OTHER (SUSPECTED) FETAL ABNORMALITY AND DAMAGE, NOT APPLICABLE OR UNSPECIFIED: ICD-10-CM

## 2024-01-08 DIAGNOSIS — O09.813 SUPERVISION OF PREGNANCY RESULTING FROM ASSISTED REPRODUCTIVE TECHNOLOGY, THIRD TRIMESTER: ICD-10-CM

## 2024-01-08 DIAGNOSIS — Z20.822 CONTACT WITH AND (SUSPECTED) EXPOSURE TO COVID-19: ICD-10-CM

## 2024-01-08 LAB
ALBUMIN SERPL ELPH-MCNC: 2.4 G/DL — LOW (ref 3.3–5)
ALBUMIN SERPL ELPH-MCNC: 2.4 G/DL — LOW (ref 3.3–5)
ALP SERPL-CCNC: 138 U/L — HIGH (ref 40–120)
ALP SERPL-CCNC: 138 U/L — HIGH (ref 40–120)
ALT FLD-CCNC: 20 U/L — SIGNIFICANT CHANGE UP (ref 12–78)
ALT FLD-CCNC: 20 U/L — SIGNIFICANT CHANGE UP (ref 12–78)
ANION GAP SERPL CALC-SCNC: 7 MMOL/L — SIGNIFICANT CHANGE UP (ref 5–17)
ANION GAP SERPL CALC-SCNC: 7 MMOL/L — SIGNIFICANT CHANGE UP (ref 5–17)
APTT BLD: 24.1 SEC — LOW (ref 24.5–35.6)
APTT BLD: 24.1 SEC — LOW (ref 24.5–35.6)
AST SERPL-CCNC: 17 U/L — SIGNIFICANT CHANGE UP (ref 15–37)
AST SERPL-CCNC: 17 U/L — SIGNIFICANT CHANGE UP (ref 15–37)
BASOPHILS # BLD AUTO: 0.04 K/UL — SIGNIFICANT CHANGE UP (ref 0–0.2)
BASOPHILS # BLD AUTO: 0.04 K/UL — SIGNIFICANT CHANGE UP (ref 0–0.2)
BASOPHILS NFR BLD AUTO: 0.4 % — SIGNIFICANT CHANGE UP (ref 0–2)
BASOPHILS NFR BLD AUTO: 0.4 % — SIGNIFICANT CHANGE UP (ref 0–2)
BILIRUB SERPL-MCNC: 0.4 MG/DL — SIGNIFICANT CHANGE UP (ref 0.2–1.2)
BILIRUB SERPL-MCNC: 0.4 MG/DL — SIGNIFICANT CHANGE UP (ref 0.2–1.2)
BUN SERPL-MCNC: 10 MG/DL — SIGNIFICANT CHANGE UP (ref 7–23)
BUN SERPL-MCNC: 10 MG/DL — SIGNIFICANT CHANGE UP (ref 7–23)
CALCIUM SERPL-MCNC: 9.1 MG/DL — SIGNIFICANT CHANGE UP (ref 8.5–10.1)
CALCIUM SERPL-MCNC: 9.1 MG/DL — SIGNIFICANT CHANGE UP (ref 8.5–10.1)
CHLORIDE SERPL-SCNC: 110 MMOL/L — HIGH (ref 96–108)
CHLORIDE SERPL-SCNC: 110 MMOL/L — HIGH (ref 96–108)
CO2 SERPL-SCNC: 22 MMOL/L — SIGNIFICANT CHANGE UP (ref 22–31)
CO2 SERPL-SCNC: 22 MMOL/L — SIGNIFICANT CHANGE UP (ref 22–31)
CREAT ?TM UR-MCNC: 31 MG/DL — SIGNIFICANT CHANGE UP
CREAT ?TM UR-MCNC: 31 MG/DL — SIGNIFICANT CHANGE UP
CREAT SERPL-MCNC: 0.61 MG/DL — SIGNIFICANT CHANGE UP (ref 0.5–1.3)
CREAT SERPL-MCNC: 0.61 MG/DL — SIGNIFICANT CHANGE UP (ref 0.5–1.3)
EGFR: 123 ML/MIN/1.73M2 — SIGNIFICANT CHANGE UP
EGFR: 123 ML/MIN/1.73M2 — SIGNIFICANT CHANGE UP
EOSINOPHIL # BLD AUTO: 0.46 K/UL — SIGNIFICANT CHANGE UP (ref 0–0.5)
EOSINOPHIL # BLD AUTO: 0.46 K/UL — SIGNIFICANT CHANGE UP (ref 0–0.5)
EOSINOPHIL NFR BLD AUTO: 4.1 % — SIGNIFICANT CHANGE UP (ref 0–6)
EOSINOPHIL NFR BLD AUTO: 4.1 % — SIGNIFICANT CHANGE UP (ref 0–6)
FIBRINOGEN PPP-MCNC: 513 MG/DL — HIGH (ref 200–435)
FIBRINOGEN PPP-MCNC: 513 MG/DL — HIGH (ref 200–435)
GLUCOSE SERPL-MCNC: 81 MG/DL — SIGNIFICANT CHANGE UP (ref 70–99)
GLUCOSE SERPL-MCNC: 81 MG/DL — SIGNIFICANT CHANGE UP (ref 70–99)
HCT VFR BLD CALC: 33 % — LOW (ref 34.5–45)
HCT VFR BLD CALC: 33 % — LOW (ref 34.5–45)
HGB BLD-MCNC: 11.4 G/DL — LOW (ref 11.5–15.5)
HGB BLD-MCNC: 11.4 G/DL — LOW (ref 11.5–15.5)
IMM GRANULOCYTES NFR BLD AUTO: 0.5 % — SIGNIFICANT CHANGE UP (ref 0–0.9)
IMM GRANULOCYTES NFR BLD AUTO: 0.5 % — SIGNIFICANT CHANGE UP (ref 0–0.9)
INR BLD: 0.88 RATIO — SIGNIFICANT CHANGE UP (ref 0.85–1.18)
INR BLD: 0.88 RATIO — SIGNIFICANT CHANGE UP (ref 0.85–1.18)
LDH SERPL L TO P-CCNC: 127 U/L — SIGNIFICANT CHANGE UP (ref 84–241)
LDH SERPL L TO P-CCNC: 127 U/L — SIGNIFICANT CHANGE UP (ref 84–241)
LYMPHOCYTES # BLD AUTO: 27.6 % — SIGNIFICANT CHANGE UP (ref 13–44)
LYMPHOCYTES # BLD AUTO: 27.6 % — SIGNIFICANT CHANGE UP (ref 13–44)
LYMPHOCYTES # BLD AUTO: 3.13 K/UL — SIGNIFICANT CHANGE UP (ref 1–3.3)
LYMPHOCYTES # BLD AUTO: 3.13 K/UL — SIGNIFICANT CHANGE UP (ref 1–3.3)
MCHC RBC-ENTMCNC: 31.1 PG — SIGNIFICANT CHANGE UP (ref 27–34)
MCHC RBC-ENTMCNC: 31.1 PG — SIGNIFICANT CHANGE UP (ref 27–34)
MCHC RBC-ENTMCNC: 34.5 GM/DL — SIGNIFICANT CHANGE UP (ref 32–36)
MCHC RBC-ENTMCNC: 34.5 GM/DL — SIGNIFICANT CHANGE UP (ref 32–36)
MCV RBC AUTO: 90.2 FL — SIGNIFICANT CHANGE UP (ref 80–100)
MCV RBC AUTO: 90.2 FL — SIGNIFICANT CHANGE UP (ref 80–100)
MONOCYTES # BLD AUTO: 0.89 K/UL — SIGNIFICANT CHANGE UP (ref 0–0.9)
MONOCYTES # BLD AUTO: 0.89 K/UL — SIGNIFICANT CHANGE UP (ref 0–0.9)
MONOCYTES NFR BLD AUTO: 7.9 % — SIGNIFICANT CHANGE UP (ref 2–14)
MONOCYTES NFR BLD AUTO: 7.9 % — SIGNIFICANT CHANGE UP (ref 2–14)
NEUTROPHILS # BLD AUTO: 6.75 K/UL — SIGNIFICANT CHANGE UP (ref 1.8–7.4)
NEUTROPHILS # BLD AUTO: 6.75 K/UL — SIGNIFICANT CHANGE UP (ref 1.8–7.4)
NEUTROPHILS NFR BLD AUTO: 59.5 % — SIGNIFICANT CHANGE UP (ref 43–77)
NEUTROPHILS NFR BLD AUTO: 59.5 % — SIGNIFICANT CHANGE UP (ref 43–77)
PLATELET # BLD AUTO: 279 K/UL — SIGNIFICANT CHANGE UP (ref 150–400)
PLATELET # BLD AUTO: 279 K/UL — SIGNIFICANT CHANGE UP (ref 150–400)
POTASSIUM SERPL-MCNC: 4 MMOL/L — SIGNIFICANT CHANGE UP (ref 3.5–5.3)
POTASSIUM SERPL-MCNC: 4 MMOL/L — SIGNIFICANT CHANGE UP (ref 3.5–5.3)
POTASSIUM SERPL-SCNC: 4 MMOL/L — SIGNIFICANT CHANGE UP (ref 3.5–5.3)
POTASSIUM SERPL-SCNC: 4 MMOL/L — SIGNIFICANT CHANGE UP (ref 3.5–5.3)
PROT ?TM UR-MCNC: 9 MG/DL — SIGNIFICANT CHANGE UP (ref 0–12)
PROT ?TM UR-MCNC: 9 MG/DL — SIGNIFICANT CHANGE UP (ref 0–12)
PROT SERPL-MCNC: 6.3 GM/DL — SIGNIFICANT CHANGE UP (ref 6–8.3)
PROT SERPL-MCNC: 6.3 GM/DL — SIGNIFICANT CHANGE UP (ref 6–8.3)
PROT/CREAT UR-RTO: 0.3 RATIO — HIGH (ref 0–0.2)
PROT/CREAT UR-RTO: 0.3 RATIO — HIGH (ref 0–0.2)
PROTHROM AB SERPL-ACNC: 10 SEC — SIGNIFICANT CHANGE UP (ref 9.5–13)
PROTHROM AB SERPL-ACNC: 10 SEC — SIGNIFICANT CHANGE UP (ref 9.5–13)
RBC # BLD: 3.66 M/UL — LOW (ref 3.8–5.2)
RBC # BLD: 3.66 M/UL — LOW (ref 3.8–5.2)
RBC # FLD: 13.5 % — SIGNIFICANT CHANGE UP (ref 10.3–14.5)
RBC # FLD: 13.5 % — SIGNIFICANT CHANGE UP (ref 10.3–14.5)
SODIUM SERPL-SCNC: 139 MMOL/L — SIGNIFICANT CHANGE UP (ref 135–145)
SODIUM SERPL-SCNC: 139 MMOL/L — SIGNIFICANT CHANGE UP (ref 135–145)
T PALLIDUM AB TITR SER: NEGATIVE — SIGNIFICANT CHANGE UP
T PALLIDUM AB TITR SER: NEGATIVE — SIGNIFICANT CHANGE UP
URATE SERPL-MCNC: 4.5 MG/DL — SIGNIFICANT CHANGE UP (ref 2.5–7)
URATE SERPL-MCNC: 4.5 MG/DL — SIGNIFICANT CHANGE UP (ref 2.5–7)
WBC # BLD: 11.33 K/UL — HIGH (ref 3.8–10.5)
WBC # BLD: 11.33 K/UL — HIGH (ref 3.8–10.5)
WBC # FLD AUTO: 11.33 K/UL — HIGH (ref 3.8–10.5)
WBC # FLD AUTO: 11.33 K/UL — HIGH (ref 3.8–10.5)

## 2024-01-08 PROCEDURE — 86780 TREPONEMA PALLIDUM: CPT

## 2024-01-08 PROCEDURE — 84550 ASSAY OF BLOOD/URIC ACID: CPT

## 2024-01-08 PROCEDURE — 85025 COMPLETE CBC W/AUTO DIFF WBC: CPT

## 2024-01-08 PROCEDURE — 80053 COMPREHEN METABOLIC PANEL: CPT

## 2024-01-08 PROCEDURE — 83615 LACTATE (LD) (LDH) ENZYME: CPT

## 2024-01-08 PROCEDURE — 85730 THROMBOPLASTIN TIME PARTIAL: CPT

## 2024-01-08 PROCEDURE — 85610 PROTHROMBIN TIME: CPT

## 2024-01-08 PROCEDURE — 85384 FIBRINOGEN ACTIVITY: CPT

## 2024-01-08 PROCEDURE — 59050 FETAL MONITOR W/REPORT: CPT

## 2024-01-08 PROCEDURE — 86901 BLOOD TYPING SEROLOGIC RH(D): CPT

## 2024-01-08 PROCEDURE — 86850 RBC ANTIBODY SCREEN: CPT

## 2024-01-08 PROCEDURE — 82570 ASSAY OF URINE CREATININE: CPT

## 2024-01-08 PROCEDURE — 86900 BLOOD TYPING SEROLOGIC ABO: CPT

## 2024-01-08 PROCEDURE — 36415 COLL VENOUS BLD VENIPUNCTURE: CPT

## 2024-01-08 PROCEDURE — 94760 N-INVAS EAR/PLS OXIMETRY 1: CPT

## 2024-01-08 PROCEDURE — 84156 ASSAY OF PROTEIN URINE: CPT

## 2024-01-08 RX ORDER — ACETAMINOPHEN 500 MG
975 TABLET ORAL
Refills: 0 | Status: DISCONTINUED | OUTPATIENT
Start: 2024-01-08 | End: 2024-01-11

## 2024-01-08 RX ORDER — LANOLIN
1 OINTMENT (GRAM) TOPICAL EVERY 6 HOURS
Refills: 0 | Status: DISCONTINUED | OUTPATIENT
Start: 2024-01-08 | End: 2024-01-11

## 2024-01-08 RX ORDER — MORPHINE SULFATE 50 MG/1
0.1 CAPSULE, EXTENDED RELEASE ORAL ONCE
Refills: 0 | Status: DISCONTINUED | OUTPATIENT
Start: 2024-01-08 | End: 2024-01-11

## 2024-01-08 RX ORDER — IBUPROFEN 200 MG
600 TABLET ORAL EVERY 6 HOURS
Refills: 0 | Status: COMPLETED | OUTPATIENT
Start: 2024-01-08 | End: 2024-12-06

## 2024-01-08 RX ORDER — KETOROLAC TROMETHAMINE 30 MG/ML
30 SYRINGE (ML) INJECTION EVERY 6 HOURS
Refills: 0 | Status: DISCONTINUED | OUTPATIENT
Start: 2024-01-08 | End: 2024-01-09

## 2024-01-08 RX ORDER — OXYTOCIN 10 UNIT/ML
333.33 VIAL (ML) INJECTION
Qty: 20 | Refills: 0 | Status: DISCONTINUED | OUTPATIENT
Start: 2024-01-08 | End: 2024-01-11

## 2024-01-08 RX ORDER — ONDANSETRON 8 MG/1
4 TABLET, FILM COATED ORAL EVERY 6 HOURS
Refills: 0 | Status: DISCONTINUED | OUTPATIENT
Start: 2024-01-08 | End: 2024-01-11

## 2024-01-08 RX ORDER — ENOXAPARIN SODIUM 100 MG/ML
40 INJECTION SUBCUTANEOUS EVERY 24 HOURS
Refills: 0 | Status: DISCONTINUED | OUTPATIENT
Start: 2024-01-08 | End: 2024-01-11

## 2024-01-08 RX ORDER — HYDROMORPHONE HYDROCHLORIDE 2 MG/ML
0.5 INJECTION INTRAMUSCULAR; INTRAVENOUS; SUBCUTANEOUS
Refills: 0 | Status: DISCONTINUED | OUTPATIENT
Start: 2024-01-08 | End: 2024-01-08

## 2024-01-08 RX ORDER — SODIUM CHLORIDE 9 MG/ML
1000 INJECTION, SOLUTION INTRAVENOUS
Refills: 0 | Status: DISCONTINUED | OUTPATIENT
Start: 2024-01-08 | End: 2024-01-11

## 2024-01-08 RX ORDER — SIMETHICONE 80 MG/1
80 TABLET, CHEWABLE ORAL EVERY 4 HOURS
Refills: 0 | Status: DISCONTINUED | OUTPATIENT
Start: 2024-01-08 | End: 2024-01-11

## 2024-01-08 RX ORDER — SODIUM CHLORIDE 9 MG/ML
1000 INJECTION, SOLUTION INTRAVENOUS ONCE
Refills: 0 | Status: COMPLETED | OUTPATIENT
Start: 2024-01-08 | End: 2024-01-08

## 2024-01-08 RX ORDER — TETANUS TOXOID, REDUCED DIPHTHERIA TOXOID AND ACELLULAR PERTUSSIS VACCINE, ADSORBED 5; 2.5; 8; 8; 2.5 [IU]/.5ML; [IU]/.5ML; UG/.5ML; UG/.5ML; UG/.5ML
0.5 SUSPENSION INTRAMUSCULAR ONCE
Refills: 0 | Status: DISCONTINUED | OUTPATIENT
Start: 2024-01-08 | End: 2024-01-11

## 2024-01-08 RX ORDER — OXYCODONE HYDROCHLORIDE 5 MG/1
5 TABLET ORAL
Refills: 0 | Status: COMPLETED | OUTPATIENT
Start: 2024-01-08 | End: 2024-01-15

## 2024-01-08 RX ORDER — ACETAMINOPHEN 500 MG
1000 TABLET ORAL ONCE
Refills: 0 | Status: COMPLETED | OUTPATIENT
Start: 2024-01-08 | End: 2024-01-08

## 2024-01-08 RX ORDER — FAMOTIDINE 10 MG/ML
20 INJECTION INTRAVENOUS ONCE
Refills: 0 | Status: COMPLETED | OUTPATIENT
Start: 2024-01-08 | End: 2024-01-08

## 2024-01-08 RX ORDER — SODIUM CHLORIDE 9 MG/ML
1000 INJECTION, SOLUTION INTRAVENOUS
Refills: 0 | Status: DISCONTINUED | OUTPATIENT
Start: 2024-01-08 | End: 2024-01-08

## 2024-01-08 RX ORDER — NALOXONE HYDROCHLORIDE 4 MG/.1ML
0.1 SPRAY NASAL
Refills: 0 | Status: DISCONTINUED | OUTPATIENT
Start: 2024-01-08 | End: 2024-01-11

## 2024-01-08 RX ORDER — OXYCODONE HYDROCHLORIDE 5 MG/1
5 TABLET ORAL ONCE
Refills: 0 | Status: DISCONTINUED | OUTPATIENT
Start: 2024-01-08 | End: 2024-01-11

## 2024-01-08 RX ORDER — CITRIC ACID/SODIUM CITRATE 300-500 MG
30 SOLUTION, ORAL ORAL ONCE
Refills: 0 | Status: COMPLETED | OUTPATIENT
Start: 2024-01-08 | End: 2024-01-08

## 2024-01-08 RX ORDER — KETOROLAC TROMETHAMINE 30 MG/ML
30 SYRINGE (ML) INJECTION ONCE
Refills: 0 | Status: DISCONTINUED | OUTPATIENT
Start: 2024-01-08 | End: 2024-01-08

## 2024-01-08 RX ORDER — METOCLOPRAMIDE HCL 10 MG
10 TABLET ORAL ONCE
Refills: 0 | Status: COMPLETED | OUTPATIENT
Start: 2024-01-08 | End: 2024-01-08

## 2024-01-08 RX ORDER — MAGNESIUM HYDROXIDE 400 MG/1
30 TABLET, CHEWABLE ORAL
Refills: 0 | Status: DISCONTINUED | OUTPATIENT
Start: 2024-01-08 | End: 2024-01-11

## 2024-01-08 RX ORDER — DIPHENHYDRAMINE HCL 50 MG
25 CAPSULE ORAL EVERY 6 HOURS
Refills: 0 | Status: DISCONTINUED | OUTPATIENT
Start: 2024-01-08 | End: 2024-01-11

## 2024-01-08 RX ADMIN — ONDANSETRON 4 MILLIGRAM(S): 8 TABLET, FILM COATED ORAL at 18:13

## 2024-01-08 RX ADMIN — HYDROMORPHONE HYDROCHLORIDE 0.5 MILLIGRAM(S): 2 INJECTION INTRAMUSCULAR; INTRAVENOUS; SUBCUTANEOUS at 10:35

## 2024-01-08 RX ADMIN — Medication 1000 MILLIGRAM(S): at 12:41

## 2024-01-08 RX ADMIN — Medication 30 MILLILITER(S): at 08:41

## 2024-01-08 RX ADMIN — Medication 975 MILLIGRAM(S): at 21:01

## 2024-01-08 RX ADMIN — Medication 400 MILLIGRAM(S): at 11:30

## 2024-01-08 RX ADMIN — ENOXAPARIN SODIUM 40 MILLIGRAM(S): 100 INJECTION SUBCUTANEOUS at 22:17

## 2024-01-08 RX ADMIN — Medication 30 MILLIGRAM(S): at 10:41

## 2024-01-08 RX ADMIN — SODIUM CHLORIDE 2000 MILLILITER(S): 9 INJECTION, SOLUTION INTRAVENOUS at 08:41

## 2024-01-08 RX ADMIN — ONDANSETRON 4 MILLIGRAM(S): 8 TABLET, FILM COATED ORAL at 11:30

## 2024-01-08 RX ADMIN — Medication 10 MILLIGRAM(S): at 12:18

## 2024-01-08 RX ADMIN — Medication 30 MILLIGRAM(S): at 15:45

## 2024-01-08 RX ADMIN — HYDROMORPHONE HYDROCHLORIDE 0.5 MILLIGRAM(S): 2 INJECTION INTRAMUSCULAR; INTRAVENOUS; SUBCUTANEOUS at 10:41

## 2024-01-08 RX ADMIN — Medication 30 MILLIGRAM(S): at 10:33

## 2024-01-08 RX ADMIN — FAMOTIDINE 20 MILLIGRAM(S): 10 INJECTION INTRAVENOUS at 08:42

## 2024-01-08 RX ADMIN — Medication 30 MILLIGRAM(S): at 16:30

## 2024-01-08 RX ADMIN — Medication 975 MILLIGRAM(S): at 22:00

## 2024-01-08 RX ADMIN — Medication 1000 MILLIUNIT(S)/MIN: at 11:46

## 2024-01-08 RX ADMIN — Medication 10 MILLIGRAM(S): at 22:17

## 2024-01-08 RX ADMIN — Medication 30 MILLIGRAM(S): at 23:22

## 2024-01-08 NOTE — OB PROVIDER H&P - NSHPREVIEWOFSYSTEMS_GEN_ALL_CORE
CONSTITUTIONAL: denies fever, chills, diaphoresis, fatigue, weakness, dizziness, lightheadedness  HEENT: denies blurred vision, sore throat, cough  SKIN: denies new lesions, rash, hives, itching  CARDIOVASCULAR: denies chest pain, chest pressure, palpitations  RESPIRATORY: denies shortness of breath, LOYA, wheezing, sputum production  GASTROINTESTINAL: denies vomiting, diarrhea, constipation, abdominal pain, hematochezia, melena; + nausea   GENITOURINARY: denies dysuria, polyuria, hematuria, discharge  NEUROLOGICAL: denies syncope, LOC, numbness, headache, focal weakness  MUSCULOSKELETAL: denies new joint pain, joint swelling, muscle aches  HEMATOLOGIC: denies gross bleeding, bruising  LYMPHATICS: denies enlarged lymph nodes, extremity swelling  PSYCHIATRIC: denies recent changes in anxiety, depression  ENDOCRINOLOGIC: denies sweating, cold or heat intolerance

## 2024-01-08 NOTE — OB PROVIDER H&P - ASSESSMENT
A/P: 27 y/o G 1 P 0 @ 38wks 4 days admitted for scheduled CS   - Admit to L&D  - Routine labs, IVF, NPO  - Reglan/Pepcid/Bicitra  - Abdominal prep, PAS, jane to bedside drainage  - EFM: reactive  - GBS: unknown   - Anesthesia consult  - Dr. Mendoza aware     A/P: 29 y/o G 1 P 0 @ 38wks 4 days admitted for scheduled CS   - Admit to L&D  - Routine labs, IVF, NPO  - Reglan/Pepcid/Bicitra  - Abdominal prep, PAS, jane to bedside drainage  - EFM: reactive  - GBS: unknown   - Anesthesia consult  - Dr. Mendoza aware     A/P: 27 y/o G 1 P 0 @ 39wks 1 day admitted for scheduled CS   - Admit to L&D  - Routine labs, IVF, NPO  - Reglan/Pepcid/Bicitra  - Abdominal prep, PAS, jane to bedside drainage  - EFM: reactive  - GBS: unknown   - Anesthesia consult  - Dr. Mendoza aware     A/P: 29 y/o G 1 P 0 @ 39wks 1 day admitted for scheduled CS   - Admit to L&D  - Routine labs, IVF, NPO  - Reglan/Pepcid/Bicitra  - Abdominal prep, PAS, jane to bedside drainage  - EFM: reactive  - GBS: unknown   - Anesthesia consult  - Dr. Mendoza aware

## 2024-01-08 NOTE — OB PROVIDER DELIVERY SUMMARY - NSLOWPPHRISK_OBGYN_A_OB
No previous uterine incision/Ng Pregnancy/Less than or equal to 4 previous vaginal births/No known bleeding disorder/No history of postpartum hemorrhage/No other PPH risks indicated

## 2024-01-08 NOTE — OB PROVIDER H&P - HISTORY OF PRESENT ILLNESS
OB Admission Note    27 y/o G 1 P 0 @ 38wks 4d (JOSE LUIS 2024 ) admitted for scheduled . Patient reports nausea, denies vomiting. Denies LOF, VB. +FM. GBS unknown. EFW     PNC: PLT @ 30weeks  ObHx: Primigravida   GynHx: Denies hx of fibroids, ovarian cysts, abnml PAP smears, STDs  MedHx: +anxiety, + seizures in 2020 2/2 to anxiety medication interaction, not currently on seizure meds. Denies hx of HTN, DM, asthma, thyroid problems, blood clots/bleeding problems, hx of blood transfusions  Meds: PNV, folic acid, diclegis   All: NKDA  PSHx: Denies  FHx: Denies hx of blood clots/bleeding problems  Social: Denies alcohol/tobacco/drug use in pregnancy  Psych: Denies hx of depression   OB Admission Note    27 y/o G 1 P 0 @ 39 weeks 1 day admitted for scheduled . Patient reports nausea, denies vomiting. Denies LOF, VB. +FM. GBS unknown. EFW     PNC: PLT @ 30weeks  ObHx: Primigravida   GynHx: Denies hx of fibroids, ovarian cysts, abnml PAP smears, STDs  MedHx: +anxiety, + seizures in 2020 to anxiety medication interaction, not currently on seizure meds. Denies hx of HTN, DM, asthma, thyroid problems, blood clots/bleeding problems, hx of blood transfusions  Meds: PNV, folic acid, diclegis   All: NKDA  PSHx: Denies  FHx: Denies hx of blood clots/bleeding problems  Social: Denies alcohol/tobacco/drug use in pregnancy  Psych: Denies hx of depression

## 2024-01-08 NOTE — OB RN PATIENT PROFILE - FLU SEASON?
----- Message from Osbaldo Cook MD sent at 8/26/2019 11:25 AM CDT -----  Please let her know all her labs are good. Her HIV RNA is UD and CD4 is 1419. Iron (ferritin) is normal   Yes...

## 2024-01-08 NOTE — OB PROVIDER H&P - ABORTIONS, OB PROFILE
0 Gen: AAOx1, non-toxic  Head: NCAT  HEENT: EOMI, oral mucosa moist, normal conjunctiva  Lung: CTAB, no respiratory distress, no wheezes/rhonchi/rales B/L, speaking in full sentences  CV: RRR, no murmurs, rubs or gallops  Abd: soft, NTND, no guarding, no CVA tenderness  MSK: no midline spinal TTP, no pelvic TTP or instability, no visible deformities  Neuro: No focal sensory or motor deficits, normal CN exam   Skin: Warm, well perfused, no rash  Psych: normal affect.     Paul He, DO

## 2024-01-08 NOTE — OB RN DELIVERY SUMMARY - NS_BIRTHTRAUMAA_OBGYN_ALL_OB
Pt called and is having ear drainage, will send in refill of the Tobradex that she used last time.  
None

## 2024-01-08 NOTE — OB PROVIDER H&P - ATTENDING COMMENTS
Patient seen and assessed by me. Agree with above assessment and plan.   Primary elective  section - patient extensively counseled. Long history of post-traumatic stress disorder and severe anxiety.   All questions answered.   Consent signed.

## 2024-01-08 NOTE — OB RN PATIENT PROFILE - LIMIT VISITORS, INFANT PROFILE
HPI:    50 year old White male presents with a tender lump overlying the dorsal aspect of the left second DIP joint of the hands    Patient resents with a one-month history of a tender cystic type, superficial lump on the dorsum of his left hand, specifically the radial aspect of the dorsal side of the DIP joint. It is very tender. He tried to poke it the other day was not able to express any things. No previous injury. He has no history of underlying arthritis.    I have reviewed the patient's medications and allergies, past medical, surgical, social and family history, updating these as appropriate.  See Histories section of the EMR for a display of this information.    The remainder of the 15-point review of systems is negative except as outlined in the HPI.    OBJECTIVE:    There were no vitals taken for this visit.  General:  Alert, in No apparent distress. Mood and affect appropriate.    Left second finger: 6 mm raised cystic nodule overlying the dorsal DIP joint    ASSESSMENT/PLAN:    Digital mucous cyst. Talked about treatment options. He wanted to proceed with unroofing this followed by a trial therapy. With his permission, we perform digital block using 1% plain lidocaine. Cyst was gently unroofed and gelatinous material expressed. Cryotherapy was performed utilizing liquid nitrogen. Sterile dressing was applied. He's been educated routine wound care. Continue to monitor symptoms and return if signs or symptoms of infection or recurrence of the cyst.        no

## 2024-01-08 NOTE — OB PROVIDER DELIVERY SUMMARY - NSPROVIDERDELIVERYNOTE_OBGYN_ALL_OB_FT
Uncomplicated primary LTCS viable female , apgars 8/9. Infant initially floating and vacuum assist was used to facilitate delivery.   Grossly normal uterus, tubes and ovaries b/l.

## 2024-01-08 NOTE — OB RN DELIVERY SUMMARY - NSSELHIDDEN_OBGYN_ALL_OB_FT
[NS_DeliveryAttending1_OBGYN_ALL_OB_FT:[id number]==],[NS_DeliveryRN_OBGYN_ALL_OB_FT:[id number]=] [NS_DeliveryAttending1_OBGYN_ALL_OB_FT:[id number]==],[NS_DeliveryRN_OBGYN_ALL_OB_FT:[id number]=]

## 2024-01-08 NOTE — OB RN PATIENT PROFILE - NSSDOHPUBLICBEN_OBGYN_A_OB
Feeling well. No ctx, lof, bleedng. +FM.   In school, will be in school until delivery.   Discussed influenza vaccine.   
I do not need help with these

## 2024-01-08 NOTE — OB RN DELIVERY SUMMARY - NS_SEPSISRSKCALC_OBGYN_ALL_OB_FT
EOS calculated successfully. EOS Risk Factor: 0.5/1000 live births ([facility] national incidence); GA=40w2d; Temp=98.2; ROM=0; GBS='Unknown'; Antibiotics='No antibiotics or any antibiotics < 2 hrs prior to birth'   EOS calculated successfully. EOS Risk Factor: 0.5/1000 live births ([facility] national incidence); GA=40w2d; Temp=98.2; ROM=0; GBS='Unknown'; Antibiotics='No antibiotics or any antibiotics < 2 hrs prior to birth'

## 2024-01-08 NOTE — OB RN PATIENT PROFILE - FALL HARM RISK - UNIVERSAL INTERVENTIONS
Bed in lowest position, wheels locked, appropriate side rails in place/Call bell, personal items and telephone in reach/Instruct patient to call for assistance before getting out of bed or chair/Non-slip footwear when patient is out of bed/[place] to call system/Physically safe environment - no spills, clutter or unnecessary equipment/Purposeful Proactive Rounding/Room/bathroom lighting operational, light cord in reach Bed in lowest position, wheels locked, appropriate side rails in place/Call bell, personal items and telephone in reach/Instruct patient to call for assistance before getting out of bed or chair/Non-slip footwear when patient is out of bed/[place] to call system/Physically safe environment - no spills, clutter or unnecessary equipment/Purposeful Proactive Rounding/Room/bathroom lighting operational, light cord in reach

## 2024-01-08 NOTE — OB RN INTRAOPERATIVE NOTE - NSSELHIDDEN_OBGYN_ALL_OB_FT
[NS_DeliveryAttending1_OBGYN_ALL_OB_FT:[id number]==],[NS_DeliveryRN_OBGYN_ALL_OB_FT:[id number]=] [NS_DeliveryAttending1_OBGYN_ALL_OB_FT:[id number]==],[NS_DeliveryRN_OBGYN_ALL_OB_FT:[id number]] [NS_DeliveryAttending1_OBGYN_ALL_OB_FT:[id number]==],[NS_DeliveryRN_OBGYN_ALL_OB_FT:[id number]=],[NS_DeliveryAssist1_OBGYN_ALL_OB_FT:[id number]=] [NS_DeliveryAttending1_OBGYN_ALL_OB_FT:[id number]==],[NS_DeliveryRN_OBGYN_ALL_OB_FT:[id number]=],[NS_DeliveryAssist1_OBGYN_ALL_OB_FT:[id number]=]

## 2024-01-08 NOTE — OB RN DELIVERY SUMMARY - NS_PROPHYLABX_OBGYN_ALL_OB
5/18/2018    Call Regarding Onboarding [employer]    Attempt 2    Message with male    Comments: 0 DEP      Outreach   CC       Yes

## 2024-01-08 NOTE — OB PROVIDER DELIVERY SUMMARY - NSSELHIDDEN_OBGYN_ALL_OB_FT
[NS_DeliveryAttending1_OBGYN_ALL_OB_FT:[id number]==],[NS_DeliveryRN_OBGYN_ALL_OB_FT:[id number]=] [NS_DeliveryAttending1_OBGYN_ALL_OB_FT:[id number]==],[NS_DeliveryRN_OBGYN_ALL_OB_FT:[id number]=]

## 2024-01-08 NOTE — OB RN PATIENT PROFILE - NS_PRENATALLABSOURCEGBSBACTPN_OBGYN_ALL_OB
[FreeTextEntry1] : Patient returns after his first LESI a few weeks ago.  He states that he feels 30% improved as compared to prior to his procedure.  He is here to discuss his progress. [Follow-Up: _____] : a [unfilled] follow-up visit unknown

## 2024-01-09 LAB
ALBUMIN SERPL ELPH-MCNC: 2.2 G/DL — LOW (ref 3.3–5)
ALBUMIN SERPL ELPH-MCNC: 2.2 G/DL — LOW (ref 3.3–5)
ALP SERPL-CCNC: 126 U/L — HIGH (ref 40–120)
ALP SERPL-CCNC: 126 U/L — HIGH (ref 40–120)
ALT FLD-CCNC: 16 U/L — SIGNIFICANT CHANGE UP (ref 12–78)
ALT FLD-CCNC: 16 U/L — SIGNIFICANT CHANGE UP (ref 12–78)
ANION GAP SERPL CALC-SCNC: 3 MMOL/L — LOW (ref 5–17)
ANION GAP SERPL CALC-SCNC: 3 MMOL/L — LOW (ref 5–17)
AST SERPL-CCNC: 26 U/L — SIGNIFICANT CHANGE UP (ref 15–37)
AST SERPL-CCNC: 26 U/L — SIGNIFICANT CHANGE UP (ref 15–37)
BASOPHILS # BLD AUTO: 0.03 K/UL — SIGNIFICANT CHANGE UP (ref 0–0.2)
BASOPHILS # BLD AUTO: 0.03 K/UL — SIGNIFICANT CHANGE UP (ref 0–0.2)
BASOPHILS NFR BLD AUTO: 0.3 % — SIGNIFICANT CHANGE UP (ref 0–2)
BASOPHILS NFR BLD AUTO: 0.3 % — SIGNIFICANT CHANGE UP (ref 0–2)
BILIRUB SERPL-MCNC: 0.5 MG/DL — SIGNIFICANT CHANGE UP (ref 0.2–1.2)
BILIRUB SERPL-MCNC: 0.5 MG/DL — SIGNIFICANT CHANGE UP (ref 0.2–1.2)
BUN SERPL-MCNC: 7 MG/DL — SIGNIFICANT CHANGE UP (ref 7–23)
BUN SERPL-MCNC: 7 MG/DL — SIGNIFICANT CHANGE UP (ref 7–23)
CALCIUM SERPL-MCNC: 8.6 MG/DL — SIGNIFICANT CHANGE UP (ref 8.5–10.1)
CALCIUM SERPL-MCNC: 8.6 MG/DL — SIGNIFICANT CHANGE UP (ref 8.5–10.1)
CHLORIDE SERPL-SCNC: 107 MMOL/L — SIGNIFICANT CHANGE UP (ref 96–108)
CHLORIDE SERPL-SCNC: 107 MMOL/L — SIGNIFICANT CHANGE UP (ref 96–108)
CO2 SERPL-SCNC: 29 MMOL/L — SIGNIFICANT CHANGE UP (ref 22–31)
CO2 SERPL-SCNC: 29 MMOL/L — SIGNIFICANT CHANGE UP (ref 22–31)
CREAT SERPL-MCNC: 0.6 MG/DL — SIGNIFICANT CHANGE UP (ref 0.5–1.3)
CREAT SERPL-MCNC: 0.6 MG/DL — SIGNIFICANT CHANGE UP (ref 0.5–1.3)
EGFR: 124 ML/MIN/1.73M2 — SIGNIFICANT CHANGE UP
EGFR: 124 ML/MIN/1.73M2 — SIGNIFICANT CHANGE UP
EOSINOPHIL # BLD AUTO: 0.06 K/UL — SIGNIFICANT CHANGE UP (ref 0–0.5)
EOSINOPHIL # BLD AUTO: 0.06 K/UL — SIGNIFICANT CHANGE UP (ref 0–0.5)
EOSINOPHIL NFR BLD AUTO: 0.7 % — SIGNIFICANT CHANGE UP (ref 0–6)
EOSINOPHIL NFR BLD AUTO: 0.7 % — SIGNIFICANT CHANGE UP (ref 0–6)
GLUCOSE SERPL-MCNC: 115 MG/DL — HIGH (ref 70–99)
GLUCOSE SERPL-MCNC: 115 MG/DL — HIGH (ref 70–99)
HCT VFR BLD CALC: 29 % — LOW (ref 34.5–45)
HCT VFR BLD CALC: 29 % — LOW (ref 34.5–45)
HGB BLD-MCNC: 9.7 G/DL — LOW (ref 11.5–15.5)
HGB BLD-MCNC: 9.7 G/DL — LOW (ref 11.5–15.5)
IMM GRANULOCYTES NFR BLD AUTO: 0.4 % — SIGNIFICANT CHANGE UP (ref 0–0.9)
IMM GRANULOCYTES NFR BLD AUTO: 0.4 % — SIGNIFICANT CHANGE UP (ref 0–0.9)
LYMPHOCYTES # BLD AUTO: 1.61 K/UL — SIGNIFICANT CHANGE UP (ref 1–3.3)
LYMPHOCYTES # BLD AUTO: 1.61 K/UL — SIGNIFICANT CHANGE UP (ref 1–3.3)
LYMPHOCYTES # BLD AUTO: 17.6 % — SIGNIFICANT CHANGE UP (ref 13–44)
LYMPHOCYTES # BLD AUTO: 17.6 % — SIGNIFICANT CHANGE UP (ref 13–44)
MCHC RBC-ENTMCNC: 30.9 PG — SIGNIFICANT CHANGE UP (ref 27–34)
MCHC RBC-ENTMCNC: 30.9 PG — SIGNIFICANT CHANGE UP (ref 27–34)
MCHC RBC-ENTMCNC: 33.4 GM/DL — SIGNIFICANT CHANGE UP (ref 32–36)
MCHC RBC-ENTMCNC: 33.4 GM/DL — SIGNIFICANT CHANGE UP (ref 32–36)
MCV RBC AUTO: 92.4 FL — SIGNIFICANT CHANGE UP (ref 80–100)
MCV RBC AUTO: 92.4 FL — SIGNIFICANT CHANGE UP (ref 80–100)
MONOCYTES # BLD AUTO: 0.59 K/UL — SIGNIFICANT CHANGE UP (ref 0–0.9)
MONOCYTES # BLD AUTO: 0.59 K/UL — SIGNIFICANT CHANGE UP (ref 0–0.9)
MONOCYTES NFR BLD AUTO: 6.5 % — SIGNIFICANT CHANGE UP (ref 2–14)
MONOCYTES NFR BLD AUTO: 6.5 % — SIGNIFICANT CHANGE UP (ref 2–14)
NEUTROPHILS # BLD AUTO: 6.8 K/UL — SIGNIFICANT CHANGE UP (ref 1.8–7.4)
NEUTROPHILS # BLD AUTO: 6.8 K/UL — SIGNIFICANT CHANGE UP (ref 1.8–7.4)
NEUTROPHILS NFR BLD AUTO: 74.5 % — SIGNIFICANT CHANGE UP (ref 43–77)
NEUTROPHILS NFR BLD AUTO: 74.5 % — SIGNIFICANT CHANGE UP (ref 43–77)
PLATELET # BLD AUTO: 247 K/UL — SIGNIFICANT CHANGE UP (ref 150–400)
PLATELET # BLD AUTO: 247 K/UL — SIGNIFICANT CHANGE UP (ref 150–400)
POTASSIUM SERPL-MCNC: 4.2 MMOL/L — SIGNIFICANT CHANGE UP (ref 3.5–5.3)
POTASSIUM SERPL-MCNC: 4.2 MMOL/L — SIGNIFICANT CHANGE UP (ref 3.5–5.3)
POTASSIUM SERPL-SCNC: 4.2 MMOL/L — SIGNIFICANT CHANGE UP (ref 3.5–5.3)
POTASSIUM SERPL-SCNC: 4.2 MMOL/L — SIGNIFICANT CHANGE UP (ref 3.5–5.3)
PROT SERPL-MCNC: 5.9 GM/DL — LOW (ref 6–8.3)
PROT SERPL-MCNC: 5.9 GM/DL — LOW (ref 6–8.3)
RBC # BLD: 3.14 M/UL — LOW (ref 3.8–5.2)
RBC # BLD: 3.14 M/UL — LOW (ref 3.8–5.2)
RBC # FLD: 13.7 % — SIGNIFICANT CHANGE UP (ref 10.3–14.5)
RBC # FLD: 13.7 % — SIGNIFICANT CHANGE UP (ref 10.3–14.5)
SODIUM SERPL-SCNC: 139 MMOL/L — SIGNIFICANT CHANGE UP (ref 135–145)
SODIUM SERPL-SCNC: 139 MMOL/L — SIGNIFICANT CHANGE UP (ref 135–145)
WBC # BLD: 9.13 K/UL — SIGNIFICANT CHANGE UP (ref 3.8–10.5)
WBC # BLD: 9.13 K/UL — SIGNIFICANT CHANGE UP (ref 3.8–10.5)
WBC # FLD AUTO: 9.13 K/UL — SIGNIFICANT CHANGE UP (ref 3.8–10.5)
WBC # FLD AUTO: 9.13 K/UL — SIGNIFICANT CHANGE UP (ref 3.8–10.5)

## 2024-01-09 RX ORDER — IBUPROFEN 200 MG
600 TABLET ORAL EVERY 6 HOURS
Refills: 0 | Status: DISCONTINUED | OUTPATIENT
Start: 2024-01-09 | End: 2024-01-11

## 2024-01-09 RX ADMIN — Medication 975 MILLIGRAM(S): at 21:15

## 2024-01-09 RX ADMIN — SIMETHICONE 80 MILLIGRAM(S): 80 TABLET, CHEWABLE ORAL at 17:14

## 2024-01-09 RX ADMIN — Medication 30 MILLIGRAM(S): at 11:36

## 2024-01-09 RX ADMIN — Medication 600 MILLIGRAM(S): at 18:00

## 2024-01-09 RX ADMIN — Medication 600 MILLIGRAM(S): at 23:33

## 2024-01-09 RX ADMIN — Medication 975 MILLIGRAM(S): at 03:05

## 2024-01-09 RX ADMIN — Medication 30 MILLIGRAM(S): at 12:15

## 2024-01-09 RX ADMIN — Medication 30 MILLIGRAM(S): at 06:31

## 2024-01-09 RX ADMIN — MAGNESIUM HYDROXIDE 30 MILLILITER(S): 400 TABLET, CHEWABLE ORAL at 17:14

## 2024-01-09 RX ADMIN — Medication 975 MILLIGRAM(S): at 15:27

## 2024-01-09 RX ADMIN — Medication 30 MILLIGRAM(S): at 07:01

## 2024-01-09 RX ADMIN — Medication 975 MILLIGRAM(S): at 09:00

## 2024-01-09 RX ADMIN — Medication 975 MILLIGRAM(S): at 03:40

## 2024-01-09 RX ADMIN — Medication 30 MILLIGRAM(S): at 00:10

## 2024-01-09 RX ADMIN — Medication 975 MILLIGRAM(S): at 20:39

## 2024-01-09 RX ADMIN — Medication 600 MILLIGRAM(S): at 17:14

## 2024-01-09 RX ADMIN — Medication 975 MILLIGRAM(S): at 08:23

## 2024-01-09 RX ADMIN — Medication 975 MILLIGRAM(S): at 16:00

## 2024-01-09 NOTE — PROGRESS NOTE ADULT - SUBJECTIVE AND OBJECTIVE BOX
Postpartum Note,  Section  She is a  28y woman who is now post-operative day: 1     28y  Subjective:  The patient feels well.  She is ambulating.   She is tolerating regular diet.  She denies nausea and vomiting.  She is voiding.  Her pain is controlled.  She reports normal postpartum bleeding.  She is breastfeeding and formula feeding.    Physical exam:    Vital Signs Last 24 Hrs  T(C): 36.7 (2024 04:30), Max: 36.8 (2024 21:00)  T(F): 98 (2024 04:30), Max: 98.2 (2024 21:00)  HR: 76 (2024 04:30) (69 - 99)  BP: 111/74 (2024 04:30) (110/76 - 134/85)  BP(mean): --  RR: 16 (2024 04:30) (16 - 18)  SpO2: 99% (2024 04:30) (98% - 100%)    Parameters below as of 2024 04:30  Patient On (Oxygen Delivery Method): room air        Gen: NAD  Breast: Soft, nontender, not engorged.  Abdomen: Soft, nontender, no distension , firm uterine fundus at umbilicus.  Dressing: Clean, dry, and intact with steri strips  Pelvic: Normal lochia noted  Ext: No calf tenderness    LABS:                        11.4   11.33 )-----------( 279      ( 2024 06:28 )             33.0       Rubella status:       MEDICATIONS  (STANDING):  acetaminophen     Tablet .. 975 milliGRAM(s) Oral <User Schedule>  diphtheria/tetanus/pertussis (acellular) Vaccine (Adacel) 0.5 milliLiter(s) IntraMuscular once  enoxaparin Injectable 40 milliGRAM(s) SubCutaneous every 24 hours  ibuprofen  Tablet. 600 milliGRAM(s) Oral every 6 hours  ketorolac   Injectable 30 milliGRAM(s) IV Push every 6 hours  lactated ringers. 1000 milliLiter(s) (125 mL/Hr) IV Continuous <Continuous>  morphine PF Spinal 0.1 milliGRAM(s) IntraThecal. once  oxytocin Infusion 333.333 milliUNIT(s)/Min (1000 mL/Hr) IV Continuous <Continuous>  oxytocin Infusion 333.333 milliUNIT(s)/Min (1000 mL/Hr) IV Continuous <Continuous>    MEDICATIONS  (PRN):  diphenhydrAMINE 25 milliGRAM(s) Oral every 6 hours PRN Pruritus  lanolin Ointment 1 Application(s) Topical every 6 hours PRN Sore Nipples  magnesium hydroxide Suspension 30 milliLiter(s) Oral two times a day PRN Constipation  naloxone Injectable 0.1 milliGRAM(s) IV Push every 3 minutes PRN For ANY of the following changes in patient status:  A. RR LESS THAN 10 breaths per minute, B. Oxygen saturation LESS THAN 90%, C. Sedation score of 6  ondansetron Injectable 4 milliGRAM(s) IV Push every 6 hours PRN Nausea  oxyCODONE    IR 5 milliGRAM(s) Oral once PRN Moderate to Severe Pain (4-10)  oxyCODONE    IR 5 milliGRAM(s) Oral every 3 hours PRN Moderate to Severe Pain (4-10)  simethicone 80 milliGRAM(s) Chew every 4 hours PRN Gas        Assessment and Plan  POD # 1 s/p  Section  Doing well.  bonding well with baby  Encourage ambulation.  Encouraged PO fluids        Amara Bueno CNM

## 2024-01-09 NOTE — OB NEONATOLOGY/PEDIATRICIAN DELIVERY SUMMARY - NSPEDSNEONOTESA_OBGYN_ALL_OB_FT
This is a 39 1/7 week infant born via repeat  to a  mother. Prenatal hx PTSD, Asthma, seizure disorder (no medication), anxiety and depression cholecytectomy, Prenatal labs Blood type (O pos) HIV/HepB/VDRL neg Rubella Imm GBS (unknown)  Infant delivered active crying with good tone. brought to warmer and received routine measures. APGARS as noted.

## 2024-01-10 ENCOUNTER — TRANSCRIPTION ENCOUNTER (OUTPATIENT)
Age: 29
End: 2024-01-10

## 2024-01-10 RX ORDER — ACETAMINOPHEN 500 MG
3 TABLET ORAL
Qty: 84 | Refills: 0
Start: 2024-01-10 | End: 2024-01-16

## 2024-01-10 RX ORDER — OXYCODONE HYDROCHLORIDE 5 MG/1
5 TABLET ORAL ONCE
Refills: 0 | Status: DISCONTINUED | OUTPATIENT
Start: 2024-01-10 | End: 2024-01-10

## 2024-01-10 RX ORDER — IBUPROFEN 200 MG
1 TABLET ORAL
Qty: 28 | Refills: 0
Start: 2024-01-10 | End: 2024-01-16

## 2024-01-10 RX ORDER — OXYCODONE HYDROCHLORIDE 5 MG/1
5 TABLET ORAL
Refills: 0 | Status: DISCONTINUED | OUTPATIENT
Start: 2024-01-10 | End: 2024-01-11

## 2024-01-10 RX ADMIN — Medication 975 MILLIGRAM(S): at 03:05

## 2024-01-10 RX ADMIN — OXYCODONE HYDROCHLORIDE 5 MILLIGRAM(S): 5 TABLET ORAL at 18:53

## 2024-01-10 RX ADMIN — Medication 975 MILLIGRAM(S): at 09:28

## 2024-01-10 RX ADMIN — Medication 975 MILLIGRAM(S): at 10:14

## 2024-01-10 RX ADMIN — ENOXAPARIN SODIUM 40 MILLIGRAM(S): 100 INJECTION SUBCUTANEOUS at 21:00

## 2024-01-10 RX ADMIN — Medication 600 MILLIGRAM(S): at 13:30

## 2024-01-10 RX ADMIN — Medication 975 MILLIGRAM(S): at 19:30

## 2024-01-10 RX ADMIN — Medication 975 MILLIGRAM(S): at 18:54

## 2024-01-10 RX ADMIN — ENOXAPARIN SODIUM 40 MILLIGRAM(S): 100 INJECTION SUBCUTANEOUS at 00:07

## 2024-01-10 RX ADMIN — Medication 600 MILLIGRAM(S): at 06:40

## 2024-01-10 RX ADMIN — Medication 600 MILLIGRAM(S): at 06:09

## 2024-01-10 RX ADMIN — Medication 600 MILLIGRAM(S): at 00:30

## 2024-01-10 RX ADMIN — Medication 975 MILLIGRAM(S): at 04:10

## 2024-01-10 RX ADMIN — Medication 600 MILLIGRAM(S): at 14:14

## 2024-01-10 RX ADMIN — OXYCODONE HYDROCHLORIDE 5 MILLIGRAM(S): 5 TABLET ORAL at 19:39

## 2024-01-10 NOTE — DISCHARGE NOTE OB - CARE PROVIDER_API CALL
Judith Parsons  Obstetrics and Gynecology  [address], [address]  [place] [address]  Phone: [phone]  Fax: [phone]  Follow Up Time: 1 month   Judith Parsons  Obstetrics and Gynecology  [address]  [place] [address]  Phone: [phone]  Fax: [phone]  Follow Up Time: 1 month

## 2024-01-10 NOTE — DISCHARGE NOTE OB - CARE PROVIDERS DIRECT ADDRESSES
,[email or web address]

## 2024-01-10 NOTE — DISCHARGE NOTE OB - PLAN OF CARE
28y  s/p elective pCS @ 39w1d. Patient underwent a  delivery. Post-op course was uncomplicated. Pain is well controlled with PRN medication. She has no difficulty with ambulation, voiding, or PO intake. Lab values and vital signs are within normal limits prior to discharge.   1) Please take acetaminophen and ibuprofen as needed for pain as prescribed.  2) Nothing in the vagina for 6 weeks (including no sex, no tampons, and no douching).  3) Please call your doctor for a follow up postpartum appointment in 4-6 weeks.  4) Please continue taking vitamins postpartum. Take iron and colace for acute blood loss anemia.  5) Please call the office sooner if you have heavy vaginal bleeding, severe abdominal pain, or fever > 100.4F.  6) You may resume regular daily activity as tolerated

## 2024-01-10 NOTE — DISCHARGE NOTE OB - PATIENT PORTAL LINK FT
You can access the FollowMyHealth Patient Portal offered by [facility] by registering at the following website: [email or web address]. By joining [employer]’s FollowMyHealth portal, you will also be able to view your health information using other applications (apps) compatible with our system. You can access the FollowMyHealth Patient Portal offered by [facility] by registering at the following website: [email or web address]. By joining [employer]’s FollowMyHealth portal, you will also be able to view your health information using other applications (apps) compatible with our system.

## 2024-01-10 NOTE — DISCHARGE NOTE OB - CARE PLAN
Principal Discharge DX:	Delivery of pregnancy by  section  Assessment and plan of treatment:	28y  s/p elective pCS @ 39w1d. Patient underwent a  delivery. Post-op course was uncomplicated. Pain is well controlled with PRN medication. She has no difficulty with ambulation, voiding, or PO intake. Lab values and vital signs are within normal limits prior to discharge.   1) Please take acetaminophen and ibuprofen as needed for pain as prescribed.  2) Nothing in the vagina for 6 weeks (including no sex, no tampons, and no douching).  3) Please call your doctor for a follow up postpartum appointment in 4-6 weeks.  4) Please continue taking vitamins postpartum. Take iron and colace for acute blood loss anemia.  5) Please call the office sooner if you have heavy vaginal bleeding, severe abdominal pain, or fever > 100.4F.  6) You may resume regular daily activity as tolerated   1

## 2024-01-10 NOTE — DISCHARGE NOTE OB - NS MD DC FALL RISK RISK
For information on Fall & Injury Prevention, visit: [email or web address] OR  [email or web address] OR  https://www.cdc.gov/steadi/patient.html For information on Fall & Injury Prevention, visit: [email or web address] OR  [email or web address] OR  https://www.cdc.gov/steadi/patient.html

## 2024-01-10 NOTE — PROGRESS NOTE ADULT - SUBJECTIVE AND OBJECTIVE BOX
Postpartum Note,  Section  She is a  28y woman who is now post-operative day 2:     Subjective:  The patient feels well.  She is ambulating.   She is tolerating regular diet.  She denies nausea and vomiting.  She is voiding.  Her pain is controlled.  She reports normal postpartum bleeding.  She is breastfeeding.      Physical exam:    Vital Signs Last 24 Hrs  T(C): 37 (10 Ye 2024 08:18), Max: 37 (10 Ye 2024 08:18)  T(F): 98.6 (10 Ye 2024 08:18), Max: 98.6 (10 Ye 2024 08:18)  HR: 66 (10 Ye 2024 08:18) (65 - 86)  BP: 103/71 (10 Ye 2024 08:18) (103/71 - 136/90)  BP(mean): --  RR: 16 (10 Ye 2024 08:18) (16 - 17)  SpO2: 98% (10 Ye 2024 08:18) (98% - 100%)    Parameters below as of 10 Ye 2024 08:18  Patient On (Oxygen Delivery Method): room air        Gen: NAD  Breast: Soft, nontender, not engorged.  Abdomen: Soft, nontender, no distension , firm uterine fundus at umbilicus.  Incision: Clean, dry, and intact with steri strips  Pelvic: Normal lochia noted  Ext: No calf tenderness    LABS:                        9.7    9.13  )-----------( 247      ( 2024 10:39 )             29.0       Rubella status:     Allergies    No Known Drug Allergies  Mushrooms (Unknown)    Intolerances      MEDICATIONS  (STANDING):  acetaminophen     Tablet .. 975 milliGRAM(s) Oral <User Schedule>  diphtheria/tetanus/pertussis (acellular) Vaccine (Adacel) 0.5 milliLiter(s) IntraMuscular once  enoxaparin Injectable 40 milliGRAM(s) SubCutaneous every 24 hours  ibuprofen  Tablet. 600 milliGRAM(s) Oral every 6 hours  lactated ringers. 1000 milliLiter(s) (125 mL/Hr) IV Continuous <Continuous>  morphine PF Spinal 0.1 milliGRAM(s) IntraThecal. once  oxytocin Infusion 333.333 milliUNIT(s)/Min (1000 mL/Hr) IV Continuous <Continuous>  oxytocin Infusion 333.333 milliUNIT(s)/Min (1000 mL/Hr) IV Continuous <Continuous>    MEDICATIONS  (PRN):  diphenhydrAMINE 25 milliGRAM(s) Oral every 6 hours PRN Pruritus  lanolin Ointment 1 Application(s) Topical every 6 hours PRN Sore Nipples  magnesium hydroxide Suspension 30 milliLiter(s) Oral two times a day PRN Constipation  naloxone Injectable 0.1 milliGRAM(s) IV Push every 3 minutes PRN For ANY of the following changes in patient status:  A. RR LESS THAN 10 breaths per minute, B. Oxygen saturation LESS THAN 90%, C. Sedation score of 6  ondansetron Injectable 4 milliGRAM(s) IV Push every 6 hours PRN Nausea  oxyCODONE    IR 5 milliGRAM(s) Oral once PRN Moderate to Severe Pain (4-10)  oxyCODONE    IR 5 milliGRAM(s) Oral every 3 hours PRN Moderate to Severe Pain (4-10)  simethicone 80 milliGRAM(s) Chew every 4 hours PRN Gas        Assessment and Plan  POD #2 s/p  section  Doing well.  Encourage ambulation.

## 2024-01-10 NOTE — DISCHARGE NOTE OB - HOSPITAL COURSE
28y  s/p elective pCS @ 39w1d. Postpartum pain is well controlled with PRN medication. She has no difficulty with ambulation, voiding or PO intake. Lab values and vital signs are within normal limits prior to discharge.

## 2024-01-11 VITALS
RESPIRATION RATE: 18 BRPM | SYSTOLIC BLOOD PRESSURE: 109 MMHG | HEART RATE: 66 BPM | OXYGEN SATURATION: 99 % | TEMPERATURE: 98 F | DIASTOLIC BLOOD PRESSURE: 71 MMHG

## 2024-01-11 RX ADMIN — Medication 975 MILLIGRAM(S): at 04:00

## 2024-01-11 RX ADMIN — Medication 975 MILLIGRAM(S): at 03:13

## 2024-01-11 RX ADMIN — Medication 600 MILLIGRAM(S): at 07:00

## 2024-01-11 RX ADMIN — OXYCODONE HYDROCHLORIDE 5 MILLIGRAM(S): 5 TABLET ORAL at 02:39

## 2024-01-11 RX ADMIN — Medication 600 MILLIGRAM(S): at 00:00

## 2024-01-11 RX ADMIN — Medication 600 MILLIGRAM(S): at 00:30

## 2024-01-11 RX ADMIN — Medication 600 MILLIGRAM(S): at 06:23

## 2024-01-11 RX ADMIN — OXYCODONE HYDROCHLORIDE 5 MILLIGRAM(S): 5 TABLET ORAL at 00:29

## 2024-01-11 RX ADMIN — Medication 975 MILLIGRAM(S): at 09:44

## 2024-01-11 NOTE — PROGRESS NOTE ADULT - SUBJECTIVE AND OBJECTIVE BOX
Postpartum Note,  Section discharge  She is a  28y woman who is now post-operative day: 3    Subjective:  The patient feels well.  She is ambulating without difficulty.  She is tolerating PO.  She is voiding.  She denies nausea and vomiting.  Her pain is controlled.  She reports normal postpartum bleeding  She is breastfeeding and formula feeding.    Physical exam:    Vital Signs Last 24 Hrs  T(C): 36.6 (2024 08:31), Max: 36.8 (10 Ye 2024 16:28)  T(F): 97.9 (2024 08:31), Max: 98.2 (10 Ye 2024 16:28)  HR: 66 (2024 08:31) (66 - 76)  BP: 109/71 (2024 08:31) (100/63 - 116/81)  BP(mean): --  RR: 18 (2024 08:31) (16 - 18)  SpO2: 99% (2024 08:31) (98% - 99%)    Parameters below as of 2024 08:31  Patient On (Oxygen Delivery Method): room air        Gen: NAD  Breast: Soft, nontender, non engorged  Abdomen: Soft, nontender, no distension , firm uterine fundus at umbilicus.  Incision: Clean, dry, and intact     Pelvic: Normal lochia noted  Ext: No calf tenderness    LABS:                        9.7    9.13  )-----------( 247      ( 2024 10:39 )             29.0     blood type  Rubella status:     Allergies    No Known Drug Allergies  Mushrooms (Unknown)    Intolerances        MEDICATIONS  (STANDING):  acetaminophen     Tablet .. 975 milliGRAM(s) Oral <User Schedule>  diphtheria/tetanus/pertussis (acellular) Vaccine (Adacel) 0.5 milliLiter(s) IntraMuscular once  enoxaparin Injectable 40 milliGRAM(s) SubCutaneous every 24 hours  ibuprofen  Tablet. 600 milliGRAM(s) Oral every 6 hours  lactated ringers. 1000 milliLiter(s) (125 mL/Hr) IV Continuous <Continuous>  morphine PF Spinal 0.1 milliGRAM(s) IntraThecal. once  oxytocin Infusion 333.333 milliUNIT(s)/Min (1000 mL/Hr) IV Continuous <Continuous>  oxytocin Infusion 333.333 milliUNIT(s)/Min (1000 mL/Hr) IV Continuous <Continuous>    MEDICATIONS  (PRN):  diphenhydrAMINE 25 milliGRAM(s) Oral every 6 hours PRN Pruritus  lanolin Ointment 1 Application(s) Topical every 6 hours PRN Sore Nipples  magnesium hydroxide Suspension 30 milliLiter(s) Oral two times a day PRN Constipation  naloxone Injectable 0.1 milliGRAM(s) IV Push every 3 minutes PRN For ANY of the following changes in patient status:  A. RR LESS THAN 10 breaths per minute, B. Oxygen saturation LESS THAN 90%, C. Sedation score of 6  ondansetron Injectable 4 milliGRAM(s) IV Push every 6 hours PRN Nausea  oxyCODONE    IR 5 milliGRAM(s) Oral every 3 hours PRN Moderate to Severe Pain (4-10)  oxyCODONE    IR 5 milliGRAM(s) Oral once PRN Moderate to Severe Pain (4-10)  simethicone 80 milliGRAM(s) Chew every 4 hours PRN Gas        Assessment and Plan  POD # 3 s/p  section  Doing well.  Encourage ambulation.  Discharge home today.  F/U in1 weeks for incision check.  verbal discharge instructions d/w patient  - discharge summary given  Call for fevers, chills, nausea, vomiting, heavy vaginal bleeding, vaginal discharge, severe pain, symptoms of depression, problems with incision or any other concerning symptoms.  Nothing in vagina and no heavy lifting x 6 weeks.  No driving x 1 week         Krystyna Bueno CNM

## 2024-01-12 ENCOUNTER — APPOINTMENT (OUTPATIENT)
Age: 29
End: 2024-01-12
Payer: COMMERCIAL

## 2024-01-12 PROCEDURE — S9443: CPT | Mod: 95

## 2024-01-15 DIAGNOSIS — F41.9 ANXIETY DISORDER, UNSPECIFIED: ICD-10-CM

## 2024-01-15 DIAGNOSIS — K58.9 IRRITABLE BOWEL SYNDROME WITHOUT DIARRHEA: ICD-10-CM

## 2024-01-15 DIAGNOSIS — Z90.49 ACQUIRED ABSENCE OF OTHER SPECIFIED PARTS OF DIGESTIVE TRACT: ICD-10-CM

## 2024-01-15 DIAGNOSIS — F43.10 POST-TRAUMATIC STRESS DISORDER, UNSPECIFIED: ICD-10-CM

## 2024-01-15 DIAGNOSIS — Z3A.39 39 WEEKS GESTATION OF PREGNANCY: ICD-10-CM

## 2024-01-15 DIAGNOSIS — F32.A DEPRESSION, UNSPECIFIED: ICD-10-CM

## 2024-01-15 DIAGNOSIS — Z28.09 IMMUNIZATION NOT CARRIED OUT BECAUSE OF OTHER CONTRAINDICATION: ICD-10-CM

## 2024-01-15 DIAGNOSIS — Z87.442 PERSONAL HISTORY OF URINARY CALCULI: ICD-10-CM

## 2024-02-13 NOTE — ED PROVIDER NOTE - CARDIAC, MLM
History  Chief Complaint   Patient presents with    Flu Symptoms     Pt states Saturday she started with congestion, body aches, and fevers. Decreased PO intake      29 yo otherwise healthy female presents with 4-day history of intermittent fevers congestion body aches and nausea. She feels intermtianttly short of breath no wheezing. No chest pain throat feels sore and her ears are bothering her. Has been able to tolerate fliuds no rash. No cheat pain no abdm pain no leg swelling no prior hx of DVT or PE. Urinating normally no dysuria or increased frequency no sick contacts or travel. Is fatiqued          None       History reviewed. No pertinent past medical history.    History reviewed. No pertinent surgical history.    History reviewed. No pertinent family history.  I have reviewed and agree with the history as documented.    E-Cigarette/Vaping    E-Cigarette Use Never User      E-Cigarette/Vaping Substances    Nicotine Yes      Social History     Tobacco Use    Smoking status: Former     Current packs/day: 0.50     Types: Cigarettes    Smokeless tobacco: Never   Vaping Use    Vaping status: Never Used   Substance Use Topics    Alcohol use: Yes     Comment: occasional    Drug use: Never       Review of Systems   Constitutional:  Positive for activity change, appetite change, fatigue and fever.   HENT:  Positive for congestion, ear pain and sore throat. Negative for facial swelling, sinus pressure, sinus pain, trouble swallowing and voice change.    Respiratory:  Positive for cough and shortness of breath. Negative for wheezing.    Cardiovascular:  Negative for chest pain and leg swelling.   Gastrointestinal:  Positive for nausea. Negative for abdominal distention, diarrhea and vomiting.   Genitourinary:  Negative for difficulty urinating and dysuria.   Musculoskeletal:  Positive for myalgias. Negative for back pain.   Skin:  Negative for rash.   Neurological:  Negative for speech difficulty, weakness,  light-headedness and headaches.   Psychiatric/Behavioral:  Negative for confusion.    All other systems reviewed and are negative.      Physical Exam  Physical Exam  Vitals and nursing note reviewed.   Constitutional:       General: She is not in acute distress.     Appearance: She is well-developed. She is not diaphoretic.   HENT:      Head: Normocephalic and atraumatic.      Right Ear: Tympanic membrane and external ear normal.      Left Ear: Tympanic membrane and external ear normal.      Nose: Nose normal. No congestion or rhinorrhea.      Mouth/Throat:      Mouth: Mucous membranes are moist.      Pharynx: No oropharyngeal exudate or posterior oropharyngeal erythema.   Eyes:      General:         Right eye: No discharge.         Left eye: No discharge.      Extraocular Movements: Extraocular movements intact.      Conjunctiva/sclera: Conjunctivae normal.      Pupils: Pupils are equal, round, and reactive to light.   Neck:      Comments: No midline or paraspinous tenderness  Cardiovascular:      Rate and Rhythm: Normal rate and regular rhythm.      Heart sounds: Normal heart sounds.   Pulmonary:      Effort: Pulmonary effort is normal. No respiratory distress.      Breath sounds: Normal breath sounds. No wheezing, rhonchi or rales.      Comments: Sats 98% on RA  Chest:      Chest wall: No tenderness.   Abdominal:      General: Bowel sounds are normal. There is no distension.      Palpations: Abdomen is soft.      Tenderness: There is no abdominal tenderness. There is no guarding or rebound.      Comments: Back no midline or CVA tenderness   Musculoskeletal:         General: No tenderness or deformity. Normal range of motion.      Cervical back: Normal range of motion and neck supple. No rigidity or tenderness.   Lymphadenopathy:      Cervical: No cervical adenopathy.   Skin:     General: Skin is warm and dry.   Neurological:      Mental Status: She is alert and oriented to person, place, and time.      Cranial  Nerves: No cranial nerve deficit.      Sensory: No sensory deficit.      Motor: No weakness or abnormal muscle tone.      Coordination: Coordination normal.         Vital Signs  ED Triage Vitals [02/13/24 1650]   Temperature Pulse Respirations Blood Pressure SpO2   98.2 °F (36.8 °C) 89 15 118/73 98 %      Temp Source Heart Rate Source Patient Position - Orthostatic VS BP Location FiO2 (%)   Tympanic Monitor Sitting Right arm --      Pain Score       8           Vitals:    02/13/24 1650   BP: 118/73   Pulse: 89   Patient Position - Orthostatic VS: Sitting         Visual Acuity      ED Medications  Medications   ondansetron (ZOFRAN-ODT) dispersible tablet 4 mg (4 mg Oral Given 2/13/24 1746)   albuterol (PROVENTIL HFA,VENTOLIN HFA) inhaler 2 puff (2 puffs Inhalation Given 2/13/24 1746)       Diagnostic Studies  Results Reviewed       Procedure Component Value Units Date/Time    FLU/RSV/COVID - if FLU/RSV clinically relevant [[id number]]  (Abnormal) Collected: 02/13/24 1746    Lab Status: Final result Specimen: Nares from Nose Updated: 02/13/24 1837     SARS-CoV-2 Negative     INFLUENZA A PCR Negative     INFLUENZA B PCR Positive     RSV PCR Negative    Narrative:      FOR PEDIATRIC PATIENTS - copy/paste COVID Guidelines URL to browser: [email or web address]    SARS-CoV-2 assay is a Nucleic Acid Amplification assay intended for the  qualitative detection of nucleic acid from SARS-CoV-2 in nasopharyngeal  swabs. Results are for the presumptive identification of SARS-CoV-2 RNA.    Positive results are indicative of infection with SARS-CoV-2, the virus  causing COVID-19, but do not rule out bacterial infection or co-infection  with other viruses. Laboratories within the [place] and its  territories are required to report all positive results to the appropriate  public health authorities. Negative results do not preclude SARS-CoV-2  infection and should not be used  as the sole basis for treatment or other  patient management decisions. Negative results must be combined with  clinical observations, patient history, and epidemiological information.  This test has not been FDA cleared or approved.    This test has been authorized by FDA under an Emergency Use Authorization  (EUA). This test is only authorized for the duration of time the  declaration that circumstances exist justifying the authorization of the  emergency use of an in vitro diagnostic tests for detection of SARS-CoV-2  virus and/or diagnosis of COVID-19 infection under section 564(b)(1) of  the Act, 21 U.S.C. 360bbb-3(b)(1), unless the authorization is terminated  or revoked sooner. The test has been validated but independent review by FDA  and CLIA is pending.    Test performed using [employer]: This RT-PCR assay targets N2,  a region unique to SARS-CoV-2. A conserved region in the E-gene was chosen  for pan-Sarbecovirus detection which includes SARS-CoV-2.    According to CMS-2020-01-R, this platform meets the definition of high-throughput technology.    Strep A PCR [[id number]]  (Normal) Collected: 02/13/24 1746    Lab Status: Final result Specimen: Throat Updated: 02/13/24 1825     STREP A PCR Not Detected                   No orders to display              Procedures  Procedures         ED Course                               SBIRT 20yo+      Flowsheet Row Most Recent Value   Initial Alcohol Screen: US AUDIT-C     1. How often do you have a drink containing alcohol? 0 Filed at: 02/13/2024 1654   2. How many drinks containing alcohol do you have on a typical day you are drinking?  0 Filed at: 02/13/2024 1654   3a. Male UNDER 65: How often do you have five or more drinks on one occasion? 0 Filed at: 02/13/2024 1654   3b. FEMALE Any Age, or MALE 65+: How often do you have 4 or more drinks on one occassion? 0 Filed at: 02/13/2024 1654   Audit-C Score 0 Filed at: 02/13/2024 1654   IVETTE: How many times in the  past year have you...    Used an illegal drug or used a prescription medication for non-medical reasons? Never Filed at: 02/13/2024 1654                      Medical Decision Making  Mdm: Patient not currently demonstrating any signs of SIRS.  Oxygenation is normal no increased work of breathing.  Discussed obtaining chest x-ray but unlikely to have a lower respiratory tract infection.  Symptoms sound viral in nature recommending proceeding with COVID influenza RSV swab.  We discussed prescribing Tamiflu but due to lack of efficacy shared decision was utilized and patient defers on a prescription for this if her influenza is positive.  Recommended symptomatic management with antiemetics fluids will check rapid strep if positive we will call in antibiotic.  Will also try inhaler for symptomatic relief.  Discussed isolation precautions if her viral swab is positive    Left voicemail with positive influeza on swab    Amount and/or Complexity of Data Reviewed  Labs: ordered.    Risk  Prescription drug management.             Disposition  Final diagnoses:   Flu-like symptoms     Time reflects when diagnosis was documented in both MDM as applicable and the Disposition within this note       Time User Action Codes Description Comment    2/13/2024  5:33 PM Loren Mcintyre [R68.89] Flu-like symptoms           ED Disposition       ED Disposition   Discharge    Condition   Stable    Date/Time   Tue Feb 13, 2024 1733    Comment   Esther Casey discharge to home/self care.                   Follow-up Information    None         Discharge Medication List as of 2/13/2024  5:44 PM        START taking these medications    Details   ondansetron (ZOFRAN-ODT) 4 mg disintegrating tablet Take 1 tablet (4 mg total) by mouth every 8 (eight) hours as needed for nausea or vomiting for up to 20 doses, Starting Tue 2/13/2024, Normal             No discharge procedures on file.    PDMP Review       None            ED  Provider  Electronically Signed by             Loren Mcintyre MD  02/13/24 1916     Normal rate, regular rhythm.  Heart sounds S1, S2.  No murmurs, rubs or gallops.

## 2024-08-11 NOTE — ED ADULT NURSE NOTE - PRIMARY CARE PROVIDER
Patient: Lis Mendez    Procedure Summary       Date: 08/11/24 Room / Location: [facility] 08 / SURGERY [facility]    Anesthesia Start: 1103 Anesthesia Stop: 1210    Procedure: CHOLECYSTECTOMY, LAPAROSCOPIC (Abdomen) Diagnosis: (acute cholecystitis)    Surgeons: Josemanuel Campbell M.D. Responsible Provider: Can Abdi M.D.    Anesthesia Type: general ASA Status: 2            Final Anesthesia Type: general  Last vitals  BP   Blood Pressure: 137/82    Temp   36.7 °C (98.1 °F)    Pulse   (!) 105   Resp   18    SpO2   94 %      Anesthesia Post Evaluation    Patient location during evaluation: PACU  Patient participation: complete - patient participated  Level of consciousness: awake and alert  Pain score: 3    Airway patency: patent  Anesthetic complications: no  Cardiovascular status: hemodynamically stable  Respiratory status: acceptable  Hydration status: euvolemic    PONV: none          No notable events documented.     Nurse Pain Score: 3 (NPRS)          
unk

## 2024-09-23 NOTE — ED PROVIDER NOTE - CARE PLAN
Principal Discharge DX:	Abdominal pain  Secondary Diagnosis:	IBS (irritable bowel syndrome)
posterior

## 2024-12-14 NOTE — OB RN PATIENT PROFILE - PRO MENTAL HEALTH SX RECENT
Woke up to her cat and a small ceiling panel landing on her head after cat had climbed up into the ceiling. 1cm laceration noted to scalp on right side of head. Bleeding has stopped.        
none

## 2025-01-14 NOTE — H&P ADULT - NSHPPOAURINARYCATHETER_GEN_ALL_CORE
Use a body lotion for sensitive skin to moisturize your skin after bathing or showering, as soap can also dry out your skin.  Use the lotion everyday especially when the weather is cold to keep it from coming back.    S? d?ng claire d??ng da cho da nh?y c?m ?? d??ng ?m cho da mary khi t?m ho?c t?m vòi sen, vì xà phòng c?ng có th? làm polinaô da. S? d?ng claire d??ng da hàng ngày, ??c bi?t là khi th?i ti?t l?nh ?? ng?n ng?a tianna tr?dora trujillot.   no

## 2025-05-15 NOTE — OB RN PATIENT PROFILE - NS_ADMITDT_OBGYN_ALL_OB_DT
"     May 16, 2025    Ernie Walton  [address] [facility]  [place] [address]             [address]  [address] [facility] [place] [address]  Phone: [phone]  Fax: [phone] Dear {MR/MRS/MS/DR:85943} Ernie Walton:    Below are the results from your recent visit:    Resulted Orders   X-Ray Chest PA And Lateral    Narrative    EXAMINATION:  XR CHEST PA AND LATERAL    CLINICAL HISTORY:  Acute cough    TECHNIQUE:  PA and lateral views of the chest were performed.    COMPARISON:  None    FINDINGS:  There are interstitial opacity seen scattered throughout both lungs with relative sparing of the bilateral lung apices.  The interstitial opacities are similar in appearance to the recent CT of the chest from 05/09/2025.  There appears to be mild blunting of the posterior costophrenic angle on the left suggesting a small effusion..  The heart is not enlarged.  A right-sided MediPort catheter is present.      Impression    As above      Electronically signed by: Juan C Jackson DO  Date:    05/15/2025  Time:    12:41     Your results are {normal/acceptable:16893::"normal"}.  Please {Therapies; lab letter directions:[id number]::"don't hesitate to call our office if you have any questions or concerns"}.      Sincerely,        Danica Cook, NP     "
08-Jan-2024 05:48